# Patient Record
Sex: FEMALE | Race: WHITE | NOT HISPANIC OR LATINO | Employment: UNEMPLOYED | URBAN - METROPOLITAN AREA
[De-identification: names, ages, dates, MRNs, and addresses within clinical notes are randomized per-mention and may not be internally consistent; named-entity substitution may affect disease eponyms.]

---

## 2017-01-06 ENCOUNTER — LAB CONVERSION - ENCOUNTER (OUTPATIENT)
Dept: OTHER | Facility: OTHER | Age: 37
End: 2017-01-06

## 2017-01-06 LAB
BACTERIA UR QL AUTO: ABNORMAL /HPF
BILIRUB UR QL STRIP: NEGATIVE
COLOR UR: YELLOW
COMMENT (HISTORICAL): CLEAR
CULTURE RESULT (HISTORICAL): NORMAL
CYTOMEGALOVIRUS IGG,AB (HISTORICAL): 1.95
CYTOMEGALOVIRUS IGM,AB (HISTORICAL): <0.2
FECAL OCCULT BLOOD DIAGNOSTIC (HISTORICAL): NEGATIVE
GLUCOSE (HISTORICAL): NEGATIVE
HEPATITIS C ANTIBODY (HISTORICAL): NORMAL
HYALINE CASTS #/AREA URNS LPF: ABNORMAL /LPF
KETONES UR STRIP-MCNC: NEGATIVE MG/DL
LEUKOCYTE ESTERASE UR QL STRIP: ABNORMAL
NITRITE UR QL STRIP: NEGATIVE
PH UR STRIP.AUTO: 5.5 [PH] (ref 5–8)
PROT UR STRIP-MCNC: NEGATIVE MG/DL
RBC (HISTORICAL): ABNORMAL /HPF
RPR SCREEN (HISTORICAL): NORMAL
RUBELLA, IGG (HISTORICAL): 3.4
SIGNAL TO CUT-OFF (HISTORICAL): 0.01
SP GR UR STRIP.AUTO: 1.03 (ref 1–1.03)
SQUAMOUS EPITHELIAL CELLS (HISTORICAL): ABNORMAL /HPF
WBC # BLD AUTO: ABNORMAL /HPF

## 2017-01-09 ENCOUNTER — ALLSCRIPTS OFFICE VISIT (OUTPATIENT)
Dept: OTHER | Facility: OTHER | Age: 37
End: 2017-01-09

## 2017-01-11 LAB — CULTURE RESULT (HISTORICAL): NO GROWTH

## 2017-01-13 ENCOUNTER — LAB CONVERSION - ENCOUNTER (OUTPATIENT)
Dept: OTHER | Facility: OTHER | Age: 37
End: 2017-01-13

## 2017-01-13 ENCOUNTER — GENERIC CONVERSION - ENCOUNTER (OUTPATIENT)
Dept: OTHER | Facility: OTHER | Age: 37
End: 2017-01-13

## 2017-01-13 ENCOUNTER — ALLSCRIPTS OFFICE VISIT (OUTPATIENT)
Dept: PERINATAL CARE | Facility: CLINIC | Age: 37
End: 2017-01-13
Payer: COMMERCIAL

## 2017-01-13 LAB
BACTERIA UR QL AUTO: ABNORMAL /HPF
BILIRUB UR QL STRIP: NEGATIVE
CF COMMENT (HISTORICAL): NORMAL
COLOR UR: YELLOW
COMMENT (HISTORICAL): CLEAR
CULTURE RESULT (HISTORICAL): NORMAL
CYTOMEGALOVIRUS IGG,AB (HISTORICAL): 1.95
CYTOMEGALOVIRUS IGM,AB (HISTORICAL): <0.2
FECAL OCCULT BLOOD DIAGNOSTIC (HISTORICAL): NEGATIVE
GLUCOSE (HISTORICAL): NEGATIVE
HEPATITIS C ANTIBODY (HISTORICAL): NORMAL
HYALINE CASTS #/AREA URNS LPF: ABNORMAL /LPF
INTERPRETATION (HISTORICAL): NORMAL
KETONES UR STRIP-MCNC: NEGATIVE MG/DL
LEUKOCYTE ESTERASE UR QL STRIP: ABNORMAL
METHOD (HISTORICAL): NORMAL
MUTATIONS/POLYMORPHISMS (HISTORICAL): NORMAL
NITRITE UR QL STRIP: NEGATIVE
PH UR STRIP.AUTO: 5.5 [PH] (ref 5–8)
PROT UR STRIP-MCNC: NEGATIVE MG/DL
RACE/ETHNIC ORIGIN (HISTORICAL): NORMAL
RBC (HISTORICAL): ABNORMAL /HPF
REVIEWED BY (HISTORICAL): NORMAL
RPR SCREEN (HISTORICAL): NORMAL
RUBELLA, IGG (HISTORICAL): 3.4
SIGNAL TO CUT-OFF (HISTORICAL): 0.01
SP GR UR STRIP.AUTO: 1.03 (ref 1–1.03)
SQUAMOUS EPITHELIAL CELLS (HISTORICAL): ABNORMAL /HPF
WBC # BLD AUTO: ABNORMAL /HPF

## 2017-01-13 PROCEDURE — 76802 OB US < 14 WKS ADDL FETUS: CPT | Performed by: OBSTETRICS & GYNECOLOGY

## 2017-01-13 PROCEDURE — 76814 OB US NUCHAL MEAS ADD-ON: CPT | Performed by: OBSTETRICS & GYNECOLOGY

## 2017-01-13 PROCEDURE — 76813 OB US NUCHAL MEAS 1 GEST: CPT | Performed by: OBSTETRICS & GYNECOLOGY

## 2017-01-13 PROCEDURE — 76801 OB US < 14 WKS SINGLE FETUS: CPT | Performed by: OBSTETRICS & GYNECOLOGY

## 2017-01-16 ENCOUNTER — LAB CONVERSION - ENCOUNTER (OUTPATIENT)
Dept: OTHER | Facility: OTHER | Age: 37
End: 2017-01-16

## 2017-01-16 LAB
BACTERIA UR QL AUTO: ABNORMAL
BILIRUB UR QL STRIP: NEGATIVE
CHARACTER, URINE (HISTORICAL): ABNORMAL
COLOR UR: YELLOW
CRYSTAL TYPE (HISTORICAL): ABNORMAL PERHPF
CRYSTALS URNS QL MICRO: ABNORMAL
FINE GRAN CASTS URNS QL MICRO: ABNORMAL PERLPF (ref 0–1)
GLUCOSE UR STRIP-MCNC: NEGATIVE MG/DL
HGB UR QL STRIP.AUTO: NEGATIVE
HYALINE CASTS #/AREA URNS LPF: ABNORMAL PERLPF (ref 0–4)
KETONES UR STRIP-MCNC: NEGATIVE MG/DL
LEUKOCYTE ESTERASE UR QL STRIP: ABNORMAL
NITRITE UR QL STRIP: NEGATIVE
NON-SQ EPI CELLS URNS QL MICRO: ABNORMAL
PH UR STRIP.AUTO: 6.5 [PH] (ref 5–8)
PROT UR-MCNC: NEGATIVE G/DL
RBC CASTS URNS QL MICRO: ABNORMAL PERLPF (ref 0–1)
SP GR UR STRIP.AUTO: 1.02 (ref 1–1.03)
URINE RBC (HISTORICAL): ABNORMAL PERHPF
UROBILINOGEN UR QL STRIP.AUTO: 0.2 MG/DL (ref 0.2–1)
WBC #/AREA URNS AUTO: ABNORMAL PERHPF (ref 0–4)

## 2017-02-07 ENCOUNTER — GENERIC CONVERSION - ENCOUNTER (OUTPATIENT)
Dept: OTHER | Facility: OTHER | Age: 37
End: 2017-02-07

## 2017-02-13 ENCOUNTER — TRANSCRIBE ORDERS (OUTPATIENT)
Dept: ADMINISTRATIVE | Age: 37
End: 2017-02-13

## 2017-02-13 ENCOUNTER — APPOINTMENT (OUTPATIENT)
Dept: LAB | Age: 37
End: 2017-02-13
Payer: COMMERCIAL

## 2017-02-13 DIAGNOSIS — Z3A.17 17 WEEKS GESTATION OF PREGNANCY: ICD-10-CM

## 2017-02-13 DIAGNOSIS — Z3A.17 17 WEEKS GESTATION OF PREGNANCY: Primary | ICD-10-CM

## 2017-02-13 PROCEDURE — 36415 COLL VENOUS BLD VENIPUNCTURE: CPT

## 2017-02-14 LAB — SCAN RESULT: NORMAL

## 2017-02-15 ENCOUNTER — GENERIC CONVERSION - ENCOUNTER (OUTPATIENT)
Dept: OTHER | Facility: OTHER | Age: 37
End: 2017-02-15

## 2017-03-06 ENCOUNTER — GENERIC CONVERSION - ENCOUNTER (OUTPATIENT)
Dept: OTHER | Facility: OTHER | Age: 37
End: 2017-03-06

## 2017-03-10 ENCOUNTER — ALLSCRIPTS OFFICE VISIT (OUTPATIENT)
Dept: PERINATAL CARE | Facility: CLINIC | Age: 37
End: 2017-03-10
Payer: COMMERCIAL

## 2017-03-10 ENCOUNTER — GENERIC CONVERSION - ENCOUNTER (OUTPATIENT)
Dept: OTHER | Facility: OTHER | Age: 37
End: 2017-03-10

## 2017-03-10 PROCEDURE — 76812 OB US DETAILED ADDL FETUS: CPT | Performed by: OBSTETRICS & GYNECOLOGY

## 2017-03-10 PROCEDURE — 76817 TRANSVAGINAL US OBSTETRIC: CPT | Performed by: OBSTETRICS & GYNECOLOGY

## 2017-03-10 PROCEDURE — 76811 OB US DETAILED SNGL FETUS: CPT | Performed by: OBSTETRICS & GYNECOLOGY

## 2017-04-05 ENCOUNTER — ALLSCRIPTS OFFICE VISIT (OUTPATIENT)
Dept: OTHER | Facility: OTHER | Age: 37
End: 2017-04-05

## 2017-04-05 ENCOUNTER — GENERIC CONVERSION - ENCOUNTER (OUTPATIENT)
Dept: OTHER | Facility: OTHER | Age: 37
End: 2017-04-05

## 2017-04-05 DIAGNOSIS — O30.042 DICHORIONIC DIAMNIOTIC TWIN PREGNANCY IN SECOND TRIMESTER: ICD-10-CM

## 2017-04-07 ENCOUNTER — ALLSCRIPTS OFFICE VISIT (OUTPATIENT)
Dept: PERINATAL CARE | Facility: CLINIC | Age: 37
End: 2017-04-07
Payer: COMMERCIAL

## 2017-04-07 ENCOUNTER — GENERIC CONVERSION - ENCOUNTER (OUTPATIENT)
Dept: OTHER | Facility: OTHER | Age: 37
End: 2017-04-07

## 2017-04-07 PROCEDURE — 76816 OB US FOLLOW-UP PER FETUS: CPT | Performed by: OBSTETRICS & GYNECOLOGY

## 2017-04-22 ENCOUNTER — LAB CONVERSION - ENCOUNTER (OUTPATIENT)
Dept: OTHER | Facility: OTHER | Age: 37
End: 2017-04-22

## 2017-04-22 LAB
BASOPHILS # BLD AUTO: 0.3 %
BASOPHILS # BLD AUTO: 27 CELLS/UL (ref 0–200)
DEPRECATED RDW RBC AUTO: 13.4 % (ref 11–15)
EOSINOPHIL # BLD AUTO: 0.4 %
EOSINOPHIL # BLD AUTO: 36 CELLS/UL (ref 15–500)
GLUCOSE 1 HR 50 GM GLUC CHALLENGE-PREG PTS (HISTORICAL): 125 MG/DL
HCT VFR BLD AUTO: 32.3 % (ref 35–45)
HGB BLD-MCNC: 10.8 G/DL (ref 11.7–15.5)
LYMPHOCYTES # BLD AUTO: 1629 CELLS/UL (ref 850–3900)
LYMPHOCYTES # BLD AUTO: 18.3 %
MCH RBC QN AUTO: 32.5 PG (ref 27–33)
MCHC RBC AUTO-ENTMCNC: 33.4 G/DL (ref 32–36)
MCV RBC AUTO: 97.4 FL (ref 80–100)
MONOCYTES # BLD AUTO: 427 CELLS/UL (ref 200–950)
MONOCYTES (HISTORICAL): 4.8 %
NEUTROPHILS # BLD AUTO: 6782 CELLS/UL (ref 1500–7800)
NEUTROPHILS # BLD AUTO: 76.2 %
PLATELET # BLD AUTO: 185 THOUSAND/UL (ref 140–400)
PMV BLD AUTO: 8.1 FL (ref 7.5–12.5)
RBC # BLD AUTO: 3.32 MILLION/UL (ref 3.8–5.1)
WBC # BLD AUTO: 8.9 THOUSAND/UL (ref 3.8–10.8)

## 2017-05-05 ENCOUNTER — GENERIC CONVERSION - ENCOUNTER (OUTPATIENT)
Dept: OTHER | Facility: OTHER | Age: 37
End: 2017-05-05

## 2017-05-05 ENCOUNTER — ALLSCRIPTS OFFICE VISIT (OUTPATIENT)
Dept: PERINATAL CARE | Facility: CLINIC | Age: 37
End: 2017-05-05
Payer: COMMERCIAL

## 2017-05-05 PROCEDURE — 76816 OB US FOLLOW-UP PER FETUS: CPT | Performed by: OBSTETRICS & GYNECOLOGY

## 2017-05-09 ENCOUNTER — ALLSCRIPTS OFFICE VISIT (OUTPATIENT)
Dept: OTHER | Facility: OTHER | Age: 37
End: 2017-05-09

## 2017-05-09 DIAGNOSIS — R10.2 PELVIC AND PERINEAL PAIN: ICD-10-CM

## 2017-05-18 ENCOUNTER — APPOINTMENT (OUTPATIENT)
Dept: PHYSICAL THERAPY | Facility: REHABILITATION | Age: 37
End: 2017-05-18
Payer: COMMERCIAL

## 2017-05-18 DIAGNOSIS — R10.2 PELVIC AND PERINEAL PAIN: ICD-10-CM

## 2017-05-18 PROCEDURE — 97110 THERAPEUTIC EXERCISES: CPT

## 2017-05-18 PROCEDURE — 97162 PT EVAL MOD COMPLEX 30 MIN: CPT

## 2017-05-24 ENCOUNTER — GENERIC CONVERSION - ENCOUNTER (OUTPATIENT)
Dept: OTHER | Facility: OTHER | Age: 37
End: 2017-05-24

## 2017-05-26 ENCOUNTER — GENERIC CONVERSION - ENCOUNTER (OUTPATIENT)
Dept: OTHER | Facility: OTHER | Age: 37
End: 2017-05-26

## 2017-05-31 ENCOUNTER — APPOINTMENT (OUTPATIENT)
Dept: PHYSICAL THERAPY | Facility: REHABILITATION | Age: 37
End: 2017-05-31
Payer: COMMERCIAL

## 2017-05-31 ENCOUNTER — ALLSCRIPTS OFFICE VISIT (OUTPATIENT)
Dept: PERINATAL CARE | Facility: CLINIC | Age: 37
End: 2017-05-31
Payer: COMMERCIAL

## 2017-05-31 ENCOUNTER — GENERIC CONVERSION - ENCOUNTER (OUTPATIENT)
Dept: OTHER | Facility: OTHER | Age: 37
End: 2017-05-31

## 2017-05-31 PROCEDURE — 59897 UNLISTED FETAL INVAS PX W/US: CPT | Performed by: OBSTETRICS & GYNECOLOGY

## 2017-05-31 PROCEDURE — 76816 OB US FOLLOW-UP PER FETUS: CPT | Performed by: OBSTETRICS & GYNECOLOGY

## 2017-06-01 ENCOUNTER — GENERIC CONVERSION - ENCOUNTER (OUTPATIENT)
Dept: OTHER | Facility: OTHER | Age: 37
End: 2017-06-01

## 2017-06-06 ENCOUNTER — APPOINTMENT (OUTPATIENT)
Dept: PHYSICAL THERAPY | Facility: REHABILITATION | Age: 37
End: 2017-06-06
Payer: COMMERCIAL

## 2017-06-06 PROCEDURE — 97140 MANUAL THERAPY 1/> REGIONS: CPT

## 2017-06-06 PROCEDURE — 97110 THERAPEUTIC EXERCISES: CPT

## 2017-06-07 ENCOUNTER — GENERIC CONVERSION - ENCOUNTER (OUTPATIENT)
Dept: OBGYN CLINIC | Facility: CLINIC | Age: 37
End: 2017-06-07

## 2017-06-08 ENCOUNTER — ALLSCRIPTS OFFICE VISIT (OUTPATIENT)
Dept: PERINATAL CARE | Facility: CLINIC | Age: 37
End: 2017-06-08

## 2017-06-08 ENCOUNTER — GENERIC CONVERSION - ENCOUNTER (OUTPATIENT)
Dept: OTHER | Facility: OTHER | Age: 37
End: 2017-06-08

## 2017-06-08 ENCOUNTER — ALLSCRIPTS OFFICE VISIT (OUTPATIENT)
Dept: PERINATAL CARE | Facility: CLINIC | Age: 37
End: 2017-06-08
Payer: COMMERCIAL

## 2017-06-08 PROCEDURE — 59025 FETAL NON-STRESS TEST: CPT | Performed by: OBSTETRICS & GYNECOLOGY

## 2017-06-08 PROCEDURE — 76815 OB US LIMITED FETUS(S): CPT | Performed by: OBSTETRICS & GYNECOLOGY

## 2017-06-10 ENCOUNTER — LAB CONVERSION - ENCOUNTER (OUTPATIENT)
Dept: OTHER | Facility: OTHER | Age: 37
End: 2017-06-10

## 2017-06-10 LAB — CULT RSLT GENITAL (HISTORICAL): NORMAL

## 2017-06-13 ENCOUNTER — APPOINTMENT (OUTPATIENT)
Dept: PHYSICAL THERAPY | Facility: REHABILITATION | Age: 37
End: 2017-06-13
Payer: COMMERCIAL

## 2017-06-13 PROCEDURE — 97140 MANUAL THERAPY 1/> REGIONS: CPT

## 2017-06-13 PROCEDURE — 97110 THERAPEUTIC EXERCISES: CPT

## 2017-06-15 ENCOUNTER — ALLSCRIPTS OFFICE VISIT (OUTPATIENT)
Dept: PERINATAL CARE | Facility: CLINIC | Age: 37
End: 2017-06-15
Payer: COMMERCIAL

## 2017-06-15 ENCOUNTER — GENERIC CONVERSION - ENCOUNTER (OUTPATIENT)
Dept: OTHER | Facility: OTHER | Age: 37
End: 2017-06-15

## 2017-06-15 PROCEDURE — 76818 FETAL BIOPHYS PROFILE W/NST: CPT | Performed by: OBSTETRICS & GYNECOLOGY

## 2017-06-16 ENCOUNTER — ALLSCRIPTS OFFICE VISIT (OUTPATIENT)
Dept: OTHER | Facility: OTHER | Age: 37
End: 2017-06-16

## 2017-06-16 LAB — EXTERNAL GROUP B STREP ANTIGEN: NEGATIVE

## 2017-06-18 LAB — STREP GRP B, MOLECULAR (HISTORICAL): NEGATIVE

## 2017-06-19 ENCOUNTER — APPOINTMENT (OUTPATIENT)
Dept: PHYSICAL THERAPY | Facility: REHABILITATION | Age: 37
End: 2017-06-19
Payer: COMMERCIAL

## 2017-06-22 ENCOUNTER — GENERIC CONVERSION - ENCOUNTER (OUTPATIENT)
Dept: OTHER | Facility: OTHER | Age: 37
End: 2017-06-22

## 2017-06-22 ENCOUNTER — ALLSCRIPTS OFFICE VISIT (OUTPATIENT)
Dept: PERINATAL CARE | Facility: CLINIC | Age: 37
End: 2017-06-22
Payer: COMMERCIAL

## 2017-06-22 PROCEDURE — 76815 OB US LIMITED FETUS(S): CPT | Performed by: OBSTETRICS & GYNECOLOGY

## 2017-06-22 PROCEDURE — 59025 FETAL NON-STRESS TEST: CPT | Performed by: OBSTETRICS & GYNECOLOGY

## 2017-06-23 ENCOUNTER — GENERIC CONVERSION - ENCOUNTER (OUTPATIENT)
Dept: OBGYN CLINIC | Facility: CLINIC | Age: 37
End: 2017-06-23

## 2017-06-26 ENCOUNTER — HOSPITAL ENCOUNTER (INPATIENT)
Facility: HOSPITAL | Age: 37
LOS: 2 days | Discharge: HOME | End: 2017-06-28
Attending: OBSTETRICS & GYNECOLOGY | Admitting: OBSTETRICS & GYNECOLOGY
Payer: COMMERCIAL

## 2017-06-26 ENCOUNTER — ANESTHESIA (INPATIENT)
Dept: LABOR AND DELIVERY | Facility: HOSPITAL | Age: 37
End: 2017-06-26
Payer: COMMERCIAL

## 2017-06-26 ENCOUNTER — ANESTHESIA EVENT (INPATIENT)
Dept: LABOR AND DELIVERY | Facility: HOSPITAL | Age: 37
End: 2017-06-26
Payer: COMMERCIAL

## 2017-06-26 ENCOUNTER — HOSPITAL ENCOUNTER (OUTPATIENT)
Dept: LABOR AND DELIVERY | Facility: HOSPITAL | Age: 37
Discharge: HOME | End: 2017-06-26
Payer: COMMERCIAL

## 2017-06-26 PROBLEM — Z3A.36 36 WEEKS GESTATION OF PREGNANCY: Status: ACTIVE | Noted: 2017-06-26

## 2017-06-26 LAB
ABO GROUP BLD: NORMAL
BASE EXCESS BLDCOA CALC-SCNC: -3.2 MMOL/L (ref 3–11)
BASE EXCESS BLDCOA CALC-SCNC: -4.2 MMOL/L (ref 3–11)
BASE EXCESS BLDCOV CALC-SCNC: -0.9 MMOL/L (ref 1–9)
BASE EXCESS BLDCOV CALC-SCNC: -4.4 MMOL/L (ref 1–9)
BASOPHILS # BLD AUTO: 0.01 THOUSANDS/ΜL (ref 0–0.1)
BASOPHILS NFR BLD AUTO: 0 % (ref 0–1)
BLD GP AB SCN SERPL QL: NEGATIVE
EOSINOPHIL # BLD AUTO: 0.08 THOUSAND/ΜL (ref 0–0.61)
EOSINOPHIL NFR BLD AUTO: 1 % (ref 0–6)
ERYTHROCYTE [DISTWIDTH] IN BLOOD BY AUTOMATED COUNT: 13.2 % (ref 11.6–15.1)
HCO3 BLDCOA-SCNC: 23.8 MMOL/L (ref 17.3–27.3)
HCO3 BLDCOA-SCNC: 24.9 MMOL/L (ref 17.3–27.3)
HCO3 BLDCOV-SCNC: 21.8 MMOL/L (ref 12.2–28.6)
HCO3 BLDCOV-SCNC: 24.6 MMOL/L (ref 12.2–28.6)
HCT VFR BLD AUTO: 31.8 % (ref 34.8–46.1)
HGB BLD-MCNC: 11 G/DL (ref 11.5–15.4)
LYMPHOCYTES # BLD AUTO: 1.61 THOUSANDS/ΜL (ref 0.6–4.47)
LYMPHOCYTES NFR BLD AUTO: 18 % (ref 14–44)
MCH RBC QN AUTO: 32.1 PG (ref 26.8–34.3)
MCHC RBC AUTO-ENTMCNC: 34.6 G/DL (ref 31.4–37.4)
MCV RBC AUTO: 93 FL (ref 82–98)
MONOCYTES # BLD AUTO: 0.61 THOUSAND/ΜL (ref 0.17–1.22)
MONOCYTES NFR BLD AUTO: 7 % (ref 4–12)
NEUTROPHILS # BLD AUTO: 6.46 THOUSANDS/ΜL (ref 1.85–7.62)
NEUTS SEG NFR BLD AUTO: 74 % (ref 43–75)
NRBC BLD AUTO-RTO: 0 /100 WBCS
O2 CT VFR BLDCOA CALC: 3.9 ML/DL
O2 CT VFR BLDCOA CALC: 4.2 ML/DL
OXYHGB MFR BLDCOA: 18.3 %
OXYHGB MFR BLDCOA: 20.5 %
OXYHGB MFR BLDCOV: 51.8 %
OXYHGB MFR BLDCOV: 54.4 %
PCO2 BLDCOA: 55.1 MM[HG] (ref 30–60)
PCO2 BLDCOA: 57.1 MM[HG] (ref 30–60)
PCO2 BLDCOV: 43.8 MM HG (ref 27–43)
PCO2 BLDCOV: 44.2 MM HG (ref 27–43)
PH BLDCOA: 7.25 [PH] (ref 7.23–7.43)
PH BLDCOA: 7.26 [PH] (ref 7.23–7.43)
PH BLDCOV: 7.31 [PH] (ref 7.19–7.49)
PH BLDCOV: 7.37 [PH] (ref 7.19–7.49)
PLATELET # BLD AUTO: 174 THOUSANDS/UL (ref 149–390)
PMV BLD AUTO: 10.5 FL (ref 8.9–12.7)
PO2 BLDCOA: 12.3 MM HG (ref 5–25)
PO2 BLDCOA: 12.8 MM HG (ref 5–25)
PO2 BLDCOV: 22.8 MM HG (ref 15–45)
PO2 BLDCOV: 23.6 MM HG (ref 15–45)
RBC # BLD AUTO: 3.43 MILLION/UL (ref 3.81–5.12)
RH BLD: POSITIVE
SAO2 % BLDCOV: 11.1 ML/DL
SAO2 % BLDCOV: 11.5 ML/DL
SPECIMEN EXPIRATION DATE: NORMAL
WBC # BLD AUTO: 8.8 THOUSAND/UL (ref 4.31–10.16)

## 2017-06-26 PROCEDURE — 86900 BLOOD TYPING SEROLOGIC ABO: CPT | Performed by: OBSTETRICS & GYNECOLOGY

## 2017-06-26 PROCEDURE — 85025 COMPLETE CBC W/AUTO DIFF WBC: CPT | Performed by: OBSTETRICS & GYNECOLOGY

## 2017-06-26 PROCEDURE — 82805 BLOOD GASES W/O2 SATURATION: CPT | Performed by: OBSTETRICS & GYNECOLOGY

## 2017-06-26 PROCEDURE — 86901 BLOOD TYPING SEROLOGIC RH(D): CPT | Performed by: OBSTETRICS & GYNECOLOGY

## 2017-06-26 PROCEDURE — 86850 RBC ANTIBODY SCREEN: CPT | Performed by: OBSTETRICS & GYNECOLOGY

## 2017-06-26 PROCEDURE — 86592 SYPHILIS TEST NON-TREP QUAL: CPT | Performed by: OBSTETRICS & GYNECOLOGY

## 2017-06-26 RX ORDER — ACETAMINOPHEN 325 MG/1
650 TABLET ORAL EVERY 6 HOURS PRN
Status: DISCONTINUED | OUTPATIENT
Start: 2017-06-26 | End: 2017-06-28 | Stop reason: HOSPADM

## 2017-06-26 RX ORDER — ONDANSETRON 2 MG/ML
INJECTION INTRAMUSCULAR; INTRAVENOUS AS NEEDED
Status: DISCONTINUED | OUTPATIENT
Start: 2017-06-26 | End: 2017-06-26 | Stop reason: SURG

## 2017-06-26 RX ORDER — OXYCODONE HYDROCHLORIDE AND ACETAMINOPHEN 5; 325 MG/1; MG/1
2 TABLET ORAL EVERY 4 HOURS PRN
Status: DISCONTINUED | OUTPATIENT
Start: 2017-06-26 | End: 2017-06-28 | Stop reason: HOSPADM

## 2017-06-26 RX ORDER — METHYLERGONOVINE MALEATE 0.2 MG/ML
INJECTION INTRAVENOUS
Status: DISPENSED
Start: 2017-06-26 | End: 2017-06-27

## 2017-06-26 RX ORDER — SENNOSIDES 8.6 MG
1 TABLET ORAL
Status: DISCONTINUED | OUTPATIENT
Start: 2017-06-26 | End: 2017-06-28 | Stop reason: HOSPADM

## 2017-06-26 RX ORDER — FENTANYL CITRATE 50 UG/ML
INJECTION, SOLUTION INTRAMUSCULAR; INTRAVENOUS AS NEEDED
Status: DISCONTINUED | OUTPATIENT
Start: 2017-06-26 | End: 2017-06-26 | Stop reason: SURG

## 2017-06-26 RX ORDER — CALCIUM CARBONATE 200(500)MG
1000 TABLET,CHEWABLE ORAL DAILY PRN
Status: DISCONTINUED | OUTPATIENT
Start: 2017-06-26 | End: 2017-06-28 | Stop reason: HOSPADM

## 2017-06-26 RX ORDER — MAGNESIUM HYDROXIDE/ALUMINUM HYDROXICE/SIMETHICONE 120; 1200; 1200 MG/30ML; MG/30ML; MG/30ML
15 SUSPENSION ORAL EVERY 6 HOURS PRN
Status: DISCONTINUED | OUTPATIENT
Start: 2017-06-26 | End: 2017-06-28 | Stop reason: HOSPADM

## 2017-06-26 RX ORDER — LANOLIN ALCOHOL/MO/W.PET/CERES
800 CREAM (GRAM) TOPICAL DAILY
Status: DISCONTINUED | OUTPATIENT
Start: 2017-06-27 | End: 2017-06-28 | Stop reason: HOSPADM

## 2017-06-26 RX ORDER — DOCUSATE SODIUM 100 MG/1
100 CAPSULE, LIQUID FILLED ORAL 2 TIMES DAILY PRN
Status: DISCONTINUED | OUTPATIENT
Start: 2017-06-26 | End: 2017-06-28 | Stop reason: HOSPADM

## 2017-06-26 RX ORDER — OXYTOCIN/RINGER'S LACTATE 30/500 ML
1-30 PLASTIC BAG, INJECTION (ML) INTRAVENOUS
Status: DISCONTINUED | OUTPATIENT
Start: 2017-06-26 | End: 2017-06-26

## 2017-06-26 RX ORDER — ONDANSETRON 2 MG/ML
4 INJECTION INTRAMUSCULAR; INTRAVENOUS EVERY 8 HOURS PRN
Status: DISCONTINUED | OUTPATIENT
Start: 2017-06-26 | End: 2017-06-28 | Stop reason: HOSPADM

## 2017-06-26 RX ORDER — DIAPER,BRIEF,INFANT-TODD,DISP
1 EACH MISCELLANEOUS AS NEEDED
Status: DISCONTINUED | OUTPATIENT
Start: 2017-06-26 | End: 2017-06-28 | Stop reason: HOSPADM

## 2017-06-26 RX ORDER — ROPIVACAINE HYDROCHLORIDE 2 MG/ML
INJECTION, SOLUTION EPIDURAL; INFILTRATION; PERINEURAL AS NEEDED
Status: DISCONTINUED | OUTPATIENT
Start: 2017-06-26 | End: 2017-06-26 | Stop reason: SURG

## 2017-06-26 RX ORDER — SIMETHICONE 80 MG
80 TABLET,CHEWABLE ORAL 4 TIMES DAILY PRN
Status: DISCONTINUED | OUTPATIENT
Start: 2017-06-26 | End: 2017-06-28 | Stop reason: HOSPADM

## 2017-06-26 RX ORDER — MISOPROSTOL 100 UG/1
800 TABLET ORAL ONCE
Status: COMPLETED | OUTPATIENT
Start: 2017-06-27 | End: 2017-06-26

## 2017-06-26 RX ORDER — LIDOCAINE HYDROCHLORIDE AND EPINEPHRINE 15; 5 MG/ML; UG/ML
INJECTION, SOLUTION EPIDURAL AS NEEDED
Status: DISCONTINUED | OUTPATIENT
Start: 2017-06-26 | End: 2017-06-26 | Stop reason: SURG

## 2017-06-26 RX ORDER — MELATONIN
1000 DAILY
Status: DISCONTINUED | OUTPATIENT
Start: 2017-06-27 | End: 2017-06-28 | Stop reason: HOSPADM

## 2017-06-26 RX ORDER — ONDANSETRON 2 MG/ML
4 INJECTION INTRAMUSCULAR; INTRAVENOUS EVERY 6 HOURS PRN
Status: DISCONTINUED | OUTPATIENT
Start: 2017-06-26 | End: 2017-06-26

## 2017-06-26 RX ORDER — DIPHENHYDRAMINE HCL 25 MG
25 TABLET ORAL EVERY 6 HOURS PRN
Status: DISCONTINUED | OUTPATIENT
Start: 2017-06-26 | End: 2017-06-28 | Stop reason: HOSPADM

## 2017-06-26 RX ORDER — IBUPROFEN 600 MG/1
600 TABLET ORAL EVERY 6 HOURS PRN
Status: DISCONTINUED | OUTPATIENT
Start: 2017-06-26 | End: 2017-06-28 | Stop reason: HOSPADM

## 2017-06-26 RX ORDER — FAMOTIDINE 20 MG/1
20 TABLET, FILM COATED ORAL DAILY
Status: DISCONTINUED | OUTPATIENT
Start: 2017-06-27 | End: 2017-06-28 | Stop reason: HOSPADM

## 2017-06-26 RX ORDER — OXYCODONE HYDROCHLORIDE AND ACETAMINOPHEN 5; 325 MG/1; MG/1
1 TABLET ORAL EVERY 4 HOURS PRN
Status: DISCONTINUED | OUTPATIENT
Start: 2017-06-26 | End: 2017-06-28 | Stop reason: HOSPADM

## 2017-06-26 RX ORDER — SODIUM CHLORIDE, SODIUM LACTATE, POTASSIUM CHLORIDE, CALCIUM CHLORIDE 600; 310; 30; 20 MG/100ML; MG/100ML; MG/100ML; MG/100ML
125 INJECTION, SOLUTION INTRAVENOUS CONTINUOUS
Status: DISCONTINUED | OUTPATIENT
Start: 2017-06-26 | End: 2017-06-28 | Stop reason: HOSPADM

## 2017-06-26 RX ORDER — OXYTOCIN/RINGER'S LACTATE 30/500 ML
250 PLASTIC BAG, INJECTION (ML) INTRAVENOUS CONTINUOUS
Status: DISCONTINUED | OUTPATIENT
Start: 2017-06-27 | End: 2017-06-28 | Stop reason: HOSPADM

## 2017-06-26 RX ORDER — CARBOPROST TROMETHAMINE 250 UG/ML
INJECTION, SOLUTION INTRAMUSCULAR
Status: DISPENSED
Start: 2017-06-26 | End: 2017-06-27

## 2017-06-26 RX ORDER — BISACODYL 10 MG
10 SUPPOSITORY, RECTAL RECTAL AS NEEDED
Status: DISCONTINUED | OUTPATIENT
Start: 2017-06-26 | End: 2017-06-28 | Stop reason: HOSPADM

## 2017-06-26 RX ADMIN — ROPIVACAINE HYDROCHLORIDE: 2 INJECTION, SOLUTION EPIDURAL; INFILTRATION at 20:45

## 2017-06-26 RX ADMIN — SODIUM CHLORIDE, SODIUM LACTATE, POTASSIUM CHLORIDE, AND CALCIUM CHLORIDE 125 ML/HR: .6; .31; .03; .02 INJECTION, SOLUTION INTRAVENOUS at 21:03

## 2017-06-26 RX ADMIN — SODIUM CHLORIDE, SODIUM LACTATE, POTASSIUM CHLORIDE, AND CALCIUM CHLORIDE 125 ML/HR: .6; .31; .03; .02 INJECTION, SOLUTION INTRAVENOUS at 09:00

## 2017-06-26 RX ADMIN — OXYTOCIN 19 MILLI-UNITS: 10 INJECTION, SOLUTION INTRAMUSCULAR; INTRAVENOUS at 21:52

## 2017-06-26 RX ADMIN — SODIUM CHLORIDE, SODIUM LACTATE, POTASSIUM CHLORIDE, AND CALCIUM CHLORIDE: .6; .31; .03; .02 INJECTION, SOLUTION INTRAVENOUS at 21:52

## 2017-06-26 RX ADMIN — ONDANSETRON 4 MG: 2 INJECTION INTRAMUSCULAR; INTRAVENOUS at 22:36

## 2017-06-26 RX ADMIN — FENTANYL CITRATE 25 MCG: 50 INJECTION, SOLUTION INTRAMUSCULAR; INTRAVENOUS at 22:17

## 2017-06-26 RX ADMIN — SODIUM CHLORIDE, SODIUM LACTATE, POTASSIUM CHLORIDE, AND CALCIUM CHLORIDE: .6; .31; .03; .02 INJECTION, SOLUTION INTRAVENOUS at 22:35

## 2017-06-26 RX ADMIN — SODIUM CHLORIDE, SODIUM LACTATE, POTASSIUM CHLORIDE, AND CALCIUM CHLORIDE 125 ML/HR: .6; .31; .03; .02 INJECTION, SOLUTION INTRAVENOUS at 13:40

## 2017-06-26 RX ADMIN — OXYTOCIN 250 MILLI-UNITS: 10 INJECTION, SOLUTION INTRAMUSCULAR; INTRAVENOUS at 22:22

## 2017-06-26 RX ADMIN — LIDOCAINE HYDROCHLORIDE 5 ML: 20 INJECTION, SOLUTION INTRAVENOUS at 21:57

## 2017-06-26 RX ADMIN — FENTANYL CITRATE 75 MCG: 50 INJECTION, SOLUTION INTRAMUSCULAR; INTRAVENOUS at 22:36

## 2017-06-26 RX ADMIN — LIDOCAINE HYDROCHLORIDE AND EPINEPHRINE 3 ML: 15; 5 INJECTION, SOLUTION EPIDURAL at 13:09

## 2017-06-26 RX ADMIN — SODIUM CHLORIDE, SODIUM LACTATE, POTASSIUM CHLORIDE, AND CALCIUM CHLORIDE 125 ML/HR: .6; .31; .03; .02 INJECTION, SOLUTION INTRAVENOUS at 12:30

## 2017-06-26 RX ADMIN — MISOPROSTOL 800 MCG: 200 TABLET ORAL at 23:47

## 2017-06-26 RX ADMIN — ROPIVACAINE HYDROCHLORIDE: 2 INJECTION, SOLUTION EPIDURAL; INFILTRATION at 13:15

## 2017-06-26 RX ADMIN — OXYTOCIN: 10 INJECTION, SOLUTION INTRAMUSCULAR; INTRAVENOUS at 21:52

## 2017-06-26 RX ADMIN — LIDOCAINE HYDROCHLORIDE 5 ML: 20 INJECTION, SOLUTION INTRAVENOUS at 21:52

## 2017-06-26 RX ADMIN — Medication 1 MILLI-UNITS/MIN: at 09:20

## 2017-06-26 RX ADMIN — Medication 250 MILLI-UNITS/MIN: at 23:35

## 2017-06-26 RX ADMIN — ROPIVACAINE HYDROCHLORIDE 100 ML: 2 INJECTION, SOLUTION EPIDURAL; INFILTRATION at 13:19

## 2017-06-26 RX ADMIN — ROPIVACAINE HYDROCHLORIDE 10 ML: 2 INJECTION, SOLUTION EPIDURAL; INFILTRATION at 13:15

## 2017-06-27 LAB — RPR SER QL: NORMAL

## 2017-06-27 PROCEDURE — 88307 TISSUE EXAM BY PATHOLOGIST: CPT | Performed by: OBSTETRICS & GYNECOLOGY

## 2017-06-27 PROCEDURE — 10907ZC DRAINAGE OF AMNIOTIC FLUID, THERAPEUTIC FROM PRODUCTS OF CONCEPTION, VIA NATURAL OR ARTIFICIAL OPENING: ICD-10-PCS | Performed by: OBSTETRICS & GYNECOLOGY

## 2017-06-27 RX ADMIN — Medication 800 MCG: at 08:27

## 2017-06-27 RX ADMIN — OXYCODONE HYDROCHLORIDE AND ACETAMINOPHEN 1 TABLET: 5; 325 TABLET ORAL at 08:28

## 2017-06-27 RX ADMIN — FAMOTIDINE 20 MG: 20 TABLET ORAL at 08:27

## 2017-06-27 RX ADMIN — VITAMIN D, TAB 1000IU (100/BT) 1000 UNITS: 25 TAB at 08:27

## 2017-06-27 RX ADMIN — HYDROCORTISONE 1 APPLICATION: 1 CREAM TOPICAL at 08:29

## 2017-06-27 RX ADMIN — WITCH HAZEL 1 PAD: 500 SOLUTION RECTAL; TOPICAL at 00:23

## 2017-06-27 RX ADMIN — OXYCODONE HYDROCHLORIDE AND ACETAMINOPHEN 1 TABLET: 5; 325 TABLET ORAL at 14:56

## 2017-06-27 RX ADMIN — OXYCODONE HYDROCHLORIDE AND ACETAMINOPHEN 1 TABLET: 5; 325 TABLET ORAL at 19:45

## 2017-06-27 RX ADMIN — DOCUSATE SODIUM 100 MG: 100 CAPSULE, LIQUID FILLED ORAL at 21:35

## 2017-06-27 RX ADMIN — IBUPROFEN 600 MG: 600 TABLET, FILM COATED ORAL at 01:56

## 2017-06-27 RX ADMIN — BENZOCAINE AND MENTHOL: 20; .5 SPRAY TOPICAL at 00:23

## 2017-06-27 RX ADMIN — Medication 1 TABLET: at 08:30

## 2017-06-27 RX ADMIN — OXYCODONE HYDROCHLORIDE AND ACETAMINOPHEN 1 TABLET: 5; 325 TABLET ORAL at 00:22

## 2017-06-27 RX ADMIN — DOCUSATE SODIUM 100 MG: 100 CAPSULE, LIQUID FILLED ORAL at 08:28

## 2017-06-28 VITALS
BODY MASS INDEX: 33.65 KG/M2 | RESPIRATION RATE: 20 BRPM | SYSTOLIC BLOOD PRESSURE: 110 MMHG | OXYGEN SATURATION: 99 % | DIASTOLIC BLOOD PRESSURE: 54 MMHG | TEMPERATURE: 97.7 F | WEIGHT: 222 LBS | HEIGHT: 68 IN | HEART RATE: 76 BPM

## 2017-06-28 PROCEDURE — 3E033VJ INTRODUCTION OF OTHER HORMONE INTO PERIPHERAL VEIN, PERCUTANEOUS APPROACH: ICD-10-PCS | Performed by: OBSTETRICS & GYNECOLOGY

## 2017-06-28 RX ORDER — IBUPROFEN 600 MG/1
600 TABLET ORAL EVERY 6 HOURS PRN
Qty: 30 TABLET | Refills: 0
Start: 2017-06-28 | End: 2018-06-26 | Stop reason: ALTCHOICE

## 2017-06-28 RX ORDER — DIAPER,BRIEF,INFANT-TODD,DISP
1 EACH MISCELLANEOUS AS NEEDED
Qty: 30 G | Refills: 0
Start: 2017-06-28 | End: 2018-06-26 | Stop reason: ALTCHOICE

## 2017-06-28 RX ORDER — DOCUSATE SODIUM 100 MG/1
100 CAPSULE, LIQUID FILLED ORAL 2 TIMES DAILY PRN
Qty: 10 CAPSULE | Refills: 0
Start: 2017-06-28 | End: 2018-06-26 | Stop reason: ALTCHOICE

## 2017-06-28 RX ADMIN — OXYCODONE HYDROCHLORIDE AND ACETAMINOPHEN 1 TABLET: 5; 325 TABLET ORAL at 09:48

## 2017-06-28 RX ADMIN — OXYCODONE HYDROCHLORIDE AND ACETAMINOPHEN 1 TABLET: 5; 325 TABLET ORAL at 00:08

## 2017-06-28 RX ADMIN — VITAMIN D, TAB 1000IU (100/BT) 1000 UNITS: 25 TAB at 09:42

## 2017-06-28 RX ADMIN — Medication 1 TABLET: at 09:42

## 2017-06-28 RX ADMIN — IBUPROFEN 600 MG: 600 TABLET, FILM COATED ORAL at 06:09

## 2017-06-28 RX ADMIN — FAMOTIDINE 20 MG: 20 TABLET ORAL at 09:42

## 2017-06-28 RX ADMIN — Medication 800 MCG: at 09:41

## 2017-07-11 ENCOUNTER — TELEPHONE (OUTPATIENT)
Dept: LABOR AND DELIVERY | Facility: HOSPITAL | Age: 37
End: 2017-07-11

## 2017-07-15 ENCOUNTER — TELEPHONE (OUTPATIENT)
Dept: LABOR AND DELIVERY | Facility: HOSPITAL | Age: 37
End: 2017-07-15

## 2017-07-28 ENCOUNTER — GENERIC CONVERSION - ENCOUNTER (OUTPATIENT)
Dept: OTHER | Facility: OTHER | Age: 37
End: 2017-07-28

## 2017-12-13 ENCOUNTER — ALLSCRIPTS OFFICE VISIT (OUTPATIENT)
Dept: OTHER | Facility: OTHER | Age: 37
End: 2017-12-13

## 2017-12-13 DIAGNOSIS — N63.20 MASS OF LEFT BREAST: ICD-10-CM

## 2017-12-15 NOTE — PROGRESS NOTES
Assessment  1  Encounter for gynecological examination with abnormal finding (V72 31) (Z01 411)   2  Left breast lump (611 72) (N63 20)    Plan   Encounter for gynecological examination with abnormal finding    · (B) PAP WITH HPV PLUS; Status: In Progress - Specimen/Data Collected;   Done:84Tdl9255   Perform:[facility]; Due:13Bwn3331; Ordered;For:Encounter for gynecological examination with abnormal finding; Ordered By:Georgette Hampton;  : 12/3/17   · Limit your use of alcohol to 2 drinks or cans of beer a day ; Status:Complete;   Done:44Vfr4458   Ordered;For:Encounter for gynecological examination with abnormal finding; Ordered By:Georgette Hampton;   · Vitamins can help you get daily requirements that your diet may not be giving you  ;Status:Complete;   Done: 72DBT0150   Ordered;For:Encounter for gynecological examination with abnormal finding; Ordered By:Georgette Hampton;   · We encourage all of our patients to exercise regularly  30 minutes of exercise or physicalactivity five or more days a week is recommended for children and adults  ;Status:Complete;   Done: 51LVW5366   Ordered;For:Encounter for gynecological examination with abnormal finding; Ordered By:Chavez Hampton;  Left breast lump    · *US BREAST LEFT LIMITED (DIAGNOSTIC); Status:Hold For - Scheduling; Requestedfor:31Gfy0355;    Perform:[facility] Radiology; Due:76Dkf1752; Ordered; For:Left breast lump; Ordered By:Georgette Hampton;   · MAMMO DIAGNOSTIC BILATERAL W 3D & CAD; Status:Active; Requestedfor:53Wyb6385;    Perform:[facility] Radiology; Due:54Pwl6817; Ordered; For:Left breast lump; Ordered By:Georgette Hampton;  Follow-up visit in 1 year Evaluation and Treatment  Follow-up  Status: Hold For - Scheduling  Requested for: 17QAS9497 Ordered;   For: Encounter for gynecological examination with abnormal finding;  Ordered By: Elodia Leung  Performed:   Due: 08GOX4710   Discussion/Summary  health maintenance visit healthy adult female HPV and Pap Co-testing Done Today Breast cancer screening: monthly self breast exam was advised, mammogram has been ordered and slip given for B/L dx mammo with left breast ultrasound - pt is almost 6 months since d/c breastfeeding - treat based on results  Colorectal cancer screening: colorectal cancer screening is not indicated  Advice and education were given regarding weight bearing exercise, calcium supplements and vitamin D supplements  Patient discussion: discussed with the patient  Encourage MVI q day and r/keyla importance of folic acid; Encourage calcium and vit D through diet; Encourage 30-40 min weight bearing exercise most days of weekRTO for APE or sooner if needed  The patient has the current Goals: Routine gyn APE  The patent has the current Barriers: None  Patient is able to Self-Care  PATIENT EDUCATION RECORD She has no barriers to learning  Education Plan/Path:  She does not need further education classes  The treatment plan was reviewed with the patient/guardian  The patient/guardian understands and agrees with the treatment plan     Self Referrals: No      Chief Complaint  30 yr old APE      History of Present Illness  HPI: No vulvo-vaginal itch/burn/abn discharge or odor; No urinary sx - burning/pain/frequency/hematuria(-) SBEs - no asymmetry, nipple discharge, or changes in skin of breast b/l; History of a breast lump in left breast that she had aspirated in the past - during last pregnancy felt lump again in the same area - now no longer feels; No abd/pelvic pain or HAsNo sexual complaints; mutually monog/; declines std/hiv/hep testingContraception -  vasectomy and a couple follow-up checks but unsure if finalized(+) PCP for routine BW/care;    GYN HM, Adult Female [place]: The patient is being seen for a health maintenance and gynecology evaluation  Social History: She is   Work status: occupation: [employer]  The patient has never smoked cigarettes  She reports occasional alcohol use and drinking 1 drinks per week   She has never used illicit drugs  General Health: The patient's health since the last visit is described as good  Lifestyle:  She does not use tobacco -- She consumes alcohol -- She denies drug use  Reproductive health: the patient is premenopausal--   she reports no menstrual problems  Menstrual history:  age at menarche was 8  LMP: the last menstrual period was 12/3/17-- and-- it was of a normal amount and duration  Recent menstrual periods: bleeding has been normal  The cycles have been regular  The duration of her recent periods has been regular-- and-- usually last 28 days  Menstrual Problems: No abdoul/metrohagia/dysmenorrhea  -- she uses contraception  For contraception, she has a partner with a vasectomy  -- she is sexually active  She is monogamous with a male partner  Screening: Cervical cancer screening includes a pap smear performed 6/16/16 - ASCUS (-) HRHPV type 16/18 neg  Review of Systems  no pelvic pain,-- no pelvic pressure,-- no vaginal pain,-- no vaginal discharge,-- no vaginal itching,-- no vaginal lump or mass,-- no vaginal odor,-- no nonmenstrual bleeding,-- no vulvar pain,-- no vulvar itching,-- no vulvar lump or mass,-- no labial swelling,-- no dysmenorrhea,-- denied amenorrhea,-- no menorrhagia,-- no hypomenorrhea,-- no oligomenorrhea,-- no decreased time between periods,-- periods are regular,-- regular length of periods,-- no dysuria,-- no bladder pain,-- no hematuria,-- no burning sensation during urination,-- no change in urinary frequency,-- no feelings of urinary urgency,-- no urinary hesitancy-- and-- no urinary loss of control  Constitutional: No fever, no chills, feels well, no tiredness, no recent weight gain or loss  Cardiovascular: no chest pain  Respiratory: no shortness of breath  Breasts: no complaints of breast pain, breast lump or nipple discharge    Gastrointestinal: no complaints of abdominal pain, no constipation, no nausea or diarrhea, no vomiting, no bloody stools  Genitourinary: no complaints of dysuria, no incontinence, no pelvic pain, no dysmenorrhea, no vaginal discharge or abnormal vaginal bleeding  Neurological: no headache  OB History  Pregnancy History (Brief):  Prior pregnancies: : 7  Para: 2 (full-term),-- 1 (premature),-- 4 (abortions)-- and-- 4 (living)  Delivery type: 4 vaginal  Additional pregnancy history details: 2 elective (s)-- and-- 2 miscarriage(s)   :  SAB - D&C;   F;   F;  SAB - cytotec;   Twin M/M; 2 VIP (2017 CF neg)      Active Problems  1  Oral contraceptive use (V25 41) (Z30 41)   2  Pelvic pain in female (625 9) (R10 2)   3  Postpartum exam (V24 2) (Z39 2)    Past Medical History   · History of Dichorionic diamniotic twin pregnancy in third trimester (651 03,V91 03)(O30 043)   · History of Fibroadenoma of breast, unspecified laterality (217) (D24 9)   · History of Hereditary disease in family possibly affecting fetus (655 20) (O35 2XX0)   · History of pregnancy (V13 29)   · History of spontaneous  (V13 29) (Z87 59)   · History of urinary tract infection (V13 02) (Z87 440)    The active problems and past medical history were reviewed and updated today  Surgical History   · History of Breast Surgery Puncture Aspiration Of Cyst   · History of Dental Surgery   · History of Dilation And Curettage   · History of Nasal Septal Deviation Repair   · History of Surgical Treatment For    · History of Surgical Treatment Of Spontaneous     The surgical history was reviewed and updated today  Family History  Mother    · No pertinent family history  Father    · No pertinent family history  Maternal Grandmother    · Family history of hypertension (V17 49) (Z82 49)  Paternal Grandmother    · Family history of diabetes mellitus (V18 0) (Z83 3)  Maternal Aunt    · Family history of breast cancer (V16 3) (Z80 3)    The family history was reviewed and updated today  Social History   · Denied: History of H/O domestic violence   · Never a smoker   · No drug use   · Occasional alcohol use   · Oral contraceptive use (V25 41) (Z30 41)  The social history was reviewed and updated today  The social history was reviewed and is unchanged  Current Meds   1  Folic Acid 448 MCG Oral Tablet; Therapy: (Recorded:10Mar2017) to Recorded   2  Prenatal TABS; TAKE TABLET  per patient once daily; Therapy: (Recorded:10Mar2017) to Recorded   3  Vitamin D 2000 UNIT Oral Capsule; TAKE CAPSULE; Therapy: (Recorded:10Mar2017) to Recorded    Allergies  1  No Known Drug Allergies  2  No Known Environmental Allergies   3  No Known Food Allergies    Vitals   Recorded: 92OUD4651 01:51WM   Systolic 393, LUE, Sitting   Diastolic 68, LUE, Sitting   Height 5 ft 8 in   Weight 173 lb    BMI Calculated 26 3   BSA Calculated 1 92     Physical Exam  vitals r/keyla  Constitutional  General appearance: No acute distress, well appearing and well nourished  Neck  Neck: Normal, supple, trachea midline, no masses  Thyroid: Normal, no thyromegaly  Pulmonary  Respiratory effort: No increased work of breathing or signs of respiratory distress  Auscultation of lungs: Clear to auscultation  Cardiovascular  Auscultation of heart: Normal rate and rhythm, normal S1 and S2, no murmurs  Genitourinary  External genitalia: Normal and no lesions appreciated  Vagina: Normal, no lesions or dryness appreciated  Urethra: Normal    Urethral meatus: Normal    Bladder: Normal, soft, non-tender and no prolapse or masses appreciated  Cervix: Normal, no palpable masses  Uterus: Normal, non-tender, not enlarged, and no palpable masses  Adnexa/parametria: Normal, non-tender and no fullness or masses appreciated  Chest  Breasts: Abnormal   normal appearance  Examination of the nipples revealed normal appearance-- and-- no discharge  Right Breast:   No masses palpated -- (+) fibrocystic breast changes most prominent in upper inner and outer quadrants of breast   Left Breast: a single nodules was palpated--   (+) fibrocystic breast tissue upper inner and outer quadrants, however, soft movable lump appreciated in upper outer quadrant of left breast about 1-2 cm in size  Abdomen  Abdomen: Normal, non-tender, and no organomegaly noted  Liver and spleen: No hepatomegaly or splenomegaly  Lymphatic  Palpation of lymph nodes in neck, axillae, groin and/or other locations: No lymphadenopathy or masses noted  Skin  Skin and subcutaneous tissue: Normal skin turgor and no rashes  Palpation of skin and subcutaneous tissue: Normal    Psychiatric  Mood and affect: Normal        Attending Note  Collaborating Physician Note: Collaborating Note: I agree with the Advanced Practitioner note  I discussed the case with the Advanced Practitioner and reviewed the AP note      Signatures   Electronically signed by :  Lemont Meigs, [facility]; Dec 13 2017  2:31PM EST                       (Author)    Electronically signed by : Jl Dennis MD; Dec 14 2017 11:17AM EST                       (Author)

## 2017-12-18 LAB
HPV 18 (HISTORICAL): NOT DETECTED
HPV HIGH RISK 16/18 (HISTORICAL): NOT DETECTED
HPV16 (HISTORICAL): NOT DETECTED
PAP (HISTORICAL): NORMAL

## 2017-12-22 ENCOUNTER — HOSPITAL ENCOUNTER (OUTPATIENT)
Dept: ULTRASOUND IMAGING | Facility: CLINIC | Age: 37
Discharge: HOME | End: 2017-12-22
Payer: COMMERCIAL

## 2017-12-22 ENCOUNTER — HOSPITAL ENCOUNTER (OUTPATIENT)
Dept: MAMMOGRAPHY | Facility: CLINIC | Age: 37
Discharge: HOME | End: 2017-12-22
Payer: COMMERCIAL

## 2017-12-22 DIAGNOSIS — N63.20 MASS OF LEFT BREAST: ICD-10-CM

## 2017-12-22 PROCEDURE — G0204 DX MAMMO INCL CAD BI: HCPCS

## 2017-12-22 PROCEDURE — G0279 TOMOSYNTHESIS, MAMMO: HCPCS

## 2017-12-22 PROCEDURE — 76642 ULTRASOUND BREAST LIMITED: CPT

## 2018-01-10 NOTE — RESULT NOTES
Verified Results  (1) SEQUENTIAL SCREEN 2 [id number] 11:59AM Lupe Fruits     Test Name Result Flag Reference   SCAN RESULT      Results available in the [facility], [facility]

## 2018-01-11 NOTE — PROGRESS NOTES
2017         RE: Nikia Bustillo                                  To: [facility]   MR#: [id number]                                    [address]   : [facility], [address] [place] [address]   ENC: [id number]                             Fax: [phone]   (Exam #: [id number])      The LMP of this 31year old,  [address], P2-0-4-2 patient was OCT 17 2016, giving   her an NITIN of 2017 and a current gestational age of 23 weeks 4 days   by dates  A sonographic examination was performed on 2017 using real   time equipment  The ultrasound examination was performed using abdominal   technique  The patient has a BMI of 29 5  Her blood pressure today was   95/50  Earliest ultrasound found in her record: 16  8w6d  NITIN 17      Problem list:   1  Spontaneous dichorionic diamniotic twin pregnancy with 2 boys, separate   placentas and central placental cord insertions  2  Family history of post axial polydactyly in the father of the baby and   multiple other family members  3  History of a prior 9 pound baby      With the exception of back discomfort, Zuleika has no complaints  She   reports regular fetal movement and denies problems related to vaginal   bleeding,  labor, hypertension, or gestational diabetes  She   remains treated with low-dose aspirin daily to reduce her risk for   preeclampsia        Fetus A   Cardiac motion was observed at 145 bpm       Fetus B   Cardiac motion was observed at 157 bpm       INDICATIONS      multiple gestation  diamniotic dichorionic twins   Evaluate missed anatomy      Exam Types      Level I      RESULTS      Fetus # 1 of 2   Breech presentation   Fetal growth appeared normal   Placenta Location = Anterior   No placenta previa   Placenta Grade = I   Chorionicity = Dichorionic, Diamniotic      Fetus # 2 of 2   Vertex presentation   Fetal growth appeared normal   Placenta Location = Posterior   No placenta previa   Placenta Grade = I   Chorionicity = Dichorionic, Diamniotic      MEASUREMENTS (* Included In Average GA)      FETUS A   AC              20 3 cm        24 weeks 5 days* (49%)   BPD              6 0 cm        24 weeks 4 days* (45%)   HC              22 7 cm        24 weeks 4 days* (42%)   Femur            4 7 cm        25 weeks 6 days* (64%)      Cerebellum       2 9 cm        27 weeks 0 days      HC/AC           1 12   FL/AC           0 23   FL/BPD          0 78   EFW (Ac/Fl/Hc)   776 grams - 1 lbs 11 oz                 (56%)      Fetus A AVERAGE G  A  is 24 weeks 6 days +/- 14 days  FETUS B   AC              19 3 cm        23 weeks 6 days* (32%)   BPD              6 1 cm        24 weeks 5 days* (50%)   HC              23 3 cm        25 weeks 1 day * (55%)   Femur            4 7 cm        25 weeks 5 days* (61%)      Cerebellum       2 8 cm        25 weeks 6 days      HC/AC           1 21   FL/AC           0 24   FL/BPD          0 77   EFW (Ac/Fl/Hc)   730 grams - 1 lbs 10 oz                 (49%)      Fetus B AVERAGE G  A  is 24 weeks 6 days +/- 14 days  AMNIOTIC FLUID      Fetus A      Largest Vertical Pocket = 6 5 cm   Amniotic Fluid: Normal         Fetus B      Largest Vertical Pocket = 4 5 cm   Amniotic Fluid: Normal      ANATOMY DETAILS      Fetus A   Visualized Appearing Sonographically Normal:   HEAD: (Calvarium, BPD Level, Cavum, Lateral Ventricles, Cerebellum);      FACE/NECK: (Profile);    TH  CAV : (Diaphragm); HEART: (Four Chamber   View, Proximal Left Outflow, Proximal Right Outflow, Short Axis of Greater   Vessels, Cardiac Axis, Cardiac Position);    STOMACH, RIGHT KIDNEY, LEFT   KIDNEY, BLADDER, PLACENTA      Fetus B   Visualized Appearing Sonographically Normal:   HEAD: (Calvarium, BPD Level, Cavum, Lateral Ventricles, Cerebellum);      TH  CAV : (Diaphragm);     HEART: (Four Chamber View, Proximal Left   Outflow, Proximal Right Outflow, Short Axis of Greater Vessels, Interventricular Septum, Interatrial Septum, IVC, SVC, Cardiac Axis,   Cardiac Position);    STOMACH, RIGHT KIDNEY, LEFT KIDNEY, BLADDER, PLACENTA      ANATOMY COMMENTS      FETUS B   The prior fetal anatomic survey of fetus "B" was limited with respect to   evaluation of the cardiac four-chamber, septal and caval views, along with   the short axis view of the great vessels  These anatomic views were imaged   today and appear normal       IMPRESSION      Twin IUP (Fetus A)   24 weeks and 6 days by this ultrasound  (NITIN=JUL 22 2017)   Breech presentation   Fetal growth appeared normal   Regular fetal heart rate of 145 bpm   Anterior placenta   No placenta previa   Dichorionic, diamniotic      Twin IUP (Fetus B)   24 weeks and 6 days by this ultrasound  (NITIN=JUL 22 2017)   Vertex presentation   Fetal growth appeared normal   Regular fetal heart rate of 157 bpm   Posterior placenta   No placenta previa   Dichorionic, diamniotic      GENERAL COMMENT      No fetal structural abnormalities are identified on the Level I surveys   today  Fetal interval growth and amniotic fluid volumes are normal  The   placentas are normal in appearance  Today's ultrasound findings and suggested follow-up were discussed in   detail with Zuleika and her   She will return to the [address] [place] [address] in 4 weeks to assess fetal interval growth  Serial growth scans are   then recommended during the third trimester  Weekly non stress testing is   recommended for additional pregnancy surveillance beginning at 32 weeks   gestation, sooner if otherwise clinically indicated  Continuation of   daily low dose aspirin therapy is recommended  The face to face time, in addition to time spent discussing ultrasound   results, was approximately 10 minutes, greater than 50% of which was spent   during counseling and coordination of care  HILARIO Santana M D     Maternal-Fetal Medicine   Electronically signed 04/07/17 14:28

## 2018-01-13 VITALS
HEIGHT: 68 IN | BODY MASS INDEX: 27.89 KG/M2 | SYSTOLIC BLOOD PRESSURE: 100 MMHG | DIASTOLIC BLOOD PRESSURE: 55 MMHG | WEIGHT: 184.04 LBS

## 2018-01-13 VITALS — BODY MASS INDEX: 24.63 KG/M2 | WEIGHT: 162 LBS | DIASTOLIC BLOOD PRESSURE: 74 MMHG | SYSTOLIC BLOOD PRESSURE: 116 MMHG

## 2018-01-13 VITALS
SYSTOLIC BLOOD PRESSURE: 99 MMHG | DIASTOLIC BLOOD PRESSURE: 58 MMHG | WEIGHT: 205.03 LBS | HEIGHT: 68 IN | BODY MASS INDEX: 31.07 KG/M2

## 2018-01-14 VITALS
HEIGHT: 68 IN | SYSTOLIC BLOOD PRESSURE: 92 MMHG | WEIGHT: 163.8 LBS | BODY MASS INDEX: 24.83 KG/M2 | DIASTOLIC BLOOD PRESSURE: 54 MMHG

## 2018-01-14 VITALS
WEIGHT: 194.4 LBS | HEIGHT: 68 IN | DIASTOLIC BLOOD PRESSURE: 50 MMHG | BODY MASS INDEX: 29.46 KG/M2 | SYSTOLIC BLOOD PRESSURE: 95 MMHG

## 2018-01-14 VITALS — WEIGHT: 218 LBS | DIASTOLIC BLOOD PRESSURE: 76 MMHG | BODY MASS INDEX: 33.15 KG/M2 | SYSTOLIC BLOOD PRESSURE: 110 MMHG

## 2018-01-14 VITALS
SYSTOLIC BLOOD PRESSURE: 102 MMHG | WEIGHT: 212 LBS | BODY MASS INDEX: 32.13 KG/M2 | HEIGHT: 68 IN | DIASTOLIC BLOOD PRESSURE: 65 MMHG

## 2018-01-14 VITALS
DIASTOLIC BLOOD PRESSURE: 65 MMHG | WEIGHT: 218.6 LBS | HEIGHT: 68 IN | BODY MASS INDEX: 33.13 KG/M2 | SYSTOLIC BLOOD PRESSURE: 133 MMHG

## 2018-01-14 VITALS
BODY MASS INDEX: 32.89 KG/M2 | HEIGHT: 68 IN | WEIGHT: 217.05 LBS | SYSTOLIC BLOOD PRESSURE: 111 MMHG | DIASTOLIC BLOOD PRESSURE: 69 MMHG

## 2018-01-14 NOTE — PROGRESS NOTES
MAR 10 2017         RE: Bita Tinajero                                  To: [facility]   MR#: [id number]                                    [address]   : [address]   ENC: [id number]                             Fax: [phone]   (Exam #: [id number])      The LMP of this 31year old,  [address] [facility], P2-0-4-2 patient was OCT 17 2016, giving   her an NITIN of 2017 and a current gestational age of 25 weeks 4 days   by dates  A sonographic examination was performed on MAR 10 2017 using   real time equipment  The ultrasound examination was performed using   abdominal & vaginal techniques  The patient has a BMI of 28 1  Her blood   pressure today was 100/55  Earliest ultrasound found in her record: 16  8w6d  NITIN 17      Problem list:   1  Spontaneous dichorionic diamniotic twin pregnancy with 2 boys, separate   placentas and central placental cord insertions  2  Family history of post axial polydactyly in the father of the baby and   multiple other family members     3  History of a prior 9 pound baby      Fetus A   Cardiac motion was observed at 148 bpm       Fetus B   Cardiac motion was observed at 148 bpm       INDICATIONS      multiple gestation  diamniotic dichorionic twins   fetal anatomical survey      Exam Types      Transvaginal   LEVEL II      RESULTS      Fetus # 1 of 2   Breech presentation   Fetal growth appeared normal   Fetal position = Inferior, Maternal Right   Placenta Location = Anterior   No placenta previa   Placenta Grade = I   Chorionicity = Dichorionic, Diamniotic      Fetus # 2 of 2   Breech presentation   Fetal growth appeared normal   Fetal position = Superior, Maternal Left   Placenta Location = Posterior   No placenta previa   Placenta Grade = I   Chorionicity = Dichorionic, Diamniotic      MEASUREMENTS (* Included In Average GA)      FETUS A   AC              15 1 cm        20 weeks 0 days* (39%)   BPD              5 0 cm        21 weeks 0 days* (58%)   HC              18 5 cm        20 weeks 5 days* (51%)   Femur            3 5 cm        21 weeks 2 days* (58%)      Nuchal Fold      3 1 mm   NBL              5 1 mm      Humerus          3 4 cm        21 weeks 5 days  (72%)      Cerebellum       2 2 cm        21 weeks 5 days   Biorbit          3 3 cm        21 weeks 1 day   CisternaMagna    4 0 mm      HC/AC           1 23   FL/AC           0 24   FL/BPD          0 71   EFW (Ac/Fl/Hc)   369 grams - 0 lbs 13 oz      Fetus A AVERAGE G  A  is 20 weeks 6 days +/- 10 days  FETUS B   AC              15 4 cm        20 weeks 2 days* (45%)   BPD              4 8 cm        20 weeks 4 days* (47%)   HC              18 5 cm        20 weeks 6 days* (52%)   Femur            3 5 cm        21 weeks 1 day * (53%)      Nuchal Fold      3 7 mm      Humerus          3 2 cm        20 weeks 6 days  (53%)      Cerebellum       2 2 cm        21 weeks 4 days   Biorbit          3 4 cm        21 weeks 4 days   CisternaMagna    4 1 mm      HC/AC           1 20   FL/AC           0 22   FL/BPD          0 72   EFW (Ac/Fl/Hc)   374 grams - 0 lbs 13 oz      Fetus B AVERAGE G  A  is 20 weeks 5 days +/- 10 days  AMNIOTIC FLUID      Fetus A      Largest Vertical Pocket = 4 9 cm   Amniotic Fluid: Normal         Fetus B      Largest Vertical Pocket = 3 9 cm   Amniotic Fluid: Normal      CERVICAL EVALUATION      The cervix appeared normal (Ultrasound Examination)  SUPINE      Cervical Length: 3 30 cm      OTHER TEST RESULTS           Funneling?: No             Dynamic Changes?: No        Resp  To TFP?: No      ANATOMY      Fetus A   Head                                    Normal   Face/Neck                               Normal   Th  Cav  Normal   Heart                                   Normal   Abd  Cav                                 Normal   Stomach                                 Normal   Right Kidney                            Normal   Left Kidney                             Normal   Bladder                                 Normal   Abd  Wall                               Normal   Spine                                   Normal   Extrems                                 Normal   Genitalia                               Normal   Placenta                                Normal   Umbl  Cord                              Normal   Uterus                                  Normal   PCI                                     Normal      Fetus B   Head                                    Normal   Face/Neck                               Normal   Th  Cav  Normal   Heart                                   See Details   Abd  Cav  Normal   Stomach                                 Normal   Right Kidney                            Normal   Left Kidney                             Normal   Bladder                                 Normal   Abd  Wall                               Normal   Spine                                   Normal   Extrems                                 Normal   Genitalia                               Normal   Placenta                                Normal   Umbl  Cord                              Normal   Uterus                                  Normal   PCI                                     Normal      ANATOMY DETAILS      Fetus A   Visualized Appearing Sonographically Normal:   HEAD: (Calvarium, BPD Level, Cavum, Lateral Ventricles, Choroid Plexus,   Cerebellum, Cisterna Magna);    FACE/NECK: (Neck, Nuchal Fold, Profile,   Orbits, Nose/Lips, Palate, Face);    TH  CAV  : (Lungs, Diaphragm);       HEART: (Four Chamber View, Proximal Left Outflow, Proximal Right Outflow,   3VV, 3 Vessel Trachea, Short Axis of Greater Vessels, Ductal Arch, Aortic   Arch, Interventricular Septum, Interatrial Septum, IVC, SVC, Cardiac Axis,   Cardiac Position);    ABD  CAV : (Liver);    STOMACH, RIGHT KIDNEY, LEFT   KIDNEY, BLADDER, ABD  WALL, SPINE: (Cervical Spine, Thoracic Spine, Lumbar   Spine, Sacrum);    EXTREMS: (Lt Humerus, Rt Humerus, Lt Forearm, Rt   Forearm, Lt Hand, Rt Hand, Lt Femur, Rt Femur, Lt Low Leg, Rt Low Leg, Lt   Foot, Rt Foot);    GENITALIA (Male), PLACENTA, UMBL  CORD, UTERUS, PCI      Fetus B   Visualized Appearing Sonographically Normal:   HEAD: (Calvarium, BPD Level, Cavum, Lateral Ventricles, Choroid Plexus,   Cerebellum, Cisterna Magna);    FACE/NECK: (Neck, Nuchal Fold, Profile,   Orbits, Nose/Lips, Palate, Face);    TH  CAV  : (Lungs, Diaphragm); HEART: (Proximal Left Outflow, Proximal Right Outflow, 3VV, 3 Vessel   Trachea, Ductal Arch, Aortic Arch, Cardiac Axis, Cardiac Position);      ABD  CAV : (Liver);    STOMACH, RIGHT KIDNEY, LEFT KIDNEY, BLADDER, ABD  WALL, SPINE: (Cervical Spine, Thoracic Spine, Lumbar Spine, Sacrum);      EXTREMS: (Lt Humerus, Rt Humerus, Lt Forearm, Rt Forearm, Lt Hand, Rt   Hand, Lt Femur, Rt Femur, Lt Low Leg, Rt Low Leg, Lt Foot, Rt Foot);      GENITALIA (Male), PLACENTA, UMBL  CORD, UTERUS, PCI      Not Visualized:   HEART: ([facility] [employer] game platform [facility], [place] of Greater Vessels, Interventricular   Septum, Interatrial Septum, IVC, SVC)      ANATOMY COMMENTS      FETUS A    Her survey of the fetal anatomy is complete  No fetal structural abnormality or ultrasound marker for aneuploidy is   identified on the Level II ultrasound study today  Fetal growth and   amniotic fluid volume appear normal   Active movement of the fetal body &   extremities was seen  There is no suspicion of a subchorionic bleed  The   placental cord insertion was normal             FETUS B   Her survey of the fetal anatomy is not complete  No fetal structural abnormality or ultrasound marker for aneuploidy is   identified on the Level II ultrasound study today    The missed or limited   views above are secondary to her skin thickness, fetal position, late   gestational age  Fetal growth and amniotic fluid volume appear normal     Active movement of the fetal body & extremities was seen  There is no   suspicion of a subchorionic bleed  The placental cord insertion was   normal       ADNEXA      The left ovary appeared normal and measured 2 5 x 1 8 x 1 8 cm with a   volume of 4 2 cc  The right ovary appeared normal and measured 2 1 x 1 6 x   1 3 cm with a volume of 2 3 cc  IMPRESSION      Twin IUP (Fetus A)   20 weeks and 6 days by this ultrasound  (NITIN=JUL 22 2017)   Breech presentation   Fetal growth appeared normal   Normal anatomy survey   Regular fetal heart rate of 148 bpm   Anterior placenta   No placenta previa   Fetal position = Inferior, Right   Dichorionic, diamniotic      Twin IUP (Fetus B)   20 weeks and 5 days by this ultrasound  (NITIN=JUL 23 2017)   Breech presentation   Fetal growth appeared normal   Regular fetal heart rate of 148 bpm   Posterior placenta   No placenta previa   Fetal position = Superior, Left   Dichorionic, diamniotic      GENERAL COMMENT      The patient was informed of the findings and counseled about the   limitations of the exam in detecting all forms of fetal congenital   abnormalities  She denies any vaginal bleeding or uterine cramping/contractions  She does   feel fetal movement  Exam shows she is comfortable and her abdomen is non tender  Follow up recommended:   1  Recommend a follow-up ultrasound in 4 weeks for growth and missed   anatomy  2  Recommend an early diabetes screen secondary to her twin pregnancy, age   of 31 and prior baby that weighed 9 pounds  3  Her sequential screen was normal with a one and 8400 risk for [facility] and   one in 420 risk for neural tube defect  Trisomy 18 risk cannot be   accurately estimated in twin pregnancies with a sequential screen           The face to face time, in addition to time spent discussing ultrasound   results, was approximately 15 minutes, greater than 50% of which was spent   during counseling and coordination of care  HILARIO Coulter M D     Maternal-Fetal Medicine   Electronically signed 03/11/17 10:09

## 2018-01-15 VITALS
HEIGHT: 68 IN | DIASTOLIC BLOOD PRESSURE: 53 MMHG | BODY MASS INDEX: 33.19 KG/M2 | SYSTOLIC BLOOD PRESSURE: 106 MMHG | WEIGHT: 219 LBS

## 2018-01-15 NOTE — PROGRESS NOTES
VICTOR M 15 2017         RE: Arlauren Hari                                  To: [facility]   MR#: [id number]                                    [address]   : [address]   ENC: [id number]                             Fax: [phone]   (Exam #: [id number])      The LMP of this 31year old,  G7, P2-0-4-2 patient was OCT 17 2016, giving   her an NITIN of 2017 and a current gestational age of 29 weeks 3 days   by dates  A sonographic examination was performed on VICTOR M 15 2017 using   real time equipment  The ultrasound examination was performed using   abdominal technique  The patient has a BMI of 33 0  Her blood pressure   today was 111/69  Earliest ultrasound found in her record: 16  8w6d  NITIN 17      Fetus A   Cardiac motion was observed at 135 bpm       Fetus B   Cardiac motion was observed at 148 bpm       INDICATIONS      multiple gestation  diamniotic dichorionic twins      Exam Types      Amniotic Fluid Index      RESULTS      Fetus # 1 of 2   Vertex presentation   Fetal position = Inferior, Maternal Right   Placenta Location = Anterior   No placenta previa   Placenta Grade = II      Fetus # 2 of 2   Vertex presentation   Fetal position = Superior, Maternal Left   Placenta Location = Posterior   No placenta previa   Placenta Grade = II      AMNIOTIC FLUID      Fetus A      Largest Vertical Pocket = 4 0 cm   Amniotic Fluid: Normal         Fetus B      Largest Vertical Pocket = 2 7 cm   Amniotic Fluid: Normal      BIOPHYSICAL PROFILE      Fetus A   The Biophysical Profile score was 10/10  Breathin  Movement: 2  Tone: 2  AFV: 2  NST: 2      Fetus B   The Biophysical Profile score was 10/10     Breathin  Movement: 2  Tone: 2  AFV: 2  NST: 2      IMPRESSION      Twin IUP (Fetus A)   Vertex presentation   Regular fetal heart rate of 135 bpm   Anterior placenta   No placenta previa   Fetal position = Inferior, Right      Twin IUP (Fetus B)   Vertex presentation   Regular fetal heart rate of 148 bpm   Posterior placenta   No placenta previa   Fetal position = Superior, Left      GENERAL COMMENT      The patient presented today for antepartum fetal surveillance secondary to   twin pregnancy  She had a reassuring biophysical profile on each twin  The NST was reactive and reassuring on each twin  Recommend continued weekly fetal testing secondary to twin pregnancy  Thank very much for allowing us to participate in the care of your   patient  Should you have any questions, please do not hesitate to contact   our office  HILARIO Mayo M D     Electronically signed 06/19/17 13:18

## 2018-01-16 NOTE — PROGRESS NOTES
2017         RE: Marisabel Lezama                                  To: [facility]   MR#: [id number]                                    [address] [facility]   : [facility] [address] [place] [address]   ENC: [id number]                             Fax: [phone]   (Exam #: [id number])      The LMP of this 31year old,  G7, P2-0-4-2 patient was OCT 17 2016, giving   her an NITIN of 2017 and a current gestational age of 29 weeks 3 days   by dates  A sonographic examination was performed on 2017 using real   time equipment  The ultrasound examination was performed using abdominal   technique  The patient has a BMI of 33 3  Her blood pressure today was   106/53  Earliest ultrasound found in her record: 16  8w6d  NITIN 17      Fetus A   Cardiac motion was observed at 144 bpm       Fetus B   Cardiac motion was observed at 149 bpm       INDICATIONS      multiple gestation  diamniotic dichorionic twins      Exam Types      Amniotic Fluid Index      RESULTS      Fetus # 1 of 2   Vertex presentation   Fetal position = Inferior, Maternal Right   Placenta Location = Anterior   No placenta previa   Placenta Grade = II      Fetus # 2 of 2   Vertex presentation   Fetal position = Superior, Maternal Left   Placenta Location = Posterior   No placenta previa   Placenta Grade = II      Fetus A   The NST was reactive with no decelerations  Fetus B   The NST was reactive with no decelerations        AMNIOTIC FLUID      Fetus A      Largest Vertical Pocket = 6 0 cm   Amniotic Fluid: Normal         Fetus B      Largest Vertical Pocket = 6 0 cm   Amniotic Fluid: Normal      IMPRESSION      Twin IUP (Fetus A)   Vertex presentation   Regular fetal heart rate of 144 bpm   Anterior placenta   No placenta previa   Fetal position = Inferior, Right      Twin IUP (Fetus B)   Vertex presentation   Regular fetal heart rate of 149 bpm   Posterior placenta   No placenta previa   Fetal position = Superior, Left      GENERAL COMMENT         Follow-up fetal surveillance includes once weekly NST's and MARCI's, along   with daily kick counts  HILARIO Ryna M D     Maternal-Fetal Medicine   Electronically signed 06/08/17 22:11

## 2018-01-16 NOTE — PROGRESS NOTES
MAY 5 2017         RE: Marty Ledezma                                  To: [facility]   MR#: [id number]                                    [address]   : [facility], [address] [place] [address]   ENC: [id number]                             Fax: [phone]   (Exam #: [id number])      The LMP of this 31year old,  [address] [facility], P2-0-4-2 patient was OCT 17 2016, giving   her an NITIN of 2017 and a current gestational age of 35 weeks 4 days   by dates  A sonographic examination was performed on MAY 5 2017 using real   time equipment  The ultrasound examination was performed using abdominal   technique  The patient has a BMI of 31 2  Her blood pressure today was   99/58  Earliest ultrasound found in her record: 16  8w6d  NITIN 17      Problem list:   1  Spontaneous dichorionic diamniotic twin pregnancy with 2 boys, separate   placentas and central placental cord insertions  2  Family history of post axial polydactyly in the father of the baby and   multiple other family members     3  History of a prior 9 pound baby      Fetus A   Cardiac motion was observed at 141 bpm       Fetus B   Cardiac motion was observed at 159 bpm       INDICATIONS      multiple gestation  diamniotic dichorionic twins   Evaluate missed anatomy   Interval growth assesment      Exam Types      Level I      RESULTS      Fetus # 1 of 2   Vertex presentation   Fetal growth appeared normal   Fetal position = Inferior, Maternal Right   Placenta Location = Anterior   No placenta previa   Placenta Grade = I   Chorionicity = Dichorionic, Diamniotic      Fetus # 2 of 2   Vertex presentation   Fetal growth appeared normal   Fetal position = Superior, Maternal Left   Placenta Location = Posterior   No placenta previa   Placenta Grade = I   Chorionicity = Dichorionic, Diamniotic      MEASUREMENTS (* Included In Average GA)      FETUS A   AC              24 1 cm        28 weeks 2 days* (41%) BPD              7 5 cm        30 weeks 1 day * (73%)   HC              27 7 cm        30 weeks 0 days* (62%)   Femur            5 7 cm        29 weeks 6 days* (64%)      Cerebellum       3 7 cm        32 weeks 0 days      HC/AC           1 15   FL/AC           0 24   FL/BPD          0 76   EFW (Ac/Fl/Hc)  1356 grams - 2 lbs 15 oz                 (52%)      Fetus A AVERAGE G  A  is 29 weeks 4 days +/- 18 days  FETUS B   AC              25 0 cm        29 weeks 1 day * (55%)   BPD              7 3 cm        29 weeks 1 day * (53%)   HC              27 4 cm        29 weeks 4 days* (55%)   Femur            5 6 cm        29 weeks 2 days* (52%)      Cerebellum       3 5 cm        30 weeks 5 days      HC/AC           1 10   FL/AC           0 22   FL/BPD          0 76   EFW (Ac/Fl/Hc)  1371 grams - 3 lbs 0 oz                 (53%)      Fetus B AVERAGE G  A  is 29 weeks 2 days +/- 18 days  AMNIOTIC FLUID      Fetus A      Largest Vertical Pocket = 6 7 cm   Amniotic Fluid: Normal         Fetus B      Largest Vertical Pocket = 4 8 cm   Amniotic Fluid: Normal      ANATOMY DETAILS      Fetus A   Visualized Appearing Sonographically Normal:   HEAD: (Calvarium, BPD Level, Cavum, Lateral Ventricles, Cerebellum); HEART: (Four Chamber View, Proximal Left Outflow, Proximal Right Outflow,   3 Vessel Trachea, Cardiac Axis, Cardiac Position);    STOMACH, RIGHT   KIDNEY, LEFT KIDNEY, BLADDER, PLACENTA      Fetus B   Visualized Appearing Sonographically Normal:   HEAD: (Calvarium, BPD Level, Cavum, Lateral Ventricles, Cerebellum); HEART: (Four Chamber View, Proximal Left Outflow, Proximal Right Outflow,   3 Vessel Trachea, IVC, SVC, Cardiac Axis, Cardiac Position);    STOMACH,   RIGHT KIDNEY, LEFT KIDNEY, BLADDER, PLACENTA      ANATOMY COMMENTS      FETUS B   The prior fetal anatomic survey was limited the area of the SVC/IVC      These anatomic views were seen today as sonographically normal within the   inherent limitations of fetal ultrasound  IMPRESSION      Twin IUP (Fetus A)   29 weeks and 4 days by this ultrasound  (NITIN=2017)   Vertex presentation   Fetal growth appeared normal   Regular fetal heart rate of 141 bpm   Anterior placenta   No placenta previa   Fetal position = Inferior, Right   Dichorionic, diamniotic      Twin IUP (Fetus B)   29 weeks and 2 days by this ultrasound  (NITIN=2017)   Vertex presentation   Fetal growth appeared normal   Regular fetal heart rate of 159 bpm   Posterior placenta   No placenta previa   Fetal position = Superior, Left   Dichorionic, diamniotic      GENERAL COMMENT      On exam today the patient appears well, in no acute distress, and denies   any complaints  Her abdomen is non-tender  There has been appropriate and concordant twin interval fetal growth  Good fetal movement and tone are seen  The amniotic fluid volume appears   normal   The patient was informed of today's findings and all of her   questions were answered  The limitations of ultrasound were reviewed with   the patient   labor precautions and fetal kick counts were   reviewed  We discussed appropriate follow-up in detail and I recommend the patient   return in 4 weeks for a fetal growth evaluation and to initiate weekly    surveillance for the indication of dichorionic twins  Thank you very much for allowing us to participate in the care of this   very nice patient  Should you have any questions, please do not hesitate   to contact our office  Please note, in addition to the time spent discussing the results of the   ultrasound, I spent approximately 10 minutes of face-to-face time with the   patient, greater than 50% of which was spent in counseling and the   coordination of care for this patient  HILARIO Roman M D     Electronically signed 17 14:43

## 2018-01-16 NOTE — PROGRESS NOTES
2017         RE: Xenia Rodriguez                                  To: [facility]   MR#: [id number]                                    [address] [facility]   : [facility], [address] [place] [address]   ENC: [id number]                             Fax: [phone]   (Exam #: [id number])      The LMP of this 31year old,  [address] [facility], P2-0-4-2 patient was OCT 17 2016, giving   her an NITIN of 2017 and a current gestational age of 16 weeks 4 days   by dates  A sonographic examination was performed on 2017 using   real time equipment  The ultrasound examination was performed using   abdominal technique  The patient has a BMI of 24 9  Her blood pressure   today was 92/54  Earliest ultrasound found in her record: 16  8w6d  NITIN 17   Multiple longitudinal and transverse sections revealed a twin intrauterine   pregnancy  Fetus A is in variable presentation and fetus B is in variable   presentation  The placenta for Fetus A is anterior in implantation  The   placenta for Fetus B is posterior in implantation, grade 0 in appearance  Fetus A   Cardiac motion was observed at 169 bpm       Fetus B   Cardiac motion was observed at 166 bpm       INDICATIONS      first trimester genetic screening   multiple gestation  diamniotic dichorionic twins      Exam Types      Level I   sequential screen      RESULTS      Fetus # 1 of 2   Variable presentation   Fetal growth appeared normal   Fetal position = Maternal Left      Fetus # 2 of 2   Variable presentation   Fetal growth appeared normal   Fetal position = Maternal Right      MEASUREMENTS (* Included In Average GA)      FETUS A   CRL              7 5 cm        13 weeks 2 days*   Nuchal Trans    1 70 mm      Fetus A AVERAGE G  A  is 13 weeks 2 days +/- 7 days  FETUS B   CRL              7 2 cm        13 weeks 1 day *   Nuchal Trans    1 60 mm      Fetus B AVERAGE G  A  is 13 weeks 1 day +/- 7 days  ANATOMY COMMENTS      FETUS A   Anatomic detail of fetus "A" is limited at this gestational age  The yolk   sac was not noted  The fetal cranium appeared normal in shape and the   nuchal translucency was normal in size at 1 7 mm  The nasal bone could   not be evaluated  The intracranial anatomy was unremarkable  Evaluation   of the spine revealed no obvious evidence for a neural tube defect  Anatomy of the fetal thorax appeared within normal limits  The cardiac   rhythm was regular  Within the abdomen, stomach & bladder were visualized   and the abdominal wall appeared intact  A three vessel cord appears to be   present  Active movement of the fetal body & extremities was seen  There   is no suspicion of a subchorionic bleed  The placental cord insertion was   normal    There is no suspicion of a uterine myoma  Free fluid is not seen   in the posterior cul-de-sac  FETUS B   Anatomic detail of fetus "B" is limited at this gestational age  The yolk   sac was not noted  The fetal cranium appeared normal in shape and the   nuchal translucency was normal in size at 1 6 mm  The nasal bone appears   to be present  The intracranial anatomy was unremarkable  Evaluation of   the spine revealed no obvious evidence for a neural tube defect  Anatomy   of the fetal thorax appeared within normal limits  The cardiac rhythm was   regular  Within the abdomen, stomach & bladder were visualized and the   abdominal wall appeared intact  A three vessel cord appears to be present  Active movement of the fetal body & extremities was seen  There is no   suspicion of a subchorionic bleed  The placental cord insertion was   normal       ADNEXA      The left ovary appeared normal and measured 2 7 x 2 1 x 1 4 cm with a   volume of 4 2 cc  The right ovary was not visualized        AMNIOTIC FLUID      Fetus A      Largest Vertical Pocket = 2 8 cm   Amniotic Fluid: Normal         Fetus B      Largest Vertical Pocket = 2 7 cm   Amniotic Fluid: Normal      IMPRESSION      Twin IUP (Fetus A)   13 weeks and 2 days by this ultrasound  (NITIN=2017)   Variable presentation   Fetal growth appeared normal   Regular fetal heart rate of 169 bpm   Anterior placenta   Fetal position = Maternal , Left   Dichorionic, diamniotic      Twin IUP (Fetus B)   13 weeks and 1 day by this ultrasound  (NITIN=2017)   Variable presentation   Fetal growth appeared normal   Regular fetal heart rate of 166 bpm   Posterior placenta   Fetal position = Maternal , Right   Dichorionic, diamniotic      GENERAL COMMENT      Separate placentas are present  A thick, multilayered dividing membrane is   present  This appears to be a dichorionic, diamniotic twin pregnancy  CONSULT COMMENT      Thank you very much for your kind referral of Tramaine Major to the   [facility], [facility]  in [place] on 2017 for first trimester   ultrasound evaluation, genetic screening, and  consult  Rebecca Garcia   is a 62-year-old white female  7 para  who is currently at   12-4/7 weeks gestation by an estimated due date of 2017  She is   referred for the indication of a spontaneously conceived twin pregnancy  Crystal has no complaints today  She denies vaginal bleeding  Her early   prenatal course has been unremarkable so far  Rebecca Garcia has a history of 2 prior vaginal deliveries at term in  and    following uncomplicated prenatal courses  She delivered babies with   birth weights of 8 pounds and 9 pounds, each currently healthy  She also   has a history of 2 first trimester spontaneous pregnancy losses and 2   first trimester elective abortions  Her past medical and surgical   histories are otherwise unremarkable  She currently takes no medication   with the exception of a prenatal vitamin, folic acid, and vitamin D   supplementation on a daily basis and has no known drug allergy   She denies   tobacco, alcohol, or illicit drug use during the pregnancy  The family   medical history is negative with respect to first degree relatives with   diabetes, hypertension, venous thromboembolism, or thyroid disease  Her   sister has a diagnosis of [place] syndrome  The family genetic   history is otherwise negative with respect to genetic abnormalities, birth   defects, or mental retardation  Today's ultrasound findings and suggested follow-up were discussed in   detail with Zuleika  At age 31, with a dichorionic twin pregnancy, her   risk for live-born baby with Down syndrome is one in 1  The Sequential   Screen was discussed in detail, including the sensitivities for detection   of Down syndrome and open neural tube defects with a twin pregnancy  Definitive prenatal diagnosis is possible only through genetic   amniocentesis or CVS   Zuleika underwent fingerstick blood collection for   hCG and EAMON-A to complete the initial component of the Sequential Screen  Results should be available within one week  Follow-up multiple marker   serum screening at 16-18 weeks gestation is recommended to complete the   Sequential Screen  Fetal Level II ultrasound imaging is scheduled at   about 20 weeks gestation  Zuleika and I discussed an average gestational age at delivery of twins of   about 35-1/2 weeks  We discussed an increased risk for the indications of   a dichorionic twin pregnancy, including spontaneous  birth,   preeclampsia, and fetal growth restriction  I recommended initiation of   treatment with 81 mg of aspirin a day which will significantly reduce her   risk for the development of preeclampsia  Aspirin therapy is recommended   through about 35 weeks gestation  Serial fetal growth scans are   recommended during the second half of the pregnancy  Weekly nonstress   testing is recommended for additional pregnancy surveillance beginning at   32 weeks gestation, earlier if otherwise clinically indicated   Delivery of   this dichorionic twin pregnancy is recommended at 38 weeks gestation,   earlier if otherwise clinically indicated  [place] syndrome is a mitochondrial myopathy  Clinical   characteristics include chronic progressive external ophthalmoplegia with   pigmentary retinopathy and onset before age 21  Other abnormalities which   have been described include short stature, cerebellar ataxia, increased   CSF protein, cardiac conduction defects, and anemia, diabetes, deafness,   cognitive deficits or mental retardation  Death often occurs by the fourth   decade  Inheritance of [place]ady-Amber syndrome can be sporadic, maternal,   autosomal dominant, or autosomal recessive, reflecting that both defects   of mitochondrial and nuclear DNA can cause identical phenotypes  The face to face time, in addition to time spent discussing ultrasound   results, was approximately 15 minutes, greater than 50% of which was spent   during counseling and coordination of care  HILARIO Chou M D     Maternal-Fetal Medicine   Electronically signed 01/13/17 14:57

## 2018-01-17 NOTE — PROGRESS NOTES
2017         RE: Meagan oCronado                                  To: [facility]   MR#: [id number]                                    [address] [facility]   : [facility], [address] [place] [address]   ENC: [id number]                             Fax: [phone]   (Exam #: [id number])      The LMP of this 31year old,  G7, P2-0-4-2 patient was OCT 17 2016, giving   her an NITIN of 2017 and a current gestational age of 27 weeks 3 days   by dates  A sonographic examination was performed on 2017 using   real time equipment  The ultrasound examination was performed using   abdominal technique  The patient has a BMI of 33 1  Her blood pressure   today was 133/65  Earliest ultrasound found in her record: 16  8w6d  NITIN 17      Fetus A   Cardiac motion was observed at 138 bpm       Fetus B   Cardiac motion was observed at 155 bpm       INDICATIONS      multiple gestation  diamniotic dichorionic twins      Exam Types      amniotic fluid evaluation   NST      RESULTS      Fetus # 1 of 2   Vertex presentation   Fetal position = Inferior, Maternal Right   Placenta Location = Anterior   Placenta Grade = II   Chorionicity = Dichorionic, Diamniotic      Fetus # 2 of 2   Vertex presentation   Fetal position = Superior, Maternal Left   Placenta Location = Posterior   Placenta Grade = II   Chorionicity = Dichorionic, Diamniotic      Fetus A   The NST was reactive with no decelerations  Fetus B   The NST was reactive with no decelerations  AMNIOTIC FLUID      Fetus A      Largest Vertical Pocket = 4 5 cm   Amniotic Fluid: Normal         Fetus B      Largest Vertical Pocket = 3 9 cm   Amniotic Fluid: Normal      BIOPHYSICAL PROFILE      Fetus A   The Biophysical Profile score was 10/10  Breathin  Movement: 2  Tone: 2  AFV: 2  NST: 2   The NST was reactive with no decelerations  Fetus B   The Biophysical Profile score was 10/10  Breathin  Movement: 2  Tone: 2  AFV: 2  NST: 2   The NST was reactive with no decelerations  IMPRESSION      Twin IUP (Fetus A)   Vertex presentation   Regular fetal heart rate of 138 bpm   Anterior placenta   Fetal position = Inferior, Right   Dichorionic, diamniotic      Twin IUP (Fetus B)   Vertex presentation   Regular fetal heart rate of 155 bpm   Posterior placenta   Fetal position = Superior, Left   Dichorionic, diamniotic      RECOMMENDATION      MARCI: 1 Week   NST: 1 Week      HILARIO Felix M D     Maternal-Fetal Medicine   Electronically signed 17 13:11

## 2018-01-22 VITALS — DIASTOLIC BLOOD PRESSURE: 70 MMHG | SYSTOLIC BLOOD PRESSURE: 102 MMHG | BODY MASS INDEX: 32.39 KG/M2 | WEIGHT: 213 LBS

## 2018-01-22 VITALS — SYSTOLIC BLOOD PRESSURE: 112 MMHG | DIASTOLIC BLOOD PRESSURE: 72 MMHG | WEIGHT: 195 LBS | BODY MASS INDEX: 29.65 KG/M2

## 2018-01-22 VITALS — BODY MASS INDEX: 26 KG/M2 | WEIGHT: 171 LBS | DIASTOLIC BLOOD PRESSURE: 70 MMHG | SYSTOLIC BLOOD PRESSURE: 110 MMHG

## 2018-01-22 VITALS — WEIGHT: 213 LBS | DIASTOLIC BLOOD PRESSURE: 70 MMHG | SYSTOLIC BLOOD PRESSURE: 112 MMHG | BODY MASS INDEX: 32.39 KG/M2

## 2018-01-22 VITALS — WEIGHT: 216 LBS | BODY MASS INDEX: 32.84 KG/M2 | SYSTOLIC BLOOD PRESSURE: 104 MMHG | DIASTOLIC BLOOD PRESSURE: 62 MMHG

## 2018-01-22 VITALS — DIASTOLIC BLOOD PRESSURE: 78 MMHG | SYSTOLIC BLOOD PRESSURE: 102 MMHG | BODY MASS INDEX: 33.75 KG/M2 | WEIGHT: 222 LBS

## 2018-01-22 VITALS — DIASTOLIC BLOOD PRESSURE: 72 MMHG | SYSTOLIC BLOOD PRESSURE: 108 MMHG | WEIGHT: 184 LBS | BODY MASS INDEX: 27.98 KG/M2

## 2018-01-23 VITALS
WEIGHT: 173 LBS | DIASTOLIC BLOOD PRESSURE: 68 MMHG | BODY MASS INDEX: 26.22 KG/M2 | SYSTOLIC BLOOD PRESSURE: 104 MMHG | HEIGHT: 68 IN

## 2018-03-07 NOTE — PROGRESS NOTES
Education  [employer] Education 1st Trimester:   First Trimester Education provided: benefits of breastfeeding, importance of exclusive breastfeeding, early initiation of breastfeeding and exclusive breastfeeding for the first 6 months   The patient is planning on breastfeeding  Individualized education given: R/keyla pumping  Prenatal education provided by: Elmer Whitfield PA-C Date: 12/13/16  Signatures   Electronically signed by :  Elmer Whitfield, [facility]; Dec 13 2016  2:32PM EST                       (Author)

## 2018-03-07 NOTE — PROGRESS NOTES
Education  [employer] Education 3rd Trimester: Third Trimester Education provided:   benefits of breastfeeding, importance of exclusive breastfeeding, early initiation of breastfeeding, exclusive breastfeeding for the first 6 months, frequent feedings for optimal milk production, feeding on demand/baby-led feedings, effective positioning and attachment, importance of breastfeeding after 6 months following introduction of other foods, non-pharmacologic pain relief methods for labor, importance of early skin-to-skin contact and 28 week packet given  rooming-in on 24 hour basis Pregnancy Essentials Reference Guide given   The patient is planning on breastfeeding  The patient is planning on exclusively breastfeeding until the baby is 10months of age  Mother has not registered for prenatal class  Thought has been given to selecting a pediatrician  The mother has discussed personal preferences with her provider     Prenatal education provided by: Rohini Aldana [facility]      Signatures   Electronically signed by : Ana Maria Macedo [facility]; May  9 2017  7:16PM EST                       (Author)

## 2018-06-22 ENCOUNTER — TELEPHONE (OUTPATIENT)
Dept: OBGYN CLINIC | Facility: CLINIC | Age: 38
End: 2018-06-22

## 2018-06-26 ENCOUNTER — OFFICE VISIT (OUTPATIENT)
Dept: OBGYN CLINIC | Facility: CLINIC | Age: 38
End: 2018-06-26
Payer: COMMERCIAL

## 2018-06-26 VITALS
SYSTOLIC BLOOD PRESSURE: 102 MMHG | BODY MASS INDEX: 23.34 KG/M2 | WEIGHT: 154 LBS | DIASTOLIC BLOOD PRESSURE: 68 MMHG | HEIGHT: 68 IN

## 2018-06-26 DIAGNOSIS — N89.8 VAGINAL DISCHARGE: ICD-10-CM

## 2018-06-26 DIAGNOSIS — R10.2 PELVIC PAIN: Primary | ICD-10-CM

## 2018-06-26 PROBLEM — N63.20 LEFT BREAST LUMP: Status: ACTIVE | Noted: 2017-12-13

## 2018-06-26 PROCEDURE — 99213 OFFICE O/P EST LOW 20 MIN: CPT | Performed by: NURSE PRACTITIONER

## 2018-06-26 NOTE — PROGRESS NOTES
Assessment/Plan:    Vaginal discharge  Genital culture done today to assess for pelvic pain and discharge  Will treat based on results  Acidophilous daily  Pelvic pain  Normal exam today  Rx for pelvic ultrasound to assess for pelvic pain  RTO for annual exam         Diagnoses and all orders for this visit:    Pelvic pain  -     US pelvis complete w transvaginal; Future  -     GP Culture, Genital    Vaginal discharge  -     GP Culture, Genital          Subjective:      Patient ID: Brett Powers is a 30 y o  female  Pt complaining of intermittent pelvic pain x 2 - 3 months  Describes as sharp/ dull/ crampy, sometimes wraps around her back  Has not been able to pinpoint any time of the month or anything that makes pain occur  Is not related to menses or ovulation  Occurs every 2-3 days and pain will last a day  Pain located in mid pelvic area  Denies anything that makes pain better, has not taken anything for the pain  Does not notice anything that makes it worse  Denies any abnormal vaginal discharge, itching, or odor  Denies any dysuria, urgency, frequency, or hesitency  Denies any bowel problems  Denies the need for STD testing  Denies any pain with intercourse  Menstrual History:   LMP: 6/5/17  Period Cycle (Days): 28  Period Duration (Days): 6  Period Pattern: Regular  Menstrual Flow: Moderate  Menstrual Control: Tampon, Maxi pad  Dysmenorrhea: None        The following portions of the patient's history were reviewed and updated as appropriate: allergies, current medications, past family history, past medical history, past social history, past surgical history and problem list     Review of Systems   Genitourinary: Positive for pelvic pain  All other systems reviewed and are negative          Objective:    /68 (BP Location: Left arm, Patient Position: Sitting, Cuff Size: Standard)   Ht 5' 8" (1 727 m)   Wt 69 9 kg (154 lb)   LMP 06/05/2018 (Approximate)   BMI 23 42 kg/m² Physical Exam   Constitutional: She is oriented to person, place, and time  She appears well-developed and well-nourished  HENT:   Head: Normocephalic and atraumatic  Neck: Normal range of motion  Neck supple  No thyromegaly present  Cardiovascular: Normal rate, regular rhythm and normal heart sounds  Pulmonary/Chest: Effort normal and breath sounds normal    Abdominal: Soft  Bowel sounds are normal  She exhibits no distension and no mass  There is no tenderness  There is no rebound and no guarding  Genitourinary: Vagina normal and uterus normal  No labial fusion  There is no rash, tenderness, lesion or injury on the right labia  There is no rash, tenderness, lesion or injury on the left labia  Cervix exhibits no motion tenderness, no discharge and no friability  Right adnexum displays no mass, no tenderness and no fullness  Left adnexum displays no mass, no tenderness and no fullness  Musculoskeletal: Normal range of motion  Neurological: She is alert and oriented to person, place, and time  Skin: Skin is warm and dry  Psychiatric: She has a normal mood and affect   Her behavior is normal  Judgment and thought content normal

## 2018-06-26 NOTE — ASSESSMENT & PLAN NOTE
Genital culture done today to assess for pelvic pain and discharge  Will treat based on results  Acidophilous daily

## 2018-06-29 LAB — SL AMB GENITAL CULTURE: NORMAL

## 2018-07-12 ENCOUNTER — HOSPITAL ENCOUNTER (OUTPATIENT)
Dept: ULTRASOUND IMAGING | Facility: HOSPITAL | Age: 38
Discharge: HOME | End: 2018-07-12
Payer: COMMERCIAL

## 2018-07-12 DIAGNOSIS — R10.2 PELVIC PAIN: ICD-10-CM

## 2018-07-12 PROCEDURE — 76856 US EXAM PELVIC COMPLETE: CPT

## 2018-07-12 PROCEDURE — 76830 TRANSVAGINAL US NON-OB: CPT

## 2018-07-23 NOTE — PROGRESS NOTES
MAY 31 2017         RE: Sanford Duran                                  To: [facility]   MR#: [id number]                                    [address]   : [facility], [facility] Parent: [id number]                             Fax: [phone]   (Exam #: [id number])      The LMP of this 31year old,  G7, P2-0-4-2 patient was OCT 17 2016, giving   her an NITIN of 2017 and a current gestational age of 26 weeks 2 days   by dates  A sonographic examination was performed on MAY 31 2017 using   real time equipment  The ultrasound examination was performed using   abdominal technique  The patient has a BMI of 32 2  Her blood pressure   today was 102/65  Earliest ultrasound found in her record: 16  8w6d  NITIN 17         Crystal has no complaints today  She reports regular fetal movement and   denies problems related to vaginal bleeding,  labor, or   hypertension  Screening for gestational diabetes on  revealed a   normal one-hour post Glucola value of 125 mg/dL  Fetus A   Cardiac motion was observed at 144 bpm       Fetus B   Cardiac motion was observed at 153 bpm       INDICATIONS      multiple gestation  diamniotic dichorionic twins      Exam Types      Level I   NST      RESULTS      Fetus # 1 of 2   Vertex presentation   Fetal growth appeared normal   Fetal position = Maternal Right   Placenta Location = Anterior   No placenta previa   Placenta Grade = II   Chorionicity = Dichorionic, Diamniotic      Fetus # 2 of 2   Vertex presentation   Fetal growth appeared normal   Fetal position = Superior, Maternal Left   Placenta Location = Posterior   No placenta previa   Placenta Grade = II   Chorionicity = Dichorionic, Diamniotic      Fetus A   The NST was reactive with no decelerations  Fetus B   The NST was reactive with no decelerations        MEASUREMENTS (* Included In Average GA)      FETUS A   AC 26 5 cm        30 weeks 4 days* (18%)   BPD              8 6 cm        34 weeks 6 days* (85%)   HC              30 5 cm        33 weeks 4 days* (54%)   Femur            6 5 cm        33 weeks 1 day * (64%)      HC/AC           1 15   FL/AC           0 24   FL/BPD          0 75   EFW (Ac/Fl/Hc)  1882 grams - 4 lbs 2 oz                 (49%)      Fetus A AVERAGE G  A  is 33 weeks 0 days +/- 18 days  FETUS B   AC              27 5 cm        31 weeks 4 days* (34%)   BPD              8 0 cm        32 weeks 1 day * (40%)   HC              30 4 cm        33 weeks 3 days* (52%)   Femur            6 1 cm        31 weeks 5 days* (41%)      Cerebellum       4 4 cm        35 weeks 1 day      HC/AC           1 11   FL/AC           0 22   FL/BPD          0 77   EFW (Ac/Fl/Hc)  1880 grams - 4 lbs 2 oz                 (48%)      Fetus B AVERAGE G  A  is 32 weeks 1 day +/- 18 days  AMNIOTIC FLUID      Fetus A      Largest Vertical Pocket = 7 0 cm   Amniotic Fluid: Normal         Fetus B      Largest Vertical Pocket = 6 1 cm   Amniotic Fluid: Normal      ANATOMY DETAILS      Fetus A   Visualized Appearing Sonographically Normal:   HEAD: (Calvarium, BPD Level, Lateral Ventricles);    TH  CAV :   (Diaphragm);     HEART: (Four Chamber View, Proximal Left Outflow, Proximal   Right Outflow, 3 Vessel Trachea, IVC, SVC, Cardiac Axis, Cardiac   Position);    STOMACH, RIGHT KIDNEY, LEFT KIDNEY, BLADDER, PLACENTA      Not Visualized:   HEAD: (Cavum, Cerebellum)      Fetus B   Visualized Appearing Sonographically Normal:   HEAD: (Calvarium, BPD Level, Cavum, Lateral Ventricles, Choroid Plexus,   Cerebellum, Cisterna Magna);    TH  CAV : (Diaphragm);    STOMACH, RIGHT   KIDNEY, LEFT KIDNEY, BLADDER, PLACENTA      Not Visualized:   HEART: (Four Chamber View, Proximal Left Outflow, Proximal Right Outflow)      IMPRESSION      Twin IUP (Fetus A)   33 weeks and 0 days by this ultrasound  (NITIN=JUL 19 2017)   Vertex presentation   Fetal growth appeared normal   Regular fetal heart rate of 144 bpm   Anterior placenta   No placenta previa   Fetal position = Maternal , Right   Dichorionic, diamniotic      Twin IUP (Fetus B)   32 weeks and 1 day by this ultrasound  (NITIN=JUL 25 2017)   Vertex presentation   Fetal growth appeared normal   Regular fetal heart rate of 153 bpm   Posterior placenta   No placenta previa   Fetal position = Superior, Left   Dichorionic, diamniotic      GENERAL COMMENT      No fetal structural abnormalities are identified on the Level I surveys   today, limited in detail secondary to the constraints related to the late   gestational age  Fetal interval growth and amniotic fluid volumes are   normal       Today's ultrasound findings and suggested follow-up were discussed in   detail with Crystal  Her gestational diabetes screening results were   discussed  Continuation of weekly non stress testing and amniotic fluid   volume assessments is recommended  Repeat fetal growth assessments will be   performed in 4 weeks  Delivery is recommended at 38 weeks gestation,   sooner if otherwise clinically indicated  The face to face time, in addition to time spent discussing ultrasound   results, was approximately 10 minutes, greater than 50% of which was spent   during counseling and coordination of care  HILARIO Landa M D     Maternal-Fetal Medicine   Electronically signed 05/31/17 09:01 No

## 2019-03-30 ENCOUNTER — HOSPITAL ENCOUNTER (EMERGENCY)
Facility: HOSPITAL | Age: 39
Discharge: HOME | End: 2019-03-31
Attending: EMERGENCY MEDICINE | Admitting: EMERGENCY MEDICINE
Payer: COMMERCIAL

## 2019-03-30 DIAGNOSIS — J18.9 PNEUMONIA: Primary | ICD-10-CM

## 2019-03-30 PROCEDURE — 99284 EMERGENCY DEPT VISIT MOD MDM: CPT

## 2019-03-30 RX ORDER — MULTIVIT-MIN/IRON/FOLIC ACID/K 18-600-40
2000 CAPSULE ORAL DAILY
COMMUNITY
End: 2019-09-03 | Stop reason: SDUPTHER

## 2019-03-30 RX ORDER — KETOROLAC TROMETHAMINE 30 MG/ML
15 INJECTION, SOLUTION INTRAMUSCULAR; INTRAVENOUS ONCE
Status: COMPLETED | OUTPATIENT
Start: 2019-03-31 | End: 2019-03-31

## 2019-03-31 ENCOUNTER — APPOINTMENT (EMERGENCY)
Dept: RADIOLOGY | Facility: HOSPITAL | Age: 39
End: 2019-03-31
Payer: COMMERCIAL

## 2019-03-31 VITALS
OXYGEN SATURATION: 98 % | TEMPERATURE: 100.8 F | RESPIRATION RATE: 16 BRPM | WEIGHT: 167.77 LBS | SYSTOLIC BLOOD PRESSURE: 105 MMHG | HEART RATE: 64 BPM | DIASTOLIC BLOOD PRESSURE: 58 MMHG | BODY MASS INDEX: 25.51 KG/M2

## 2019-03-31 LAB
ANION GAP SERPL CALCULATED.3IONS-SCNC: 10 MMOL/L (ref 4–13)
BASOPHILS # BLD AUTO: 0.02 THOUSANDS/ΜL (ref 0–0.1)
BASOPHILS NFR BLD AUTO: 0 % (ref 0–1)
BUN SERPL-MCNC: 21 MG/DL (ref 5–25)
CALCIUM SERPL-MCNC: 8.7 MG/DL (ref 8.3–10.1)
CHLORIDE SERPL-SCNC: 101 MMOL/L (ref 100–108)
CO2 SERPL-SCNC: 24 MMOL/L (ref 21–32)
CREAT SERPL-MCNC: 0.97 MG/DL (ref 0.6–1.3)
EOSINOPHIL # BLD AUTO: 0.03 THOUSAND/ΜL (ref 0–0.61)
EOSINOPHIL NFR BLD AUTO: 0 % (ref 0–6)
ERYTHROCYTE [DISTWIDTH] IN BLOOD BY AUTOMATED COUNT: 11.7 % (ref 11.6–15.1)
GFR SERPL CREATININE-BSD FRML MDRD: 75 ML/MIN/1.73SQ M
GLUCOSE SERPL-MCNC: 124 MG/DL (ref 65–140)
HCT VFR BLD AUTO: 35.9 % (ref 34.8–46.1)
HGB BLD-MCNC: 12.5 G/DL (ref 11.5–15.4)
IMM GRANULOCYTES # BLD AUTO: 0.07 THOUSAND/UL (ref 0–0.2)
IMM GRANULOCYTES NFR BLD AUTO: 1 % (ref 0–2)
LYMPHOCYTES # BLD AUTO: 1.01 THOUSANDS/ΜL (ref 0.6–4.47)
LYMPHOCYTES NFR BLD AUTO: 8 % (ref 14–44)
MCH RBC QN AUTO: 32.2 PG (ref 26.8–34.3)
MCHC RBC AUTO-ENTMCNC: 34.8 G/DL (ref 31.4–37.4)
MCV RBC AUTO: 93 FL (ref 82–98)
MONOCYTES # BLD AUTO: 0.75 THOUSAND/ΜL (ref 0.17–1.22)
MONOCYTES NFR BLD AUTO: 6 % (ref 4–12)
NEUTROPHILS # BLD AUTO: 10.53 THOUSANDS/ΜL (ref 1.85–7.62)
NEUTS SEG NFR BLD AUTO: 85 % (ref 43–75)
NRBC BLD AUTO-RTO: 0 /100 WBCS
PLATELET # BLD AUTO: 179 THOUSANDS/UL (ref 149–390)
PMV BLD AUTO: 10.1 FL (ref 8.9–12.7)
POTASSIUM SERPL-SCNC: 3.6 MMOL/L (ref 3.5–5.3)
RBC # BLD AUTO: 3.88 MILLION/UL (ref 3.81–5.12)
SODIUM SERPL-SCNC: 135 MMOL/L (ref 136–145)
WBC # BLD AUTO: 12.41 THOUSAND/UL (ref 4.31–10.16)

## 2019-03-31 PROCEDURE — 96361 HYDRATE IV INFUSION ADD-ON: CPT

## 2019-03-31 PROCEDURE — 36415 COLL VENOUS BLD VENIPUNCTURE: CPT | Performed by: PHYSICIAN ASSISTANT

## 2019-03-31 PROCEDURE — 80048 BASIC METABOLIC PNL TOTAL CA: CPT | Performed by: PHYSICIAN ASSISTANT

## 2019-03-31 PROCEDURE — 71046 X-RAY EXAM CHEST 2 VIEWS: CPT

## 2019-03-31 PROCEDURE — 96374 THER/PROPH/DIAG INJ IV PUSH: CPT

## 2019-03-31 PROCEDURE — 85025 COMPLETE CBC W/AUTO DIFF WBC: CPT | Performed by: PHYSICIAN ASSISTANT

## 2019-03-31 RX ORDER — LEVOFLOXACIN 500 MG/1
500 TABLET, FILM COATED ORAL ONCE
Status: COMPLETED | OUTPATIENT
Start: 2019-03-31 | End: 2019-03-31

## 2019-03-31 RX ORDER — LEVOFLOXACIN 500 MG/1
500 TABLET, FILM COATED ORAL EVERY 24 HOURS
Qty: 4 TABLET | Refills: 0 | Status: SHIPPED | OUTPATIENT
Start: 2019-03-31 | End: 2019-04-05

## 2019-03-31 RX ADMIN — SODIUM CHLORIDE 1000 ML: 0.9 INJECTION, SOLUTION INTRAVENOUS at 00:44

## 2019-03-31 RX ADMIN — LEVOFLOXACIN 500 MG: 500 TABLET, FILM COATED ORAL at 01:40

## 2019-03-31 RX ADMIN — KETOROLAC TROMETHAMINE 15 MG: 30 INJECTION, SOLUTION INTRAMUSCULAR; INTRAVENOUS at 00:58

## 2019-04-05 ENCOUNTER — ANNUAL EXAM (OUTPATIENT)
Dept: OBGYN CLINIC | Facility: CLINIC | Age: 39
End: 2019-04-05
Payer: COMMERCIAL

## 2019-04-05 VITALS
WEIGHT: 160 LBS | BODY MASS INDEX: 24.25 KG/M2 | HEIGHT: 68 IN | DIASTOLIC BLOOD PRESSURE: 70 MMHG | SYSTOLIC BLOOD PRESSURE: 118 MMHG

## 2019-04-05 DIAGNOSIS — N92.0 MENORRHAGIA WITH REGULAR CYCLE: ICD-10-CM

## 2019-04-05 DIAGNOSIS — Z01.419 ENCOUNTER FOR GYNECOLOGICAL EXAMINATION WITHOUT ABNORMAL FINDING: Primary | ICD-10-CM

## 2019-04-05 PROCEDURE — S0612 ANNUAL GYNECOLOGICAL EXAMINA: HCPCS | Performed by: PHYSICIAN ASSISTANT

## 2019-04-13 LAB — THIN PREP CVX: NORMAL

## 2019-09-03 PROBLEM — J34.89 NASAL OBSTRUCTION: Status: ACTIVE | Noted: 2019-09-03

## 2019-09-03 PROBLEM — J34.3 NASAL TURBINATE HYPERTROPHY: Status: ACTIVE | Noted: 2019-09-03

## 2019-09-03 PROBLEM — M26.609 TMJ (TEMPOROMANDIBULAR JOINT SYNDROME): Status: ACTIVE | Noted: 2019-09-03

## 2019-09-03 PROBLEM — J34.2 NASAL SEPTAL DEVIATION: Status: ACTIVE | Noted: 2019-09-03

## 2020-05-05 ENCOUNTER — TELEPHONE (OUTPATIENT)
Dept: OBGYN CLINIC | Facility: CLINIC | Age: 40
End: 2020-05-05

## 2020-06-15 ENCOUNTER — ANNUAL EXAM (OUTPATIENT)
Dept: OBGYN CLINIC | Facility: CLINIC | Age: 40
End: 2020-06-15
Payer: COMMERCIAL

## 2020-06-15 VITALS
SYSTOLIC BLOOD PRESSURE: 110 MMHG | WEIGHT: 162 LBS | DIASTOLIC BLOOD PRESSURE: 70 MMHG | TEMPERATURE: 98 F | BODY MASS INDEX: 24.55 KG/M2 | HEIGHT: 68 IN

## 2020-06-15 DIAGNOSIS — Z01.419 WELL WOMAN EXAM: Primary | ICD-10-CM

## 2020-06-15 DIAGNOSIS — N64.4 BREAST TENDERNESS: ICD-10-CM

## 2020-06-15 PROBLEM — M26.609 TMJ (TEMPOROMANDIBULAR JOINT SYNDROME): Status: RESOLVED | Noted: 2019-09-03 | Resolved: 2020-06-15

## 2020-06-15 PROBLEM — Z3A.36 36 WEEKS GESTATION OF PREGNANCY: Status: RESOLVED | Noted: 2017-06-26 | Resolved: 2020-06-15

## 2020-06-15 PROBLEM — N89.8 VAGINAL DISCHARGE: Status: RESOLVED | Noted: 2018-06-26 | Resolved: 2020-06-15

## 2020-06-15 PROBLEM — R10.2 PELVIC PAIN IN FEMALE: Status: RESOLVED | Noted: 2017-05-09 | Resolved: 2020-06-15

## 2020-06-15 PROBLEM — R10.2 PELVIC PAIN: Status: RESOLVED | Noted: 2018-06-26 | Resolved: 2020-06-15

## 2020-06-15 PROCEDURE — S0612 ANNUAL GYNECOLOGICAL EXAMINA: HCPCS | Performed by: PHYSICIAN ASSISTANT

## 2020-08-12 ENCOUNTER — TRANSCRIBE ORDERS (OUTPATIENT)
Dept: ADMINISTRATIVE | Facility: HOSPITAL | Age: 40
End: 2020-08-12

## 2020-08-12 DIAGNOSIS — K21.9 GASTROESOPHAGEAL REFLUX DISEASE, ESOPHAGITIS PRESENCE NOT SPECIFIED: Primary | ICD-10-CM

## 2020-08-14 ENCOUNTER — HOSPITAL ENCOUNTER (OUTPATIENT)
Dept: RADIOLOGY | Facility: HOSPITAL | Age: 40
Discharge: HOME | End: 2020-08-14
Payer: COMMERCIAL

## 2020-08-14 DIAGNOSIS — K21.9 GASTROESOPHAGEAL REFLUX DISEASE, ESOPHAGITIS PRESENCE NOT SPECIFIED: ICD-10-CM

## 2020-08-14 PROCEDURE — 74240 X-RAY XM UPR GI TRC 1CNTRST: CPT

## 2020-08-31 ENCOUNTER — TELEPHONE (OUTPATIENT)
Dept: OBGYN CLINIC | Facility: CLINIC | Age: 40
End: 2020-08-31

## 2020-09-11 ENCOUNTER — INITIAL PRENATAL (OUTPATIENT)
Dept: OBGYN CLINIC | Facility: CLINIC | Age: 40
End: 2020-09-11

## 2020-09-11 VITALS
SYSTOLIC BLOOD PRESSURE: 112 MMHG | TEMPERATURE: 97.8 F | DIASTOLIC BLOOD PRESSURE: 74 MMHG | BODY MASS INDEX: 25.16 KG/M2 | WEIGHT: 166 LBS | HEIGHT: 68 IN

## 2020-09-11 DIAGNOSIS — Z11.3 SCREENING EXAMINATION FOR VENEREAL DISEASE: ICD-10-CM

## 2020-09-11 DIAGNOSIS — Z34.81 PRENATAL CARE, SUBSEQUENT PREGNANCY, FIRST TRIMESTER: Primary | ICD-10-CM

## 2020-09-11 PROCEDURE — PNV: Performed by: PHYSICIAN ASSISTANT

## 2020-09-11 NOTE — PROGRESS NOTES
NOB: Patient reports had some brown spotting this am, cramping in lower back  Reports nausea, no vomiting  Reviewed small frequent meals, vitamin B6, anton  Occ headaches, reviewed tylenol, rest, hydration  No FM, LOF, edema, domestic violence, or smoking  Tolerating PNV  Vaginal exam: scant amount of brown discharge at cervical os  TVUS: [place] IUP at 5w6d based on CRL of 0 28cm  unable to see FHT  (+)yolk sac  Gestational sac measuring 6w2d  Reviewed with patient S<D by almost 2 weeks  Patient unsure of exact LMP  Took positive pregnancy test 2 weeks ago   had vasectomy 3 years ago, never had follow up check  Reviewed with patient possible missed  vs early IUP  Will plan to get progesterone, baseline quant, type and screen  Per chart patient is AB positive blood type  Offered support, reviewed if bleeding does start what to expect and when to call  Patient to call office if bleeding picks up  Otherwise follow up in 1-2 weeks for repeat ultrasound to evaluate viability

## 2020-09-14 LAB
ABO GROUP BLD: NORMAL
B-HCG SERPL-ACNC: ABNORMAL MIU/ML
BLD GP AB SCN SERPL QL: NORMAL
PROGEST SERPL-MCNC: 8.3 NG/ML
RH BLD: NORMAL

## 2020-09-16 ENCOUNTER — TELEPHONE (OUTPATIENT)
Dept: OBGYN CLINIC | Facility: CLINIC | Age: 40
End: 2020-09-16

## 2020-09-16 NOTE — TELEPHONE ENCOUNTER
----- Message from Wendy Rooney PA-C sent at 9/16/2020  5:12 PM EDT -----  Please call patient tomorrow to see if her bleeding picked up and if she is still coming for her viability scan at 2pm on Thursday

## 2020-09-17 ENCOUNTER — ROUTINE PRENATAL (OUTPATIENT)
Dept: OBGYN CLINIC | Facility: CLINIC | Age: 40
End: 2020-09-17

## 2020-09-17 VITALS
TEMPERATURE: 97.7 F | WEIGHT: 171 LBS | BODY MASS INDEX: 25.91 KG/M2 | DIASTOLIC BLOOD PRESSURE: 70 MMHG | SYSTOLIC BLOOD PRESSURE: 120 MMHG | HEIGHT: 68 IN

## 2020-09-17 DIAGNOSIS — O02.1 MISSED ABORTION: Primary | ICD-10-CM

## 2020-09-17 DIAGNOSIS — Z34.81 PRENATAL CARE, SUBSEQUENT PREGNANCY, FIRST TRIMESTER: ICD-10-CM

## 2020-09-17 PROCEDURE — PNV: Performed by: PHYSICIAN ASSISTANT

## 2020-09-17 RX ORDER — MISOPROSTOL 200 UG/1
800 TABLET ORAL ONCE
Qty: 4 TABLET | Refills: 0 | Status: SHIPPED | OUTPATIENT
Start: 2020-09-17 | End: 2021-07-27

## 2020-09-17 NOTE — PROGRESS NOTES
Viability scan:   Patient was seen on 9/11/20 for new OB appointment  At that time was found to be measuring 2 weeks behind based on LMP  Patient had also had a small amount of brown spotting that day  Over the course at this past week patient has had more bleeding reports started picking up even more today and having more cramping  Also reports some headaches  Has been taking Tylenol as needed  Patient is AB positive blood type  Blood work done on 9/11/2020 showed quant of 34,176 and progesterone of 8 3  No FM, N/V, LOF, edema, domestic violence, or smoking  Tolerating PNV  TVUS: [place] IUP at THE [facility] based on CRL of 0 51cm  No fetal heart tones  Some hemorrhaging seen around the sac  S<D by 3 weeks  Reviewed options with patient including repeating ultrasound in another week which would be 2 weeks after her first ultrasound to diagnostically confirm loss  Patient was considering cytotec  Called patient after appointment and reviewed diagnostic criteria, patient admitted to not knowing exact LMP, may have been the last week of July which would put her around 7 weeks now  At this time has decided not to take cytotec and will plan to wait and see if her bleeding picks up  If bleeding does not  in the next week will plan for another ultrasound 10-14 days from last ultrasound to officially confirm loss prior to taking Cytotec  Would also like to make an appointment to discuss tubal ligation after this pregnancy resolves

## 2020-09-18 LAB — B-HCG SERPL-ACNC: NORMAL MIU/ML

## 2020-09-19 ENCOUNTER — NURSE TRIAGE (OUTPATIENT)
Dept: OTHER | Facility: OTHER | Age: 40
End: 2020-09-19

## 2020-09-19 ENCOUNTER — HOSPITAL ENCOUNTER (EMERGENCY)
Facility: HOSPITAL | Age: 40
Discharge: HOME | End: 2020-09-19
Attending: EMERGENCY MEDICINE | Admitting: EMERGENCY MEDICINE
Payer: COMMERCIAL

## 2020-09-19 VITALS
HEART RATE: 60 BPM | TEMPERATURE: 98.5 F | RESPIRATION RATE: 18 BRPM | WEIGHT: 173.72 LBS | BODY MASS INDEX: 26.41 KG/M2 | DIASTOLIC BLOOD PRESSURE: 55 MMHG | SYSTOLIC BLOOD PRESSURE: 99 MMHG | OXYGEN SATURATION: 99 %

## 2020-09-19 DIAGNOSIS — O03.9 SPONTANEOUS MISCARRIAGE: Primary | ICD-10-CM

## 2020-09-19 LAB
ANION GAP SERPL CALCULATED.3IONS-SCNC: 9 MMOL/L (ref 4–13)
B-HCG SERPL-ACNC: ABNORMAL MIU/ML
BASOPHILS # BLD AUTO: 0.02 THOUSANDS/ΜL (ref 0–0.1)
BASOPHILS NFR BLD AUTO: 0 % (ref 0–1)
BUN SERPL-MCNC: 17 MG/DL (ref 5–25)
CALCIUM SERPL-MCNC: 8.9 MG/DL (ref 8.3–10.1)
CHLORIDE SERPL-SCNC: 105 MMOL/L (ref 100–108)
CO2 SERPL-SCNC: 28 MMOL/L (ref 21–32)
CREAT SERPL-MCNC: 0.94 MG/DL (ref 0.6–1.3)
EOSINOPHIL # BLD AUTO: 0.05 THOUSAND/ΜL (ref 0–0.61)
EOSINOPHIL NFR BLD AUTO: 1 % (ref 0–6)
ERYTHROCYTE [DISTWIDTH] IN BLOOD BY AUTOMATED COUNT: 11.4 % (ref 11.6–15.1)
GFR SERPL CREATININE-BSD FRML MDRD: 78 ML/MIN/1.73SQ M
GLUCOSE SERPL-MCNC: 133 MG/DL (ref 65–140)
HCT VFR BLD AUTO: 36.5 % (ref 34.8–46.1)
HGB BLD-MCNC: 12.1 G/DL (ref 11.5–15.4)
IMM GRANULOCYTES # BLD AUTO: 0.02 THOUSAND/UL (ref 0–0.2)
IMM GRANULOCYTES NFR BLD AUTO: 0 % (ref 0–2)
LYMPHOCYTES # BLD AUTO: 2.15 THOUSANDS/ΜL (ref 0.6–4.47)
LYMPHOCYTES NFR BLD AUTO: 29 % (ref 14–44)
MCH RBC QN AUTO: 31.5 PG (ref 26.8–34.3)
MCHC RBC AUTO-ENTMCNC: 33.2 G/DL (ref 31.4–37.4)
MCV RBC AUTO: 95 FL (ref 82–98)
MONOCYTES # BLD AUTO: 0.46 THOUSAND/ΜL (ref 0.17–1.22)
MONOCYTES NFR BLD AUTO: 6 % (ref 4–12)
NEUTROPHILS # BLD AUTO: 4.85 THOUSANDS/ΜL (ref 1.85–7.62)
NEUTS SEG NFR BLD AUTO: 64 % (ref 43–75)
NRBC BLD AUTO-RTO: 0 /100 WBCS
PLATELET # BLD AUTO: 235 THOUSANDS/UL (ref 149–390)
PMV BLD AUTO: 9.6 FL (ref 8.9–12.7)
POTASSIUM SERPL-SCNC: 3.8 MMOL/L (ref 3.5–5.3)
RBC # BLD AUTO: 3.84 MILLION/UL (ref 3.81–5.12)
SODIUM SERPL-SCNC: 142 MMOL/L (ref 136–145)
WBC # BLD AUTO: 7.55 THOUSAND/UL (ref 4.31–10.16)

## 2020-09-19 PROCEDURE — 99284 EMERGENCY DEPT VISIT MOD MDM: CPT

## 2020-09-19 PROCEDURE — NC001 PR NO CHARGE: Performed by: OBSTETRICS & GYNECOLOGY

## 2020-09-19 PROCEDURE — 36415 COLL VENOUS BLD VENIPUNCTURE: CPT | Performed by: EMERGENCY MEDICINE

## 2020-09-19 PROCEDURE — 80048 BASIC METABOLIC PNL TOTAL CA: CPT | Performed by: EMERGENCY MEDICINE

## 2020-09-19 PROCEDURE — 85025 COMPLETE CBC W/AUTO DIFF WBC: CPT | Performed by: EMERGENCY MEDICINE

## 2020-09-19 PROCEDURE — 96374 THER/PROPH/DIAG INJ IV PUSH: CPT

## 2020-09-19 PROCEDURE — 84702 CHORIONIC GONADOTROPIN TEST: CPT | Performed by: EMERGENCY MEDICINE

## 2020-09-19 PROCEDURE — 99285 EMERGENCY DEPT VISIT HI MDM: CPT | Performed by: EMERGENCY MEDICINE

## 2020-09-19 RX ORDER — MISOPROSTOL 200 UG/1
800 TABLET ORAL ONCE
Status: COMPLETED | OUTPATIENT
Start: 2020-09-19 | End: 2020-09-19

## 2020-09-19 RX ORDER — HYDROMORPHONE HCL/PF 1 MG/ML
0.5 SYRINGE (ML) INJECTION ONCE
Status: COMPLETED | OUTPATIENT
Start: 2020-09-19 | End: 2020-09-19

## 2020-09-19 RX ADMIN — MISOPROSTOL 800 MCG: 200 TABLET ORAL at 20:10

## 2020-09-19 RX ADMIN — HYDROMORPHONE HYDROCHLORIDE 0.5 MG: 1 INJECTION, SOLUTION INTRAMUSCULAR; INTRAVENOUS; SUBCUTANEOUS at 17:11

## 2020-09-19 NOTE — TELEPHONE ENCOUNTER
Per Dr Annita Story on call, patient has to go to [facility] ER  Patient agreeable, stated that she needs to coordinate  with her , will be in ER in abou 2 hours  Patient was advised to go to ER as soon as possible  Patient was reassured that she need someone to drive her to ER or to call 911

## 2020-09-19 NOTE — TELEPHONE ENCOUNTER
Reason for Disposition   [1] Constant abdominal pain AND [2] present > 2 hours    Answer Assessment - Initial Assessment Questions  1  DIAGNOSIS CONFIRMATION: "When was the threatened miscarriage (threatened ) diagnosed?" "By whom?"       Miscarriage confirmed by ultrasound on 20   2  ULTRASOUND: "Was an ultrasound performed at that time?"  If so, "Do you know what it showed?"      Yes   3  PREGNANCY: "Do you know how many weeks or months pregnant you are?" "When was the first day of your last normal menstrual period?"      6 weeks   4  MAIN CONCERN: "What is your main concern right now?" "What questions do you have?"      Increased vaginal bleeding with blood clots, vaginal and abdominal pain, back pain  5  VAGINAL BLEEDING: "Describe the bleeding that you are having " "How much bleeding is there?"     - SPOTTING: spotting or pinkish / brownish mucous discharge; 1-2 pads a day    - MILD:  less than 1 pad / hour; similar to mild menstrual bleeding    - MODERATE: 1-2 pads / hour; small-medium blood clots (e g , pea, grape, small coin)     - SEVERE: soaking 2 or more pads/hour; bleeding not contained by pads or continuous red blood from vagina; large blood clots (e g , golf ball, large coin)       Moderate with large blood clots , started 2 hours ago  6  ABDOMINAL PAIN: "Do you have any abdominal pain?"  If present, ask: "How bad is it?"  (e g , Scale 1-10; mild, moderate, or severe)    - MILD (1-3): doesn't interfere with normal activities, abdomen soft and not tender to touch     - MODERATE (4-7): interferes with normal activities or awakens from sleep, tender to touch     - SEVERE (8-10): excruciating pain, doubled over, unable to do any normal activities       Moderate, 6 out of 10   7  HEMODYNAMIC STATUS: "Are you weak or feeling lightheaded?" If so, ask: "Can you stand and walk normally?"        No, but having headaches   8   OTHER SYMPTOMS: "What other symptoms are you having with the bleeding?" (e g , passed tissue, vaginal discharge, fever, menstrual-type cramps, nausea, vomiting)      Passed tissue, pain in vagina and lower back      Protocols used:  - THREATENED MISCARRIAGE FOLLOW-UP CALL-ADULT-

## 2020-09-19 NOTE — ED PROVIDER NOTES
History  Chief Complaint   Patient presents with    Threatened Miscarriage     Patient is 10 weeks pregnant; cramping present and heavy vaginal bleeding  Soaking through a pad every 20 min  Also c/o headache  History provided by:  Patient   used: No    80-year-old female referred by OBGYN for evaluation of heavy vaginal bleeding today in the setting of a missed   Patient is 10 weeks by dates but fetus was measuring 6 weeks  She reports having an ultrasound 2 days ago in the office confirming a missed   States that she has been having pelvic cramping and increasing intensity of bleeding today  Reports passing golf ball-sized clots and needing to change a pad about every 20 minutes  Her vitals are normal here  She denies any lightheadedness, dizziness, nausea or vomiting  She was able to walk to the bathroom across the ED without difficulty  Otherwise healthy  Rh positive  Will discuss with OBGYN team regarding possible need for D&C  For now check labs and monitor  Prior to Admission Medications   Prescriptions Last Dose Informant Patient Reported? Taking?    Cholecalciferol (VITAMIN D) 2000 units CAPS   Yes No   Sig: Take by mouth   Prenatal Vit-DSS-Fe Fum-FA (PRENATAL 19) tablet   Yes No   Sig: Take by mouth   folic acid (FOLVITE) 733 MCG tablet   Yes No   Sig: Take by mouth   misoprostol (CYTOTEC) 200 mcg tablet   No No   Sig: Take 4 tablets (800 mcg total) by mouth once for 1 dose      Facility-Administered Medications: None       Past Medical History:   Diagnosis Date    Abnormal Pap smear of cervix     Ear problems     Heartburn during pregnancy     Migraine     Palpitations     Varicella        Past Surgical History:   Procedure Laterality Date    DENTAL SURGERY      DILATION AND CURETTAGE OF UTERUS      NASAL SEPTUM SURGERY         Family History   Problem Relation Age of Onset    Miscarriages / Stillbirths Mother     Birth defects Sister  Drug abuse Paternal Uncle     Arthritis Maternal Grandmother     Cancer Maternal Grandmother     Hyperlipidemia Maternal Grandmother     Hypertension Maternal Grandmother     Breast cancer Maternal Grandmother     Cancer Maternal Grandfather     Hearing loss Maternal Grandfather     Vision loss Maternal Grandfather     Cancer Paternal Grandmother     Diabetes Paternal Grandmother     Hearing loss Paternal Grandmother     Hyperlipidemia Paternal Grandmother     Hypertension Paternal Grandmother     Alcohol abuse Paternal Grandfather     Cancer Paternal Grandfather     Mental illness Paternal Grandfather     Lung cancer Paternal Grandfather     Colon cancer Maternal Aunt      I have reviewed and agree with the history as documented  E-Cigarette/Vaping     E-Cigarette/Vaping Substances     Social History     Tobacco Use    Smoking status: Never Smoker    Smokeless tobacco: Never Used   Substance Use Topics    Alcohol use: No    Drug use: No       Review of Systems   Constitutional: Negative for activity change, appetite change, fatigue and fever  Respiratory: Negative for chest tightness and shortness of breath  Gastrointestinal: Negative for nausea and vomiting  Genitourinary: Positive for pelvic pain and vaginal bleeding  Negative for difficulty urinating  Neurological: Negative for weakness  All other systems reviewed and are negative  Physical Exam  Physical Exam  Vitals signs and nursing note reviewed  Constitutional:       Appearance: Normal appearance  HENT:      Head: Normocephalic and atraumatic  Cardiovascular:      Rate and Rhythm: Normal rate and regular rhythm  Pulmonary:      Effort: Pulmonary effort is normal  No respiratory distress  Abdominal:      General: There is no distension  Palpations: Abdomen is soft  Tenderness: There is no abdominal tenderness  Musculoskeletal: Normal range of motion  Right lower leg: No edema        Left lower leg: No edema  Neurological:      General: No focal deficit present  Mental Status: She is alert and oriented to person, place, and time     Psychiatric:         Mood and Affect: Mood normal          Behavior: Behavior normal          Vital Signs  ED Triage Vitals   Temperature Pulse Respirations Blood Pressure SpO2   09/19/20 1646 09/19/20 1646 09/19/20 1646 09/19/20 1646 09/19/20 1646   98 5 °F (36 9 °C) 78 18 119/60 98 %      Temp Source Heart Rate Source Patient Position - Orthostatic VS BP Location FiO2 (%)   09/19/20 1646 09/19/20 1646 09/19/20 1646 09/19/20 1646 --   Oral Monitor Lying Right arm       Pain Score       09/19/20 1711       7           Vitals:    09/19/20 2000 09/19/20 2015 09/19/20 2045 09/19/20 2215   BP:    99/55   Pulse: 58 60 60 60   Patient Position - Orthostatic VS:    Lying         Visual Acuity      ED Medications  Medications   HYDROmorphone (DILAUDID) injection 0 5 mg (0 5 mg Intravenous Given 9/19/20 1711)   misoprostol (CYTOTEC) tablet 800 mcg (800 mcg Oral Given 9/19/20 2010)       Diagnostic Studies  Results Reviewed     Procedure Component Value Units Date/Time    Quantitative hCG [[id number]]  (Abnormal) Collected:  09/19/20 1709    Lab Status:  Final result Specimen:  Blood from Arm, Right Updated:  09/19/20 1936     HCG, Quant 14,297 mIU/mL     Narrative:        Expected Ranges:     Approximate               Approximate HCG  Gestation age          Concentration ( mIU/mL)  _____________          ______________________   [place] Forget                      HCG values  0 2-1                       5-50  1-2                           2-3                         100-5000  3-4                         500-44113  4-5                         1000-83118  5-6                         [id number]  6-8                         [id number]  8-12                        [id number]      Basic metabolic panel [[id number]] Collected:  09/19/20 1709    Lab Status:  Final result Specimen: Blood from Arm, Right Updated:  09/19/20 1727     Sodium 142 mmol/L      Potassium 3 8 mmol/L      Chloride 105 mmol/L      CO2 28 mmol/L      ANION GAP 9 mmol/L      BUN 17 mg/dL      Creatinine 0 94 mg/dL      Glucose 133 mg/dL      Calcium 8 9 mg/dL      eGFR 78 ml/min/1 73sq m     Narrative:       [place] guidelines for Chronic Kidney Disease (CKD):     Stage 1 with normal or high GFR (GFR > 90 mL/min/1 73 square meters)    Stage 2 Mild CKD (GFR = 60-89 mL/min/1 73 square meters)    Stage 3A Moderate CKD (GFR = 45-59 mL/min/1 73 square meters)    Stage 3B Moderate CKD (GFR = 30-44 mL/min/1 73 square meters)    Stage 4 Severe CKD (GFR = 15-29 mL/min/1 73 square meters)    Stage 5 End Stage CKD (GFR <15 mL/min/1 73 square meters)  Note: GFR calculation is accurate only with a steady state creatinine    CBC and differential [[id number]]  (Abnormal) Collected:  09/19/20 1709    Lab Status:  Final result Specimen:  Blood from Arm, Right Updated:  09/19/20 1719     WBC 7 55 Thousand/uL      RBC 3 84 Million/uL      Hemoglobin 12 1 g/dL      Hematocrit 36 5 %      MCV 95 fL      MCH 31 5 pg      MCHC 33 2 g/dL      RDW 11 4 %      MPV 9 6 fL      Platelets 975 Thousands/uL      nRBC 0 /100 WBCs      Neutrophils Relative 64 %      Immat GRANS % 0 %      Lymphocytes Relative 29 %      Monocytes Relative 6 %      Eosinophils Relative 1 %      Basophils Relative 0 %      Neutrophils Absolute 4 85 Thousands/µL      Immature Grans Absolute 0 02 Thousand/uL      Lymphocytes Absolute 2 15 Thousands/µL      Monocytes Absolute 0 46 Thousand/µL      Eosinophils Absolute 0 05 Thousand/µL      Basophils Absolute 0 02 Thousands/µL                  No orders to display              Procedures  Procedures         ED Course  ED Course as of Sep 20 1048   Sat Sep 19, 2020   1757 Patient reports feeling okay  Bleeding has somewhat slowed down  Feels like she needs to change her pad    Last change was about an hour ago   Discussed case with OBGYN  Resident was able to remove large clots and fetal tissue from vaginal wall during speculum exam   Requested 800mcg cytotec and observe patient for 2 hours   Patient reports feeling well  Amount vaginal bleeding significantly decreased from oral to the ED  Stable discharge home  Vitals remained normal                                           MDM  Number of Diagnoses or Management Options  Spontaneous miscarriage: new and requires workup  Diagnosis management comments: 41 y/o female at 10 weeks by dates with missed  diagnosed 2 days ago on ultrasound presented with significant vaginal bleeding  Pain controlled  Vitals remained normal  No anemia  Examined by OB with removal of large clots and fetal tissue  Given cytotec per OB and monitored without significant recurrence of vaginal bleeding  Stable for d/c home and office f/u  Agrees to return with any worsening  Amount and/or Complexity of Data Reviewed  Clinical lab tests: ordered and reviewed  Discuss the patient with other providers: yes    Patient Progress  Patient progress: improved      Disposition  Final diagnoses:   Spontaneous miscarriage     Time reflects when diagnosis was documented in both MDM as applicable and the Disposition within this note     Time User Action Codes Description Comment    2020 10:18 PM Sydney Mojica Add [O03 9] Spontaneous miscarriage       ED Disposition     ED Disposition Condition Date/Time Comment    Discharge Stable Sat Sep 19, 2020 10:18 PM Cisco Coker discharge to home/self care              Follow-up Information     Follow up With Specialties Details Why Contact Info    [facility]   [address]  [phone]            Discharge Medication List as of 2020 10:19 PM      CONTINUE these medications which have NOT CHANGED    Details   Cholecalciferol (VITAMIN D)  units CAPS Take by mouth, Historical Med      folic acid (FOLVITE) 264 MCG tablet Take by mouth, Historical Med      misoprostol (CYTOTEC) 200 mcg tablet Take 4 tablets (800 mcg total) by mouth once for 1 dose, Starting Thu 9/17/2020, Normal      Prenatal Vit-DSS-Fe Fum-FA (PRENATAL 19) tablet Take by mouth, Historical Med           No discharge procedures on file      PDMP Review     None          ED Provider  Electronically Signed by           Duy Rich MD  09/20/20 6707

## 2020-09-19 NOTE — TELEPHONE ENCOUNTER
Regarding: Post op problem   ----- Message from Mauricio Shows sent at 2020  3:35 PM EDT -----  "I recently had an  and I was instructed by my OB Gyn to contact the if I started to feel excruciating pain to call the on-call"

## 2020-09-20 NOTE — CONSULTS
Consult - OB/GYN   Rafaela Ortiz 42 y o  female MRN: [id number]  Unit/Bed#: ED 25 Encounter: [id number]    HPI:  Sofia Mendieta is a 29-year-old [id number] who presents with heavy vaginal bleeding  Her LMP was uncertain but she measured 6w2d on her first ultrasound on  but FHT were unable to be confirmed  She was instructed to follow up 1 week later for repeat viability scan due to concern for missed  vs  IUP  She was seen on  and again was found to have hebert IUP at THE [facility] without [address]  She was having some mild spotting and cramping at that time  Patient chose expectant management for missed  but was given a prescription for cytotec if her bleeding did not increase over the next week  She states that this morning her bleeding increased and this afternoon, she was bleeding "like I was peeing"  She states that she also passed golf-ball sized clots  She states that her cramping was like contractions  She did not take any of the cytotec  Patient states that she was having the change her pads every 20 minutes due to the bleeding but is now changing them every 40 minutes  Patient states that her  had a vasectomy  She has a history of 4 previous vaginal deliveries (2 girls and then 2 twin boys)  She states that her 2nd girl and her twins, she had postpartum hemorrhages  She denies any known history of bleeding disorder  Patient has a history of 2 prior miscarriages (one required D&C, the other required medical management)   Review of Systems   Constitutional: Negative for chills and fever  Respiratory: Negative for shortness of breath  Cardiovascular: Negative for chest pain and palpitations  Gastrointestinal: Negative for abdominal pain (was given dilaudid prior to exam), constipation, diarrhea, nausea and vomiting  Genitourinary: Positive for vaginal bleeding  Negative for difficulty urinating  Neurological: Negative for dizziness, light-headedness and headaches  Active Problems:  Patient Active Problem List   Diagnosis    Anxiety state    Esophageal reflux    Left breast lump    Encounter for gynecological examination without abnormal finding    Nasal obstruction    Nasal septal deviation    Nasal turbinate hypertrophy    Well woman exam    Breast tenderness    Prenatal care, subsequent pregnancy, first trimester     PMH:  Past Medical History:   Diagnosis Date    Abnormal Pap smear of cervix     Ear problems     Heartburn during pregnancy     Migraine     Palpitations     Varicella        PSH:  Past Surgical History:   Procedure Laterality Date    DENTAL SURGERY      DILATION AND CURETTAGE OF UTERUS      NASAL SEPTUM SURGERY       Meds:  No current facility-administered medications on file prior to encounter  Current Outpatient Medications on File Prior to Encounter   Medication Sig Dispense Refill    Cholecalciferol (VITAMIN D) 2000 units CAPS Take by mouth      folic acid (FOLVITE) 322 MCG tablet Take by mouth      misoprostol (CYTOTEC) 200 mcg tablet Take 4 tablets (800 mcg total) by mouth once for 1 dose 4 tablet 0    Prenatal Vit-DSS-Fe Fum-FA (PRENATAL 19) tablet Take by mouth         Allergies: Allergies   Allergen Reactions    Escitalopram      Insomnia  twitching       Physical Exam:  BP 99/55 (BP Location: Right arm)   Pulse 60   Temp 98 5 °F (36 9 °C) (Oral)   Resp 18   Wt 78 8 kg (173 lb 11 6 oz)   LMP 07/12/2020   SpO2 99%   BMI 26 41 kg/m²     Physical Exam  Vitals signs and nursing note reviewed  Exam conducted with a chaperone present  Constitutional:       General: She is not in acute distress  Appearance: Normal appearance  She is normal weight  She is not ill-appearing, toxic-appearing or diaphoretic  Cardiovascular:      Rate and Rhythm: Normal rate  Pulmonary:      Effort: Pulmonary effort is normal  No respiratory distress  Abdominal:      General: Abdomen is flat  There is no distension  Palpations: Abdomen is soft  There is no mass  Tenderness: There is no abdominal tenderness  There is no guarding or rebound  Hernia: No hernia is present  Genitourinary:     Exam position: Lithotomy position  Pubic Area: No rash  Labia:         Right: No rash, tenderness, lesion or injury  Left: No rash, tenderness, lesion or injury  Vagina: No signs of injury and foreign body  Bleeding (Blood clots extracted from the vagina along with tissue; blood pooled in the posterior fornix due to patient positioning) present  No vaginal discharge, erythema, tenderness, lesions or prolapsed vaginal walls  Cervix: Dilated  Cervical bleeding (No active bleeding from the cervix on exam after tissue removal) present  No cervical motion tenderness, discharge, friability, lesion, erythema or eversion  Comments: Cervical os slightly dilated  Tissue extracted from the vaginal and cervical os   No active bleeding noted on exam from cervix  Skin:     General: Skin is warm and dry  Neurological:      Mental Status: She is alert and oriented to person, place, and time  Psychiatric:         Mood and Affect: Mood normal          Behavior: Behavior normal          Thought Content: Thought content normal          Judgment: Judgment normal        Assessment:   42 y o  [id number] with current vaginal bleeding secondary to 6 week MAB  Plan:   1  MAB  - VSS and Hgb stable  - Tissue extracted on exam  - Recommended 800mcg buccal cytotec; administered in the ED and monitoring for 2 hours  - Toradol if needed   - Reviewed return precautions    Discussed with Dr Constance Olson MD  OB/GYN  9/20/2020  1:21 AM

## 2020-09-21 NOTE — TELEPHONE ENCOUNTER
Patient was seen in ER for evaluation and management  Was given cytotec and had exam with removal of tissue

## 2020-09-22 DIAGNOSIS — O02.1 MISSED ABORTION: Primary | ICD-10-CM

## 2020-09-22 NOTE — PROGRESS NOTES
Patient was seen in ER on Saturday for heavy bleeding  Jolie Whitman had dropped down to 14,297 from 24, 050 on 9/17/2020  Patient was given cytotec in the ER as well, reports bleeding significantly worsened after she left the ER, considered going back but waited it out and bleeding slowed down on Sunday  Picked up a little on Monday and today but not nearly as bad as Saturday  Reports mild cramping currently  No dizziness or lightheadedness  Will plan to repeat quant today to assess for appropriate drop and then will follow down to zero  Patient aware of s/s to call with  Patient would like tubal ligation as this pregnancy occurred 3 years after her  had a vasectomy  Will schedule discussion appointment

## 2020-10-03 LAB — B-HCG SERPL-ACNC: 133 MIU/ML

## 2020-10-05 ENCOUNTER — OFFICE VISIT (OUTPATIENT)
Dept: OBGYN CLINIC | Facility: CLINIC | Age: 40
End: 2020-10-05
Payer: COMMERCIAL

## 2020-10-05 VITALS
SYSTOLIC BLOOD PRESSURE: 102 MMHG | TEMPERATURE: 97.6 F | WEIGHT: 166 LBS | DIASTOLIC BLOOD PRESSURE: 68 MMHG | BODY MASS INDEX: 25.16 KG/M2 | HEIGHT: 68 IN

## 2020-10-05 DIAGNOSIS — O03.9 SAB (SPONTANEOUS ABORTION): ICD-10-CM

## 2020-10-05 DIAGNOSIS — R10.2 PELVIC PAIN: Primary | ICD-10-CM

## 2020-10-05 PROBLEM — Z34.81 PRENATAL CARE, SUBSEQUENT PREGNANCY, FIRST TRIMESTER: Status: RESOLVED | Noted: 2020-09-11 | Resolved: 2020-10-05

## 2020-10-05 PROCEDURE — 99213 OFFICE O/P EST LOW 20 MIN: CPT | Performed by: OBSTETRICS & GYNECOLOGY

## 2020-10-05 RX ORDER — IBUPROFEN 600 MG/1
600 TABLET ORAL EVERY 6 HOURS PRN
Qty: 60 TABLET | Refills: 1 | Status: SHIPPED | OUTPATIENT
Start: 2020-10-05 | End: 2021-07-27

## 2020-10-09 ENCOUNTER — EVALUATION (OUTPATIENT)
Dept: PHYSICAL THERAPY | Facility: REHABILITATION | Age: 40
End: 2020-10-09
Payer: COMMERCIAL

## 2020-10-09 DIAGNOSIS — M62.89 PELVIC FLOOR DYSFUNCTION IN FEMALE: Primary | ICD-10-CM

## 2020-10-09 DIAGNOSIS — N39.3 SUI (STRESS URINARY INCONTINENCE), MALE: ICD-10-CM

## 2020-10-09 DIAGNOSIS — R10.2 PELVIC PAIN: ICD-10-CM

## 2020-10-09 PROCEDURE — 97162 PT EVAL MOD COMPLEX 30 MIN: CPT | Performed by: PHYSICAL THERAPIST

## 2020-10-09 PROCEDURE — 97530 THERAPEUTIC ACTIVITIES: CPT | Performed by: PHYSICAL THERAPIST

## 2020-10-12 ENCOUNTER — HOSPITAL ENCOUNTER (OUTPATIENT)
Dept: ULTRASOUND IMAGING | Facility: HOSPITAL | Age: 40
Discharge: HOME | End: 2020-10-12
Attending: OBSTETRICS & GYNECOLOGY
Payer: COMMERCIAL

## 2020-10-12 DIAGNOSIS — R10.2 PELVIC PAIN: ICD-10-CM

## 2020-10-12 PROCEDURE — 76856 US EXAM PELVIC COMPLETE: CPT

## 2020-10-13 DIAGNOSIS — Z32.00 POSSIBLE PREGNANCY: Primary | ICD-10-CM

## 2020-10-13 LAB
B-HCG SERPL-ACNC: 20 MIU/ML
BASOPHILS # BLD AUTO: 27 CELLS/UL (ref 0–200)
BASOPHILS NFR BLD AUTO: 0.4 %
EOSINOPHIL # BLD AUTO: 47 CELLS/UL (ref 15–500)
EOSINOPHIL NFR BLD AUTO: 0.7 %
ERYTHROCYTE [DISTWIDTH] IN BLOOD BY AUTOMATED COUNT: 12.5 % (ref 11–15)
HCT VFR BLD AUTO: 36.9 % (ref 35–45)
HGB BLD-MCNC: 12.3 G/DL (ref 11.7–15.5)
LYMPHOCYTES # BLD AUTO: 2258 CELLS/UL (ref 850–3900)
LYMPHOCYTES NFR BLD AUTO: 33.7 %
MCH RBC QN AUTO: 31.9 PG (ref 27–33)
MCHC RBC AUTO-ENTMCNC: 33.3 G/DL (ref 32–36)
MCV RBC AUTO: 95.6 FL (ref 80–100)
MONOCYTES # BLD AUTO: 389 CELLS/UL (ref 200–950)
MONOCYTES NFR BLD AUTO: 5.8 %
NEUTROPHILS # BLD AUTO: 3980 CELLS/UL (ref 1500–7800)
NEUTROPHILS NFR BLD AUTO: 59.4 %
PLATELET # BLD AUTO: 235 THOUSAND/UL (ref 140–400)
PMV BLD REES-ECKER: 10.6 FL (ref 7.5–12.5)
RBC # BLD AUTO: 3.86 MILLION/UL (ref 3.8–5.1)
TSH SERPL-ACNC: 0.84 MIU/L
WBC # BLD AUTO: 6.7 THOUSAND/UL (ref 3.8–10.8)

## 2020-10-15 ENCOUNTER — TELEPHONE (OUTPATIENT)
Dept: OBGYN CLINIC | Facility: CLINIC | Age: 40
End: 2020-10-15

## 2020-10-15 DIAGNOSIS — R93.89 ABNORMAL PELVIC ULTRASOUND: Primary | ICD-10-CM

## 2020-10-15 DIAGNOSIS — Z09 FOLLOW-UP EXAM, 3-6 MONTHS SINCE PREVIOUS EXAM: ICD-10-CM

## 2020-10-15 DIAGNOSIS — O03.9 MISCARRIAGE: ICD-10-CM

## 2020-10-15 LAB — B-HCG SERPL-ACNC: 17 MIU/ML

## 2020-10-22 ENCOUNTER — APPOINTMENT (OUTPATIENT)
Dept: PHYSICAL THERAPY | Facility: REHABILITATION | Age: 40
End: 2020-10-22
Payer: COMMERCIAL

## 2020-10-28 ENCOUNTER — APPOINTMENT (OUTPATIENT)
Dept: PHYSICAL THERAPY | Facility: REHABILITATION | Age: 40
End: 2020-10-28
Payer: COMMERCIAL

## 2020-11-06 LAB — B-HCG SERPL-ACNC: <3 MIU/ML

## 2021-03-29 ENCOUNTER — OFFICE VISIT (OUTPATIENT)
Dept: OBGYN CLINIC | Facility: CLINIC | Age: 41
End: 2021-03-29
Payer: COMMERCIAL

## 2021-03-29 VITALS
HEIGHT: 68 IN | WEIGHT: 179 LBS | SYSTOLIC BLOOD PRESSURE: 118 MMHG | BODY MASS INDEX: 27.13 KG/M2 | DIASTOLIC BLOOD PRESSURE: 76 MMHG

## 2021-03-29 DIAGNOSIS — Z30.09 CONSULTATION FOR FEMALE STERILIZATION: Primary | ICD-10-CM

## 2021-03-29 PROCEDURE — 3008F BODY MASS INDEX DOCD: CPT | Performed by: OBSTETRICS & GYNECOLOGY

## 2021-03-29 PROCEDURE — 99213 OFFICE O/P EST LOW 20 MIN: CPT | Performed by: OBSTETRICS & GYNECOLOGY

## 2021-03-29 PROCEDURE — 1036F TOBACCO NON-USER: CPT | Performed by: OBSTETRICS & GYNECOLOGY

## 2021-03-29 NOTE — PROGRESS NOTES
Miugel Vazquezugsmitha Colon is a 47 y o   female who presents for desire for permanent sterilization  Patient  had vasectomy but he did not get after check and they had pregnancy and miscarriage after  Patient is recovered from miscarriage last   Pain resolved, menses monthly q 26-28 days, one- two day heavy total of 4-5 day cycle  Reviewed options, patient desire permanent sterilization, counseled on laparoscopic bilateral salpingectomy procedure as well as risks and benefits expected recovery course and surgical instructions  Patient reports understanding consent and desires to proceed and has no further questions  Current contraception: condoms  (failed vasectomy)   last Pap 2019 normal    Menstrual History:  OB History        8    Para   3    Term   2       1    AB   4    Living   4       SAB   2    TAB        Ectopic        Multiple   1    Live Births   4                  Past Medical History:   Diagnosis Date    Abnormal Pap smear of cervix     Ear problems     Heartburn during pregnancy     Migraine     Palpitations     Varicella      Past Surgical History:   Procedure Laterality Date    DENTAL SURGERY      DILATION AND CURETTAGE OF UTERUS      NASAL SEPTUM SURGERY       OB History        8    Para   3    Term   2       1    AB   4    Living   4       SAB   2    TAB        Ectopic        Multiple   1    Live Births   4                 Review of Systems  Review of Systems   Constitutional: Negative for chills, fatigue, fever and unexpected weight change  HENT: Negative for dental problem, sinus pressure and sinus pain  Eyes: Negative for visual disturbance  Respiratory: Negative for cough, shortness of breath and wheezing  Cardiovascular: Negative for chest pain and leg swelling  Gastrointestinal: Negative for constipation, diarrhea, nausea and vomiting  Genitourinary: Negative for urgency     Musculoskeletal: Negative for back pain and joint swelling  Allergic/Immunologic: Negative for environmental allergies  Neurological: Negative for dizziness and headaches  Psychiatric/Behavioral: The patient is not nervous/anxious  Objective      LMP 2020   Vitals:    21 0830   BP: 118/76   BP Location: Left arm   Patient Position: Sitting   Cuff Size: Standard   Weight: 81 2 kg (179 lb)   Height: 5' 8" (1 727 m)         General:   alert and oriented, in no acute distress   Heart: regular rate and rhythm, S1, S2 normal, no murmur, click, rub or gallop   Lungs: clear to auscultation bilaterally   Abdomen: soft, non-tender, without masses or organomegaly,  Old supraumbilical piercing             Assessment    47year-old  desire permanent sterilization  Patient fully counseled to procedure risks and benefits and surgical instructions       Plan    laparoscopic bilateral salpingectomy

## 2021-03-31 ENCOUNTER — IMMUNIZATIONS (OUTPATIENT)
Dept: FAMILY MEDICINE CLINIC | Facility: HOSPITAL | Age: 41
End: 2021-03-31

## 2021-03-31 DIAGNOSIS — Z23 ENCOUNTER FOR IMMUNIZATION: Primary | ICD-10-CM

## 2021-03-31 PROCEDURE — 91300 SARS-COV-2 / COVID-19 MRNA VACCINE (PFIZER-BIONTECH) 30 MCG: CPT

## 2021-03-31 PROCEDURE — 0001A SARS-COV-2 / COVID-19 MRNA VACCINE (PFIZER-BIONTECH) 30 MCG: CPT

## 2021-04-21 ENCOUNTER — IMMUNIZATIONS (OUTPATIENT)
Dept: FAMILY MEDICINE CLINIC | Facility: HOSPITAL | Age: 41
End: 2021-04-21

## 2021-04-21 DIAGNOSIS — Z01.818 ENCOUNTER FOR PREADMISSION TESTING: Primary | ICD-10-CM

## 2021-04-21 DIAGNOSIS — Z23 ENCOUNTER FOR IMMUNIZATION: Primary | ICD-10-CM

## 2021-04-21 PROCEDURE — 91300 SARS-COV-2 / COVID-19 MRNA VACCINE (PFIZER-BIONTECH) 30 MCG: CPT

## 2021-04-21 PROCEDURE — 0002A SARS-COV-2 / COVID-19 MRNA VACCINE (PFIZER-BIONTECH) 30 MCG: CPT

## 2021-06-15 ENCOUNTER — TELEPHONE (OUTPATIENT)
Dept: OBGYN CLINIC | Facility: CLINIC | Age: 41
End: 2021-06-15

## 2021-06-15 NOTE — TELEPHONE ENCOUNTER
auth submitted via [employer]  Ref [id number]  Authorized      Search for provider states OON, however facility and office are in network

## 2021-06-28 ENCOUNTER — TELEPHONE (OUTPATIENT)
Dept: OBGYN CLINIC | Facility: CLINIC | Age: 41
End: 2021-06-28

## 2021-06-28 NOTE — TELEPHONE ENCOUNTER
Pt LM, received a positive UPT on Sunday  LMP not on VM  Pt scheduled for tubal 7/6/21 with Dr Guy Botello

## 2021-06-28 NOTE — TELEPHONE ENCOUNTER
LMP 5/28/21  Pt does not want to complete BW at this time  Pt would like to complete US prior to NOB  Pt states in past with her SAB she usually comes to NOB and does not have fetal heart tones or cardiac activity  Pt leaves for vacation 8/1/21 for [facility] orders placed for central scheduling for pt to complete week prior to NOB 7/19/21-7/25/21  NOB 7/27/21

## 2021-07-23 ENCOUNTER — HOSPITAL ENCOUNTER (OUTPATIENT)
Dept: ULTRASOUND IMAGING | Facility: HOSPITAL | Age: 41
Discharge: HOME | End: 2021-07-23
Payer: COMMERCIAL

## 2021-07-23 DIAGNOSIS — Z32.01 POSITIVE URINE PREGNANCY TEST: ICD-10-CM

## 2021-07-23 DIAGNOSIS — Z34.90 EARLY STAGE OF PREGNANCY: ICD-10-CM

## 2021-07-23 PROCEDURE — 76801 OB US < 14 WKS SINGLE FETUS: CPT

## 2021-07-27 ENCOUNTER — INITIAL PRENATAL (OUTPATIENT)
Dept: OBGYN CLINIC | Facility: CLINIC | Age: 41
End: 2021-07-27

## 2021-07-27 VITALS
HEIGHT: 68 IN | BODY MASS INDEX: 26.4 KG/M2 | WEIGHT: 174.2 LBS | SYSTOLIC BLOOD PRESSURE: 118 MMHG | DIASTOLIC BLOOD PRESSURE: 78 MMHG

## 2021-07-27 DIAGNOSIS — N63.0 BREAST LUMP: ICD-10-CM

## 2021-07-27 DIAGNOSIS — Z34.81 MULTIGRAVIDA IN FIRST TRIMESTER: Primary | ICD-10-CM

## 2021-07-27 PROCEDURE — PNV: Performed by: PHYSICIAN ASSISTANT

## 2021-07-27 PROCEDURE — 87591 N.GONORRHOEAE DNA AMP PROB: CPT | Performed by: PHYSICIAN ASSISTANT

## 2021-07-27 PROCEDURE — 87491 CHLMYD TRACH DNA AMP PROBE: CPT | Performed by: PHYSICIAN ASSISTANT

## 2021-07-27 PROCEDURE — 87661 TRICHOMONAS VAGINALIS AMPLIF: CPT | Performed by: PHYSICIAN ASSISTANT

## 2021-07-27 NOTE — PROGRESS NOTES
Pt presents for initial OB appointment  Pap deferred and cx's done today  PE done in office today  I did note right breast lump that pt will plan to have US done at breast care center  Breast feeding/bottle feeding plans: unsure  Was able to pump for first two children and may consider doing it again  MRSA:denies history   Varicella: had infection   Drug/alcohol use history: denies   Slip written for initial OB panel plus SMA and hgb A1C  Genetic testing: desires NIPT, will plan to establish at [facility] at 12-13weeks  Pt aware of AMA risks  We did review [facility] and bleedign risks  US done in office today: TVUS + FHR S+D by LMP  Small [facility] noted measuring approx 2x0 5 cm, which appears smaller than when measured last year  Consents signed: for appts   Pt plans trip to [facility] this weekend  We reviewed bleeding risks with [facility]  PMHX:GERD, anxiety    POBHX:AMA pregnancy [id number] SAB x 2, TAB x 2 1 twin pregnancy     PSURGHX:D&C, nasal septum deviation, dental surgery       All questions answered  Ultrasound Probe Disinfection    A transvaginal ultrasound was performed     Probe identification: probe serial number [employer]: [id number]  Disinfection process: Disinfection was performed with High Level Disinfection Process (Trophon)    Cornelius Day PA-C

## 2021-07-28 LAB
C TRACH DNA SPEC QL NAA+PROBE: NEGATIVE
N GONORRHOEA DNA SPEC QL NAA+PROBE: NEGATIVE

## 2021-07-29 ENCOUNTER — APPOINTMENT (OUTPATIENT)
Dept: LAB | Facility: HOSPITAL | Age: 41
End: 2021-07-29
Payer: COMMERCIAL

## 2021-07-29 ENCOUNTER — HOSPITAL ENCOUNTER (OUTPATIENT)
Dept: ULTRASOUND IMAGING | Facility: CLINIC | Age: 41
Discharge: HOME | End: 2021-07-29
Payer: COMMERCIAL

## 2021-07-29 DIAGNOSIS — N63.0 BREAST LUMP: ICD-10-CM

## 2021-07-29 DIAGNOSIS — Z34.81 MULTIGRAVIDA IN FIRST TRIMESTER: Primary | ICD-10-CM

## 2021-07-29 LAB
ABO GROUP BLD: NORMAL
BACTERIA UR QL AUTO: ABNORMAL /HPF
BASOPHILS # BLD AUTO: 0.02 THOUSANDS/ΜL (ref 0–0.1)
BASOPHILS NFR BLD AUTO: 0 % (ref 0–1)
BILIRUB UR QL STRIP: NEGATIVE
BLD GP AB SCN SERPL QL: NEGATIVE
CLARITY UR: CLEAR
COLOR UR: YELLOW
EOSINOPHIL # BLD AUTO: 0.08 THOUSAND/ΜL (ref 0–0.61)
EOSINOPHIL NFR BLD AUTO: 1 % (ref 0–6)
ERYTHROCYTE [DISTWIDTH] IN BLOOD BY AUTOMATED COUNT: 11.9 % (ref 11.6–15.1)
GLUCOSE UR STRIP-MCNC: NEGATIVE MG/DL
HCT VFR BLD AUTO: 36.9 % (ref 34.8–46.1)
HGB BLD-MCNC: 12.8 G/DL (ref 11.5–15.4)
HGB UR QL STRIP.AUTO: NEGATIVE
IMM GRANULOCYTES # BLD AUTO: 0.03 THOUSAND/UL (ref 0–0.2)
IMM GRANULOCYTES NFR BLD AUTO: 0 % (ref 0–2)
KETONES UR STRIP-MCNC: NEGATIVE MG/DL
LEUKOCYTE ESTERASE UR QL STRIP: NEGATIVE
LYMPHOCYTES # BLD AUTO: 1.72 THOUSANDS/ΜL (ref 0.6–4.47)
LYMPHOCYTES NFR BLD AUTO: 19 % (ref 14–44)
MCH RBC QN AUTO: 32.7 PG (ref 26.8–34.3)
MCHC RBC AUTO-ENTMCNC: 34.7 G/DL (ref 31.4–37.4)
MCV RBC AUTO: 94 FL (ref 82–98)
MONOCYTES # BLD AUTO: 0.5 THOUSAND/ΜL (ref 0.17–1.22)
MONOCYTES NFR BLD AUTO: 6 % (ref 4–12)
NEUTROPHILS # BLD AUTO: 6.67 THOUSANDS/ΜL (ref 1.85–7.62)
NEUTS SEG NFR BLD AUTO: 74 % (ref 43–75)
NITRITE UR QL STRIP: NEGATIVE
NON-SQ EPI CELLS URNS QL MICRO: ABNORMAL /HPF
NRBC BLD AUTO-RTO: 0 /100 WBCS
PH UR STRIP.AUTO: 5.5 [PH]
PLATELET # BLD AUTO: 254 THOUSANDS/UL (ref 149–390)
PMV BLD AUTO: 9.9 FL (ref 8.9–12.7)
PROT UR STRIP-MCNC: NEGATIVE MG/DL
RBC # BLD AUTO: 3.91 MILLION/UL (ref 3.81–5.12)
RBC #/AREA URNS AUTO: ABNORMAL /HPF
RH BLD: POSITIVE
SP GR UR STRIP.AUTO: >=1.03 (ref 1–1.03)
SPECIMEN EXPIRATION DATE: NORMAL
UROBILINOGEN UR QL STRIP.AUTO: 0.2 E.U./DL
WBC # BLD AUTO: 9.02 THOUSAND/UL (ref 4.31–10.16)
WBC #/AREA URNS AUTO: ABNORMAL /HPF

## 2021-07-29 PROCEDURE — 76642 ULTRASOUND BREAST LIMITED: CPT

## 2021-07-29 PROCEDURE — 80081 OBSTETRIC PANEL INC HIV TSTG: CPT

## 2021-07-29 PROCEDURE — 36415 COLL VENOUS BLD VENIPUNCTURE: CPT

## 2021-07-29 PROCEDURE — 81329 SMN1 GENE DOS/DELETION ALYS: CPT

## 2021-07-29 PROCEDURE — 86803 HEPATITIS C AB TEST: CPT

## 2021-07-29 PROCEDURE — 83020 HEMOGLOBIN ELECTROPHORESIS: CPT

## 2021-07-29 PROCEDURE — 87086 URINE CULTURE/COLONY COUNT: CPT

## 2021-07-29 PROCEDURE — 81001 URINALYSIS AUTO W/SCOPE: CPT | Performed by: PHYSICIAN ASSISTANT

## 2021-07-30 LAB
BACTERIA UR CULT: NORMAL
HBV SURFACE AG SER QL: NORMAL
HCV AB SER QL: NORMAL
HIV 1+2 AB+HIV1 P24 AG SERPL QL IA: NORMAL
RPR SER QL: NORMAL
RUBV IGG SERPL IA-ACNC: >175 IU/ML
T VAGINALIS RRNA SPEC QL NAA+PROBE: NEGATIVE

## 2021-08-03 LAB
HGB A MFR BLD: 2.6 % (ref 1.8–3.2)
HGB A MFR BLD: 97.4 % (ref 96.4–98.8)
HGB F MFR BLD: 0 % (ref 0–2)
HGB FRACT BLD-IMP: NORMAL
HGB S MFR BLD: 0 %

## 2021-08-05 LAB
CLINICAL INFO: NORMAL
ETHNIC BACKGROUND STATED: NORMAL
GENE MUT TESTED BLD/T: NORMAL
GENERAL COMMENTS:: NORMAL
LAB DIRECTOR NAME PROVIDER: NORMAL
REASON FOR REFERRAL (NARRATIVE): NORMAL
REF LAB TEST METHOD: NORMAL
SL AMB DISCLAIMER: NORMAL
SL AMB GENETIC COUNSELOR: NORMAL
SMN1 GENE MUT ANL BLD/T: NORMAL
SPECIMEN SOURCE: NORMAL

## 2021-08-09 ENCOUNTER — TELEPHONE (OUTPATIENT)
Dept: OBGYN CLINIC | Facility: CLINIC | Age: 41
End: 2021-08-09

## 2021-08-09 NOTE — TELEPHONE ENCOUNTER
Initiated on [employer] - sent to [id number] [facility] site for [id number]    Tracking # [id number]  Ref# [id number]

## 2021-08-29 PROBLEM — Z01.419 WELL WOMAN EXAM: Status: RESOLVED | Noted: 2020-06-15 | Resolved: 2021-08-29

## 2021-08-29 PROBLEM — Z01.419 ENCOUNTER FOR GYNECOLOGICAL EXAMINATION WITHOUT ABNORMAL FINDING: Status: RESOLVED | Noted: 2019-04-05 | Resolved: 2021-08-29

## 2021-08-29 NOTE — PATIENT INSTRUCTIONS
Pregnancy at 11 to 14 Weeks   AMBULATORY CARE:   Changes happening to your body: You are now at the end of your first trimester and entering your second trimester  Morning sickness usually goes away by this time  You may have other symptoms such as fatigue, frequent urination, and headaches  You may have gained 2 to 4 pounds by now  Seek care immediately if:   · You have pain or cramping in your abdomen or low back  · You have heavy vaginal bleeding or clotting  · You pass material that looks like tissue or large clots  Collect the material and bring it with you  Call your doctor or obstetrician if:   · You cannot keep food or drinks down, and you are losing weight  · You have light vaginal bleeding  · You have chills or a fever  · You have vaginal itching, burning, or pain  · You have yellow, green, white, or foul-smelling vaginal discharge  · You have pain or burning when you urinate, less urine than usual, or pink or bloody urine  · You have questions or concerns about your condition or care  How to care for yourself at this stage of your pregnancy:   · Get plenty of rest   You may feel more tired than normal  You may need to take naps or go to bed earlier  · Manage nausea and vomiting  Avoid fatty and spicy foods  Eat small meals throughout the day instead of large meals  Ana may help to decrease nausea  Ask your healthcare provider about other ways of decreasing nausea and vomiting  · Eat a variety of healthy foods  Healthy foods include fruits, vegetables, whole-grain breads, low-fat dairy foods, beans, lean meats, and fish  Drink liquids as directed  Ask how much liquid to drink each day and which liquids are best for you  Limit caffeine to less than 200 milligrams each day  Limit your intake of fish to 2 servings each week  Choose fish low in mercury such as canned light tuna, shrimp, salmon, cod, or tilapia   Do not  eat fish high in mercury such as swordfish, tilefish, bere mackerel, and shark  · Take prenatal vitamins as directed  Your need for certain vitamins and minerals, such as folic acid, increases during pregnancy  Prenatal vitamins provide some of the extra vitamins and minerals you need  Prenatal vitamins may also help to decrease the risk of certain birth defects  · Do not smoke  Smoking increases your risk of a miscarriage and other health problems during your pregnancy  Smoking can cause your baby to be born too early or weigh less at birth  Ask your healthcare provider for information if you need help quitting  · Do not drink alcohol  Alcohol passes from your body to your baby through the placenta  It can affect your baby's brain development and cause fetal alcohol syndrome (FAS)  FAS is a group of conditions that causes mental, behavior, and growth problems  · Talk to your healthcare provider before you take any medicines  Many medicines may harm your baby if you take them when you are pregnant  Do not take any medicines, vitamins, herbs, or supplements without first talking to your healthcare provider  Never use illegal or street drugs (such as marijuana or cocaine) while you are pregnant  Safety tips during pregnancy:   · Avoid hot tubs and saunas  Do not use a hot tub or sauna while you are pregnant, especially during your first trimester  Hot tubs and saunas may raise your baby's temperature and increase the risk of birth defects  · Avoid toxoplasmosis  This is an infection caused by eating raw meat or being around infected cat feces  It can cause birth defects, miscarriages, and other problems  Wash your hands after you touch raw meat  Make sure any meat is well-cooked before you eat it  Avoid raw eggs and unpasteurized milk  Use gloves or ask someone else to clean your cat's litter box while you are pregnant  Changes happening with your baby: Your baby has fully formed fingernails and toenails   Your baby's heartbeat can now be heard  Ask your healthcare provider if you can listen to your baby's heartbeat  By week 14, your baby is over 4 inches long from the top of the head to the rump (baby's bottom)  Your baby weighs over 3 ounces  Prenatal care:  Prenatal care is a series of visits with your healthcare provider throughout your pregnancy  During the first 28 weeks of your pregnancy, you will see your healthcare provider 1 time each month  Prenatal care can help prevent problems during pregnancy and childbirth  Your healthcare provider will check your blood pressure and weight  Your baby's heart rate will also be checked  You may also need the following at some visits:  · A pelvic exam  allows your healthcare provider to see your cervix (the bottom part of your uterus)  Your healthcare provider will use a speculum to open your vagina  He or she will check the size and shape of your uterus  · Blood tests  may be done to check for any of the following:     ? Gestational diabetes or anemia (low iron level)    ? Blood type or Rh factor, or certain birth defects    ? Immunity to certain diseases, such as chickenpox or rubella    ? An infection, such as a sexually transmitted infection, HIV, or hepatitis B    · Hepatitis B  may need to be prevented or treated  Hepatitis B is inflammation of the liver caused by the hepatitis B virus (HBV)  HBV can spread from a mother to her baby during delivery  You will be checked for HBV as early as possible in the first trimester of each pregnancy  You need the test even if you received the hepatitis B vaccine or were tested before  You may need to have an HBV infection treated before you give birth  · Urine tests  may also be done to check for sugar and protein  These can be signs of gestational diabetes or preeclampsia  Urine tests may also be done to check for signs of infection  · A fetal ultrasound  shows pictures of your baby inside your uterus   The pictures are used to check your baby's development, movement, and position  · Genetic disorder screening tests  may be offered to you  These tests check your baby's risk for genetic disorders such as Down syndrome  A screening test includes a blood test and ultrasound  Follow up with your doctor or obstetrician as directed:  Go to all prenatal visits  Write down your questions so you remember to ask them during your visits  © Copyright [employer] 2021 Information is for End User's use only and may not be sold, redistributed or otherwise used for commercial purposes  All illustrations and images included in CareNotes® are the copyrighted property of [employer]  or [facility] Isaac Akers   The above information is an  only  It is not intended as medical advice for individual conditions or treatments  Talk to your doctor, nurse or pharmacist before following any medical regimen to see if it is safe and effective for you

## 2021-08-30 ENCOUNTER — ROUTINE PRENATAL (OUTPATIENT)
Dept: OBGYN CLINIC | Facility: CLINIC | Age: 41
End: 2021-08-30

## 2021-08-30 VITALS
BODY MASS INDEX: 27.51 KG/M2 | HEIGHT: 68 IN | WEIGHT: 181.5 LBS | DIASTOLIC BLOOD PRESSURE: 70 MMHG | SYSTOLIC BLOOD PRESSURE: 118 MMHG

## 2021-08-30 DIAGNOSIS — Z34.91 PRENATAL CARE IN FIRST TRIMESTER: Primary | ICD-10-CM

## 2021-08-30 DIAGNOSIS — Z3A.13 13 WEEKS GESTATION OF PREGNANCY: ICD-10-CM

## 2021-08-30 PROCEDURE — PNV: Performed by: STUDENT IN AN ORGANIZED HEALTH CARE EDUCATION/TRAINING PROGRAM

## 2021-08-30 NOTE — PROGRESS NOTES
Cryprakash is a 48 yo  at 13 weeks and 3 presenting for routine PNC  Denies cramping, VB or LOF  Some HA and nausea that are treated with tylenol and nausea improving  Reviewed good PO hydration  Has not yet set up genetic screening- reviewed again today and desires NIPT and to follow up with MFM  Consult slip reprinted  Reviewed PNL with patient- within normal limits  Reports had breast lump follow up at breast center and was raised tissue with no concern  RTO in 4 weeks for routine PNC

## 2021-08-31 ENCOUNTER — TELEPHONE (OUTPATIENT)
Dept: PERINATAL CARE | Facility: CLINIC | Age: 41
End: 2021-08-31

## 2021-08-31 ENCOUNTER — ROUTINE PRENATAL (OUTPATIENT)
Dept: PERINATAL CARE | Facility: OTHER | Age: 41
End: 2021-08-31
Payer: COMMERCIAL

## 2021-08-31 VITALS
HEART RATE: 78 BPM | BODY MASS INDEX: 27.52 KG/M2 | SYSTOLIC BLOOD PRESSURE: 104 MMHG | DIASTOLIC BLOOD PRESSURE: 66 MMHG | HEIGHT: 68 IN | WEIGHT: 181.6 LBS

## 2021-08-31 DIAGNOSIS — Z36.82 ENCOUNTER FOR (NT) NUCHAL TRANSLUCENCY SCAN: Primary | ICD-10-CM

## 2021-08-31 DIAGNOSIS — Z3A.13 13 WEEKS GESTATION OF PREGNANCY: ICD-10-CM

## 2021-08-31 PROBLEM — Z84.89 FAMILY HISTORY OF GENETIC DISEASE: Status: ACTIVE | Noted: 2021-08-31

## 2021-08-31 PROBLEM — J34.89 NASAL OBSTRUCTION: Status: RESOLVED | Noted: 2019-09-03 | Resolved: 2021-08-31

## 2021-08-31 PROBLEM — N64.4 BREAST TENDERNESS: Status: RESOLVED | Noted: 2020-06-15 | Resolved: 2021-08-31

## 2021-08-31 PROCEDURE — 76813 OB US NUCHAL MEAS 1 GEST: CPT | Performed by: OBSTETRICS & GYNECOLOGY

## 2021-08-31 PROCEDURE — 3008F BODY MASS INDEX DOCD: CPT | Performed by: OBSTETRICS & GYNECOLOGY

## 2021-08-31 PROCEDURE — 99242 OFF/OP CONSLTJ NEW/EST SF 20: CPT | Performed by: OBSTETRICS & GYNECOLOGY

## 2021-08-31 PROCEDURE — 1036F TOBACCO NON-USER: CPT | Performed by: OBSTETRICS & GYNECOLOGY

## 2021-08-31 NOTE — PROGRESS NOTES
Patient aware insurance requires a prior authorization  Information sent to Flory Laboy to initiate a prior authorization  Patient chose to use [facility] lab and was given lab slip for Noninvasive Prenatal Screen [facility]  Blood sample is drawn at [facility] and online appointment scheduling and lab locations can be found @ www  [facility]     [facility]  card given, patient instructed how to check her out- of -pocket responsibility @ [employer]  Patient made aware if [facility]  unable to give an estimate she will need to contact [facility] office prior to blood draw  Insurance may require prior authorization, if test drawn without prior authorization pateint will be responsible for full cost of test   All [employer] products require prior authorization @ this time, [facility] office will initiate the authorization, this may take 7-14 days  Patient aware that  is provided by third party and is only an estimate of cost not a guarantee  For definitive cost, patient encouraged to call her insurance provider- insurance phone # located on the back of her ID card  Patient verbalized understanding of all instructions and no questions at this time

## 2021-08-31 NOTE — TELEPHONE ENCOUNTER
Called patient to confirm Maternal Fetal Medicine virtual appt scheduled for 9/1/21  1:00  Shakila Knowles  Confirmed with patient that she will receive a prompt, by her preference of text  Explained procedure for virtual visit and requested she be ready to log into appointment approximately 10 minutes prior to scheduled start time  Reminder the [facility] Provider will send her the link to join virtual appt near the scheduled appointment time  Also confirmed with pt current text info and current insurance information  Patient instructed to call [facility] office @ #[phone] for technical support issues or any questions regarding the procedure for virtual appt       PATIENT VERBALIZED UNDERSTANDING AND HAS NO QUESTIONS AT THIS TIME

## 2021-08-31 NOTE — PROGRESS NOTES
[address]: Ms Azar Longoria was seen today at 13w4d for nuchal translucency ultrasound  See ultrasound report under "OB Procedures" tab  My recommendations are as follows:  1  We reviewed the availability of genetic screening, as well as diagnostic testing, which are available to all pregnant women  We reviewed her age-related aneuploidy risk  We reviewed limitations, risks, and benefits of screening and testing  We reviewed the differences between Sequential Screen and NIPT (non-invasive prenatal screening) as well as that insurance coverage and therefore out-of-pocket costs may vary  She elected to proceed with NIPT, and was provided a lab requisition to have it drawn, as well as information on how to estimate her out of pocket cost and need for possible prior authorization  MSAFP screening should be ordered through your office at 15-20 weeks gestation, and completed prior to fetal anatomic survey  She does not wish to pursue diagnostic testing at this time  A detailed anatomic survey as well as transvaginal cervical length screening are recommended between 18-22 weeks gestation  2  We briefly discussed her sister's history of Fady[place] Syndrome  Brookeez Tavarez has genetic counseling scheduled tomorrow to explore this family history in further detail  We did discuss that NIPT screening is not diagnostic for this syndrome  3  Given history of early onset colon cancer in her maternal aunt, I recommend that she be referred to the Family Cancer Risk Evaluation Program for genetic counseling  She can schedule an appointment either at the [facility] ([phone]), or [facility] ([phone])      Please don't hesitate to contact our office with any concerns or questions     -Camille Combs MD

## 2021-08-31 NOTE — LETTER
August 31, 2021     Georgette Brunner MD  [facility] [address]    Patient: Archie Douglass   YOB: 1980   Date of Visit: 8/31/2021       Dear Dr Jamal Gomez:    Thank you for referring Alia Bach to me for evaluation  Below are my notes for this consultation  If you have questions, please do not hesitate to call me  I look forward to following your patient along with you  Sincerely,        Supa Landrum MD        CC: No Recipients  Supa Landrum MD  8/31/2021  1:10 PM  Sign when Signing Visit  [address]: Ms Chris Campuzano was seen today at 13w4d for nuchal translucency ultrasound  See ultrasound report under "OB Procedures" tab  My recommendations are as follows:  1  We reviewed the availability of genetic screening, as well as diagnostic testing, which are available to all pregnant women  We reviewed her age-related aneuploidy risk  We reviewed limitations, risks, and benefits of screening and testing  We reviewed the differences between Sequential Screen and NIPT (non-invasive prenatal screening) as well as that insurance coverage and therefore out-of-pocket costs may vary  She elected to proceed with NIPT, and was provided a lab requisition to have it drawn, as well as information on how to estimate her out of pocket cost and need for possible prior authorization  MSAFP screening should be ordered through your office at 15-20 weeks gestation, and completed prior to fetal anatomic survey  She does not wish to pursue diagnostic testing at this time  A detailed anatomic survey as well as transvaginal cervical length screening are recommended between 18-22 weeks gestation  2  We briefly discussed her sister's history of Fady-[place] Syndrome  Marylee Castor has genetic counseling scheduled tomorrow to explore this family history in further detail   We did discuss that NIPT screening is not diagnostic for this syndrome  3  Given history of early onset colon cancer in her maternal aunt, I recommend that she be referred to the Family Cancer Risk Evaluation Program for genetic counseling  She can schedule an appointment either at the [facility] ([phone]), or [facility] ([phone])      Please don't hesitate to contact our office with any concerns or questions     -J Carlos Perez MD

## 2021-09-01 ENCOUNTER — TELEMEDICINE (OUTPATIENT)
Dept: PERINATAL CARE | Facility: CLINIC | Age: 41
End: 2021-09-01

## 2021-09-01 DIAGNOSIS — O09.521 MATERNAL AGE 35+, MULTIGRAVIDA, ANTEPARTUM, FIRST TRIMESTER: Primary | ICD-10-CM

## 2021-09-01 DIAGNOSIS — Z84.89 FH: GENETIC DISORDER: ICD-10-CM

## 2021-09-01 DIAGNOSIS — O26.21 HABITUAL ABORTER, CURRENTLY PREGNANT IN FIRST TRIMESTER: ICD-10-CM

## 2021-09-01 PROCEDURE — NC001 PR NO CHARGE: Performed by: GENETIC COUNSELOR, MS

## 2021-09-01 NOTE — PROGRESS NOTES
Virtual Regular Visit    Verification of patient location:    Patient is located in the following state in which I hold an active license [place]      Assessment/Plan:    Problem List Items Addressed This Visit     None               Reason for visit is   Chief Complaint   Patient presents with    Virtual Regular Visit        Encounter provider Rinku Woodall    Provider located at [address] [place] [address] [id number] [phone]      Recent Visits  Date Type Provider Dept   08/31/21 Telephone Lara Quiñones, [address]    Showing recent visits within past 7 days and meeting all other requirements  Future Appointments  No visits were found meeting these conditions  Showing future appointments within next 150 days and meeting all other requirements       The patient was identified by name and date of birth  Kiko Marino was informed that this is a telemedicine visit and that the visit is being conducted through [address] [facility] and patient was informed that this is a secure, HIPAA-compliant platform  She agrees to proceed     My office door was closed  The patient was notified the following individuals were present in the room new hire, PCA who is observing  She acknowledged consent and understanding of privacy and security of the video platform  The patient has agreed to participate and understands they can discontinue the visit at any time  Patient is aware this is a billable service       Subjective  Genetic Counseling Note        Kiko Marino     Appointment Date:  9/1/2021  Referred By: Jacques aFtima, *  YOB: 1980  Partner:  Solo Gu    Pregnancy History: [id number]  Estimated Date of Delivery: 3/4/22  Estimated Gestational Age: 17 weeks 5 days     Genetic Counseling:advanced maternal age and personal and/or family history of genetic disorder:  sister with Fady-Amber syndrome    Issues Discussed: Average population risk: 3-4% in the average pregnancy of serious condition or birth defect  2-3% risk of mental retardation  Not all detected by prenatal testing  Chromosomal: non-disjunction 1/80 overall, 1/146 for Down syndrome specifically at the age of 46 at delivery  Mode of inheritance/mechanism: sporadic mitochondrial DNA mutation causing [place]-Amber syndrome  Maternal age  Test results  Habitual aborter    Options Discussed:  Amniocentesis: risks and limitations discussed  Ethnic screening discussed: clinical and genetic basis of CF, SMA, Fragile X, hemoglobinopathies and expanded carrier screening  Variability and treatment addressed  Level II ultrasound to screen for structural anomalies  Serum AFP screen recommended at 15-17 weeks to check for open neural tube defects  Cell free fetal DNA testing  Blood chromosome analysis on patient and partner    Additional Information / Impression / Plan / Tests Ordered:  Fadi Yanira presents for genetic counseling to discuss maternal age related risk for aneuploidy and concerns related to family history of [place] syndrome  Zuleika had NT ultrasound performed yesterday the results of which were within expected limits  Please refer to the report from Dr Neli Mancini for more detailed information  The patient was advised of her age-related risk for aneuploidy as indicated above  We also discussed the estimated 1/250 risk for chromosomal copy number variants across age groups  Zuleika's pregnancy history is significant for three prior unexplained first-trimester pregnancy losses  We discussed that once a couple as experienced three or more unexplained pregnancy losses there is an estimated 3-5% chance that one member of the couple is a carrier of a balanced chromosome translocation  Zuleika was made aware of the option for blood chromosome analysis on her  to determine if this is the case  She elected to pursue this testing    Prior authorization is likely required and has been requested  The risks, benefits, and limitations of amniocentesis were discussed with the patient  Amniocentesis is performed under direct real time ultrasound visualization to avoid both the fetus and the placenta  Once amniotic fluid is withdrawn, laboratory analysis is performed and amniotic fluid alpha-fetoprotein, as well as chromosome and/or microarray analysis is undertaken  The risk of genetic amniocentesis includes, but is not limited to less than 1 in 300 pregnancy loss rate or  delivery rate if 23 weeks or greater, infection, bleeding, rupture of membranes, failure of cells to grow, karyotype error, laboratory error, etc   Occasionally a repeat amniocentesis is necessary due to cell culture failure  Chromosome/microarray analysis from amniocentesis is 99 9% accurate and alpha-fetoprotein analysis can detect approximately 95% of open neural tube defects  We reviewed the testing option of cell free fetal DNA screening (also known as noninvasive prenatal testing or NIPT)  We discussed that it is a serum test to identify fragments of fetal DNA in maternal blood  We reviewed the benefits and limitations of cell free fetal DNA screening in detecting Down syndrome, Trisomy 13, Trisomy 25 and sex chromosome aneuploidies  We also discussed that cell free fetal DNA screening does not detect additional chromosomal abnormalities and the possibility of a failed test result  As cell free fetal DNA screening does not detect open neural tube defects, MSAFP screening is available at 15-20 weeks gestation  We reviewed that level II anatomy ultrasound is typically performed at approximately 20 weeks gestation  Level II ultrasound evaluation is between 60-80% accurate in detecting major physical birth defects and variations in fetal development that may be associated with chromosome abnormalities    Level II ultrasound evaluation is not able to detect all birth defects or health problems  After discussing the available prenatal diagnostic and screening procedures Zuleika elected to decline prenatal diagnostic testing at this time  She did opt to pursue cell free fetal DNA testing following her NT ultrasound yesterday  Prior authorization is required and has been requested  In addition, [place] is scheduled for Level II ultrasound evaluation  MSAFP is expected to be ordered by her referring OB provider  During our counseling session histories were taken on the patient's family and her 's family  Zuleika had previously reported at the time of her NT ultrasound history of her sister having been diagnosed at the age of 13 with Fady-Amber syndrome which is characterized by ptosis, pigmentary retinopathy, muscle weakness and ragged red fibers  Most cases of Fady-[place] syndrome are sporadic and caused by a single, large mitochondrial DNA deletion  Zuleika denies any other individuals in her family being affected with this condition therefore her sister is likely to representing a sporadic case in which case her risk is not increased over anyone else in the general population for having a child with this condition  Zuleika's history is also significant for her mother having a 24 week stillbirth  Zuleika was not able to provide any details regarding this fetal death therefore it is not possible to clearly define any risk that this history could pose to her current pregnancy  Zuleika's history is also significant for a daughter having premature adrenarche  She denies this daughter having a known genetic diagnosis associated with this history  She did have some questions about some symptoms her daughter is currently experiencing including sweaty palms and soles of the feet  She was directed back to her daughter's pediatric endocrinologist for further discussion       Zuleika reports being of [place] and [facility] descent and that her  is of [place] an  descent  She denies either of them having any known [place] ancestry  Carrier screening for CF, SMA, hemoglobinopathies, Fragile X and expanded carrier screening were discussed  Crystal elected to decline all genetic carrier screening except for Fragile X testing  Prior authorization for Fragile X carrier screening will be requested from her insurance    Lastly, we discussed the fact that it is important to keep in mind that everyone in the general population regardless of age, family history, or medical background has approximately a 3% risk of having a child with some type of her defect, genetic disease or intellectual disability  Currently there are no tests available to rule out all birth defects or health problems  Plan:  1  Patient declines prenatal diagnostic testing at this time  2  Patient elects NIPT  Prior authorization required and has been requested  3  Patient elects blood chromosome analysis on her and her   Prior authorization has been requested  4  Patient elects carrier screening for Fragile X  Prior authorization has been requested  She declined carrier screening for CF, SMA, hemoglobinopathies and expanded carrier screening  5  MSAFP to be ordered by referring OB provider  6  Patient scheduled for Level II ultrasound  Time spent with Genetic Counselor: 45 minutes                       HPI     Past Medical History:   Diagnosis Date    Abnormal Pap smear of cervix     Ear problems     Heartburn during pregnancy     Migraine     Palpitations     Varicella        Past Surgical History:   Procedure Laterality Date    BREAST CYST ASPIRATION Left 2014    cyst asp     DENTAL SURGERY      DILATION AND CURETTAGE OF UTERUS      NASAL SEPTUM SURGERY         Current Outpatient Medications   Medication Sig Dispense Refill    Prenatal Vit-Fe Fumarate-FA (PRENATAL VITAMIN PO) Take by mouth       No current facility-administered medications for this visit  No Known Allergies    Review of Systems    Video Exam    There were no vitals filed for this visit  Physical Exam     I spent 45 minutes directly with the patient during this visit    Läli Dawkins verbally agrees to participate in [employer]  Pt is aware that [employer] could be limited without vital signs or the ability to perform a full hands-on physical exam  Zuleika Brice understands she or the provider may request at any time to terminate the video visit and request the patient to seek care or treatment in person

## 2021-09-03 ENCOUNTER — DOCUMENTATION (OUTPATIENT)
Dept: PERINATAL CARE | Facility: CLINIC | Age: 41
End: 2021-09-03

## 2021-09-03 NOTE — PROGRESS NOTES
Requested auth for [id number], [id number], [id number], [id number] per the [id number] is only require for [id number] as long as she go in network    Reference # [id number]

## 2021-09-08 ENCOUNTER — TELEPHONE (OUTPATIENT)
Dept: PERINATAL CARE | Facility: CLINIC | Age: 41
End: 2021-09-08

## 2021-09-08 NOTE — TELEPHONE ENCOUNTER
TC to patient  Confirmed   Reported that per our , Lucy Snyder, that [facility] is required only for Fragile X testing (documentation scanned in separately)  However, [facility] and [facility] are both out of network  Patient advised to contact her insurance company to find out what lab is in network for genetic testing  Patient to call back to call back with that information so that testing can be ordered correctly

## 2021-09-13 ENCOUNTER — TELEPHONE (OUTPATIENT)
Dept: PERINATAL CARE | Facility: CLINIC | Age: 41
End: 2021-09-13

## 2021-09-13 DIAGNOSIS — Z31.430 ENCOUNTER OF FEMALE FOR TESTING FOR GENETIC DISEASE CARRIER STATUS FOR PROCREATIVE MANAGEMENT: ICD-10-CM

## 2021-09-13 DIAGNOSIS — O26.22 PREGNANCY CARE OF HABITUAL ABORTER IN SECOND TRIMESTER: Primary | ICD-10-CM

## 2021-09-13 NOTE — TELEPHONE ENCOUNTER
Received in coming telephone call from patient  Confirm date of birth  Patient stated that the information regarding her insurance not being in network with [employer] is incorrect and that she confirmed with her insurance company herself that she can go to [facility]  Patient is planning to have blood drawn for cell free fetal DNA testing  Advised her that I would need to confirm whether not the reference number on the insurance information was infected prior authorization for fragile X carrier screening  Discussed with Jolanta Jarvis  She stated that blood chromosome analysis on the patient and her  does not require prior authorization  Fragile X testing does require prior authorization hand is not yet been requested  Asked her to initiate a prior authorization for Fragile X testing  TC to patient  Advised patient of the above information  Stated that I would be forwarded her the test requisition forms but not to go for Fragile X carrier testing until she has received a phone call the prior authorization was obtained  Placed orders electronically for blood chromosome analysis for the patient her  and Fragile X carrier screening on the patient and mailed to the patient's home address

## 2021-09-15 ENCOUNTER — TELEPHONE (OUTPATIENT)
Dept: PERINATAL CARE | Facility: CLINIC | Age: 41
End: 2021-09-15

## 2021-09-15 NOTE — TELEPHONE ENCOUNTER
TC to patient  L/M informing her that Fragile X was denied  Advised her that paperwork has already been mailed and that if she decides to proceed with testing she will be paying out of pocket

## 2021-09-17 LAB
# FETUSES US: 1
CFDNA.FET/TOTAL PLAS.CFDNA: NORMAL
FET CHR 13 TS PLAS.CFDNA QL: NEGATIVE
FET CHR 18 TS PLAS.CFDNA QL: NEGATIVE
FET CHR 21 TS PLAS.CFDNA QL: NEGATIVE
FET CHR X + Y ANEUP PLAS.CFDNA QL: NORMAL
FET CHROM X + Y ANEUP CFDNA IMP: NORMAL
FET Y CHROM PLAS.CFDNA QL: DETECTED
FET Y CHROM PLAS.CFDNA: NORMAL
GA (DAYS): 3 D
GA (WEEKS): 15 WK
MICRODELETION SYND BLD/T FISH: NORMAL
REF LAB TEST METHOD: NORMAL
SERVICE CMNT-IMP: NORMAL
SERVICE CMNT-IMP: NORMAL
SL AMB ABNORMAL MSS?: NORMAL
SL AMB ABNORMAL US?: NORMAL
SL AMB ADVANCED MATERNAL AGE?: NORMAL
SL AMB MICRODELETION INTERP: NORMAL
SL AMB MICRODELETION: NOT DETECTED
SL AMB PERSONAL/FAM HISTORY?: NORMAL
SL AMB SPECIFICATIONS: NORMAL

## 2021-09-20 ENCOUNTER — ROUTINE PRENATAL (OUTPATIENT)
Dept: OBGYN CLINIC | Facility: CLINIC | Age: 41
End: 2021-09-20

## 2021-09-20 VITALS
WEIGHT: 183 LBS | BODY MASS INDEX: 27.74 KG/M2 | SYSTOLIC BLOOD PRESSURE: 108 MMHG | HEIGHT: 68 IN | DIASTOLIC BLOOD PRESSURE: 74 MMHG

## 2021-09-20 DIAGNOSIS — Z3A.16 PREGNANCY WITH 16 COMPLETED WEEKS GESTATION: Primary | ICD-10-CM

## 2021-09-20 PROCEDURE — PNV: Performed by: OBSTETRICS & GYNECOLOGY

## 2021-09-20 NOTE — PROGRESS NOTES
Patient reports good fm, no n/v, bleeding, loss of fluid, edema, dom violence, or smoking  izaiah pnv feeling better, occasional headache resolves with tylenol, awaiting nipt done with pnc, given slip for afp today return in 4 weeks or sooner as needed

## 2021-09-21 ENCOUNTER — TELEPHONE (OUTPATIENT)
Dept: PERINATAL CARE | Facility: OTHER | Age: 41
End: 2021-09-21

## 2021-09-21 NOTE — TELEPHONE ENCOUNTER
----- Message from Vicente Orozco MD sent at 2021  1:49 PM EDT -----  Hi  RN staff, I've reviewed this NIPT result which shows low residual risk for the conditions tested (trisomies 13, 18, and 21, and sex chromosome abnormality), can you call her to inform her of this result? Her OB office is to order the MSAFP  and I sent her a [employer] message as an FYI  Thank you    Vicente Orozco MD

## 2021-09-21 NOTE — TELEPHONE ENCOUNTER
Phone call to patient  L/M reporting NIPT results screen negative  Advised patient if they wish to learn the sex of the baby to view it in [address] or call the office and that fetal sex will be confirmed at the time of Level 2 ultrasound  MSAFP to be ordered by OB office

## 2021-10-18 ENCOUNTER — TELEPHONE (OUTPATIENT)
Dept: PERINATAL CARE | Facility: CLINIC | Age: 41
End: 2021-10-18

## 2021-10-19 ENCOUNTER — ROUTINE PRENATAL (OUTPATIENT)
Dept: PERINATAL CARE | Facility: CLINIC | Age: 41
End: 2021-10-19
Payer: COMMERCIAL

## 2021-10-19 VITALS
DIASTOLIC BLOOD PRESSURE: 53 MMHG | SYSTOLIC BLOOD PRESSURE: 105 MMHG | HEIGHT: 68 IN | BODY MASS INDEX: 28.79 KG/M2 | HEART RATE: 61 BPM | WEIGHT: 190 LBS

## 2021-10-19 DIAGNOSIS — Z36.86 ENCOUNTER FOR ANTENATAL SCREENING FOR CERVICAL LENGTH: ICD-10-CM

## 2021-10-19 DIAGNOSIS — O09.522 MULTIGRAVIDA OF ADVANCED MATERNAL AGE IN SECOND TRIMESTER: Primary | ICD-10-CM

## 2021-10-19 DIAGNOSIS — Z3A.20 20 WEEKS GESTATION OF PREGNANCY: ICD-10-CM

## 2021-10-19 PROCEDURE — 76817 TRANSVAGINAL US OBSTETRIC: CPT | Performed by: OBSTETRICS & GYNECOLOGY

## 2021-10-19 PROCEDURE — 76811 OB US DETAILED SNGL FETUS: CPT | Performed by: OBSTETRICS & GYNECOLOGY

## 2021-10-19 PROCEDURE — 99213 OFFICE O/P EST LOW 20 MIN: CPT | Performed by: OBSTETRICS & GYNECOLOGY

## 2021-10-27 ENCOUNTER — ROUTINE PRENATAL (OUTPATIENT)
Dept: OBGYN CLINIC | Facility: CLINIC | Age: 41
End: 2021-10-27
Payer: COMMERCIAL

## 2021-10-27 VITALS
WEIGHT: 193 LBS | SYSTOLIC BLOOD PRESSURE: 114 MMHG | DIASTOLIC BLOOD PRESSURE: 72 MMHG | HEIGHT: 68 IN | BODY MASS INDEX: 29.25 KG/M2

## 2021-10-27 DIAGNOSIS — Z23 NEED FOR INFLUENZA VACCINATION: ICD-10-CM

## 2021-10-27 DIAGNOSIS — Z3A.21 PREGNANCY WITH 21 COMPLETED WEEKS GESTATION: Primary | ICD-10-CM

## 2021-10-27 PROCEDURE — 90471 IMMUNIZATION ADMIN: CPT | Performed by: OBSTETRICS & GYNECOLOGY

## 2021-10-27 PROCEDURE — PNV: Performed by: OBSTETRICS & GYNECOLOGY

## 2021-10-27 PROCEDURE — 90686 IIV4 VACC NO PRSV 0.5 ML IM: CPT | Performed by: OBSTETRICS & GYNECOLOGY

## 2021-12-17 ENCOUNTER — ROUTINE PRENATAL (OUTPATIENT)
Dept: OBGYN CLINIC | Facility: CLINIC | Age: 41
End: 2021-12-17

## 2021-12-17 VITALS
SYSTOLIC BLOOD PRESSURE: 120 MMHG | WEIGHT: 206 LBS | BODY MASS INDEX: 31.22 KG/M2 | DIASTOLIC BLOOD PRESSURE: 74 MMHG | HEIGHT: 68 IN

## 2021-12-17 DIAGNOSIS — Z3A.29 PREGNANCY WITH 29 COMPLETED WEEKS GESTATION: Primary | ICD-10-CM

## 2021-12-17 PROCEDURE — PNV: Performed by: OBSTETRICS & GYNECOLOGY

## 2021-12-22 ENCOUNTER — TELEPHONE (OUTPATIENT)
Dept: OBGYN CLINIC | Facility: CLINIC | Age: 41
End: 2021-12-22

## 2021-12-22 LAB
BASOPHILS # BLD AUTO: 18 CELLS/UL (ref 0–200)
BASOPHILS NFR BLD AUTO: 0.2 %
EOSINOPHIL # BLD AUTO: 97 CELLS/UL (ref 15–500)
EOSINOPHIL NFR BLD AUTO: 1.1 %
ERYTHROCYTE [DISTWIDTH] IN BLOOD BY AUTOMATED COUNT: 11.8 % (ref 11–15)
GLUCOSE 1H P 50 G GLC PO SERPL-MCNC: 140 MG/DL
HCT VFR BLD AUTO: 32.7 % (ref 35–45)
HGB BLD-MCNC: 11.2 G/DL (ref 11.7–15.5)
LYMPHOCYTES # BLD AUTO: 1505 CELLS/UL (ref 850–3900)
LYMPHOCYTES NFR BLD AUTO: 17.1 %
MCH RBC QN AUTO: 32.1 PG (ref 27–33)
MCHC RBC AUTO-ENTMCNC: 34.3 G/DL (ref 32–36)
MCV RBC AUTO: 93.7 FL (ref 80–100)
MONOCYTES # BLD AUTO: 361 CELLS/UL (ref 200–950)
MONOCYTES NFR BLD AUTO: 4.1 %
NEUTROPHILS # BLD AUTO: 6820 CELLS/UL (ref 1500–7800)
NEUTROPHILS NFR BLD AUTO: 77.5 %
PLATELET # BLD AUTO: 202 THOUSAND/UL (ref 140–400)
PMV BLD REES-ECKER: 9.9 FL (ref 7.5–12.5)
RBC # BLD AUTO: 3.49 MILLION/UL (ref 3.8–5.1)
RPR SER QL: NORMAL
WBC # BLD AUTO: 8.8 THOUSAND/UL (ref 3.8–10.8)

## 2022-01-05 ENCOUNTER — ROUTINE PRENATAL (OUTPATIENT)
Dept: OBGYN CLINIC | Facility: CLINIC | Age: 42
End: 2022-01-05

## 2022-01-05 VITALS — WEIGHT: 209 LBS | SYSTOLIC BLOOD PRESSURE: 102 MMHG | BODY MASS INDEX: 31.78 KG/M2 | DIASTOLIC BLOOD PRESSURE: 68 MMHG

## 2022-01-05 DIAGNOSIS — Z34.83 PRENATAL CARE, SUBSEQUENT PREGNANCY, THIRD TRIMESTER: Primary | ICD-10-CM

## 2022-01-05 PROCEDURE — PNV: Performed by: PHYSICIAN ASSISTANT

## 2022-01-05 NOTE — PROGRESS NOTES
Visit: Good FM, Reports cramping more menstrual like  Having more pelvic floor weakness and pain  Saw pelvic floor physical therapy in last pregnancy and has been trying to do exercises  No N/V, HA, VB, LOF, edema, domestic violence, or smoking  Tolerating PNV  Got COVID booster on 12/29/2021  Will plan Tdap next visit  30 wk packet given  Continue routine prenatal care  Return to office in 2 weeks for ob check

## 2022-01-11 ENCOUNTER — ULTRASOUND (OUTPATIENT)
Dept: PERINATAL CARE | Facility: CLINIC | Age: 42
End: 2022-01-11
Payer: COMMERCIAL

## 2022-01-11 VITALS
BODY MASS INDEX: 32.4 KG/M2 | HEIGHT: 68 IN | DIASTOLIC BLOOD PRESSURE: 56 MMHG | HEART RATE: 87 BPM | WEIGHT: 213.8 LBS | SYSTOLIC BLOOD PRESSURE: 110 MMHG

## 2022-01-11 DIAGNOSIS — O09.523 MULTIGRAVIDA OF ADVANCED MATERNAL AGE IN THIRD TRIMESTER: Primary | ICD-10-CM

## 2022-01-11 DIAGNOSIS — Z3A.32 32 WEEKS GESTATION OF PREGNANCY: ICD-10-CM

## 2022-01-11 PROCEDURE — 76816 OB US FOLLOW-UP PER FETUS: CPT | Performed by: OBSTETRICS & GYNECOLOGY

## 2022-01-11 PROCEDURE — 99212 OFFICE O/P EST SF 10 MIN: CPT | Performed by: OBSTETRICS & GYNECOLOGY

## 2022-01-11 NOTE — PROGRESS NOTES
The patient was seen today for an ultrasound  Please see ultrasound report (located under Ob Procedures) for additional details  Thank you very much for allowing us to participate in the care of this very nice patient  Should you have any questions, please do not hesitate to contact me  MD Zheng Almeidaucah  Attending Physician, [place]

## 2022-01-12 ENCOUNTER — ROUTINE PRENATAL (OUTPATIENT)
Dept: OBGYN CLINIC | Facility: CLINIC | Age: 42
End: 2022-01-12
Payer: COMMERCIAL

## 2022-01-12 VITALS
DIASTOLIC BLOOD PRESSURE: 78 MMHG | WEIGHT: 212 LBS | BODY MASS INDEX: 32.13 KG/M2 | HEIGHT: 68 IN | SYSTOLIC BLOOD PRESSURE: 122 MMHG

## 2022-01-12 DIAGNOSIS — Z3A.32 32 WEEKS GESTATION OF PREGNANCY: ICD-10-CM

## 2022-01-12 DIAGNOSIS — N89.8 VAGINAL ODOR: ICD-10-CM

## 2022-01-12 DIAGNOSIS — Z23 NEED FOR TDAP VACCINATION: ICD-10-CM

## 2022-01-12 DIAGNOSIS — Z34.83 ENCOUNTER FOR SUPERVISION OF NORMAL PREGNANCY IN MULTIGRAVIDA IN THIRD TRIMESTER: Primary | ICD-10-CM

## 2022-01-12 LAB
BV WHIFF TEST VAG QL: NORMAL
CLUE CELLS SPEC QL WET PREP: NORMAL
PH SMN: 4.5 [PH]
SL AMB POCT WET MOUNT: NORMAL
T VAGINALIS VAG QL WET PREP: NORMAL
YEAST VAG QL WET PREP: NORMAL

## 2022-01-12 PROCEDURE — 90715 TDAP VACCINE 7 YRS/> IM: CPT

## 2022-01-12 PROCEDURE — PNV: Performed by: PHYSICIAN ASSISTANT

## 2022-01-12 PROCEDURE — 87070 CULTURE OTHR SPECIMN AEROBIC: CPT | Performed by: PHYSICIAN ASSISTANT

## 2022-01-12 PROCEDURE — 87210 SMEAR WET MOUNT SALINE/INK: CPT | Performed by: PHYSICIAN ASSISTANT

## 2022-01-12 PROCEDURE — 90471 IMMUNIZATION ADMIN: CPT

## 2022-01-12 NOTE — PROGRESS NOTES
VISIT: Patient presents today for a possible vaginal infection - for the past 2 weeks has noticed a slight fishy odor that comes and goes; Describes discharge as slightly more yellow; Denies any vulvar itch/burn; no vaginal itch/burn; Seems to be a little less at this time but hasn't tried anything OTC; mutually monog/ and declines sti cultures  Occasional HA and cramping; Denies n/v/vb/lof/edema/dv/smoking; urine trace/neg - encourage hydration  32 week growth done - no follow-up indicated  PNVs + DHA - tolerating daily  Good FM - r/keyla 10 kicks/2 hrs  Tdap - reviewed and encouraged - administered today without issue; Flu done 10/27/21; COVID done 11/21 and 12/29    Vaginal speculum exam unremarkable - small amount of thin white discharge on vaginal walls; genital culture collected; Wet mount unremarkable - no yeast or clue cells noted; No bleeding prior to examination - after cultures collected cervix did significantly bleed - causing a pooling of blood - cleaned for patient and reviewed what to expect and when to call    Reviewed COVID and pregnancy - considered high risk for severe infection - encourage adequate social distancing and masking  RTO for scheduled routine ob check or sooner if needed

## 2022-01-16 LAB — BACTERIA GENITAL AEROBE CULT: NORMAL

## 2022-01-21 ENCOUNTER — ROUTINE PRENATAL (OUTPATIENT)
Dept: OBGYN CLINIC | Facility: CLINIC | Age: 42
End: 2022-01-21

## 2022-01-21 VITALS
BODY MASS INDEX: 32.95 KG/M2 | SYSTOLIC BLOOD PRESSURE: 132 MMHG | WEIGHT: 217.4 LBS | HEIGHT: 68 IN | DIASTOLIC BLOOD PRESSURE: 66 MMHG

## 2022-01-21 DIAGNOSIS — Z3A.34 PREGNANCY WITH 34 COMPLETED WEEKS GESTATION: Primary | ICD-10-CM

## 2022-01-21 PROCEDURE — PNV: Performed by: OBSTETRICS & GYNECOLOGY

## 2022-01-21 NOTE — PROGRESS NOTES
Patient reports good fm, no n/v, bleeding, loss of fluid, edema, dom violence, or smoking  izaiah pnv some headaches and cramping had cut caffeine 2 half caff discussed and reviewed  A lot of hip discomfort difficult to rest   Baby feels large to patient and recent ultrasound confirms  Patient would like to consider IOL at 39 weeks due to concerns of LGA    Return in 2 weeks for OB check and GBS sooner as needed

## 2022-01-24 ENCOUNTER — TELEPHONE (OUTPATIENT)
Dept: PERINATAL CARE | Facility: CLINIC | Age: 42
End: 2022-01-24

## 2022-01-24 ENCOUNTER — TELEPHONE (OUTPATIENT)
Dept: OBGYN CLINIC | Facility: CLINIC | Age: 42
End: 2022-01-24

## 2022-01-24 NOTE — TELEPHONE ENCOUNTER
Patient requested a referral to [facility] due to suspected growth issues  States baby's head is measuring large   New referral added per pt request

## 2022-01-24 NOTE — TELEPHONE ENCOUNTER
Pt called office today and stated she spoke with her OB and would like additional ultrasound to check the size of the baby  I informed pt that her last appointment Dr Saniya Finley stated "to return as clinically indicated"  I informed pt that she should reach out to her OB and if they would like another ultrasound to please contact her OB to place another referral   PT receptive and declines further questions at this time

## 2022-01-25 ENCOUNTER — TELEPHONE (OUTPATIENT)
Dept: OBGYN CLINIC | Facility: CLINIC | Age: 42
End: 2022-01-25

## 2022-01-25 NOTE — TELEPHONE ENCOUNTER
Patient called in to cancel ob check for 2/1  Patient unable to make apt and has a apt with MFM that week  Does not wish to reschedule, will keep apt in office the following week  Aware to call if needs anything

## 2022-02-04 ENCOUNTER — ULTRASOUND (OUTPATIENT)
Dept: PERINATAL CARE | Facility: CLINIC | Age: 42
End: 2022-02-04
Payer: COMMERCIAL

## 2022-02-04 VITALS
WEIGHT: 223.8 LBS | SYSTOLIC BLOOD PRESSURE: 123 MMHG | HEART RATE: 96 BPM | BODY MASS INDEX: 33.92 KG/M2 | HEIGHT: 68 IN | DIASTOLIC BLOOD PRESSURE: 69 MMHG

## 2022-02-04 DIAGNOSIS — Z03.74 FETAL GROWTH PROBLEM SUSPECTED BUT NOT FOUND: Primary | ICD-10-CM

## 2022-02-04 DIAGNOSIS — Z3A.34 34 WEEKS GESTATION OF PREGNANCY: ICD-10-CM

## 2022-02-04 DIAGNOSIS — Z3A.36 36 WEEKS GESTATION OF PREGNANCY: ICD-10-CM

## 2022-02-04 PROCEDURE — 76816 OB US FOLLOW-UP PER FETUS: CPT | Performed by: OBSTETRICS & GYNECOLOGY

## 2022-02-04 PROCEDURE — 99212 OFFICE O/P EST SF 10 MIN: CPT | Performed by: OBSTETRICS & GYNECOLOGY

## 2022-02-04 NOTE — PROGRESS NOTES
The patient was seen today for an ultrasound  Please see ultrasound report (located under Ob Procedures) for additional details  Thank you very much for allowing us to participate in the care of this very nice patient  Should you have any questions, please do not hesitate to contact me  Nixon Rao MD [address]  Attending Physician, [place]

## 2022-02-11 ENCOUNTER — ROUTINE PRENATAL (OUTPATIENT)
Dept: OBGYN CLINIC | Facility: CLINIC | Age: 42
End: 2022-02-11

## 2022-02-11 VITALS — DIASTOLIC BLOOD PRESSURE: 76 MMHG | WEIGHT: 225 LBS | BODY MASS INDEX: 34.21 KG/M2 | SYSTOLIC BLOOD PRESSURE: 120 MMHG

## 2022-02-11 DIAGNOSIS — Z3A.37 37 WEEKS GESTATION OF PREGNANCY: Primary | ICD-10-CM

## 2022-02-11 DIAGNOSIS — Z34.83 PRENATAL CARE, SUBSEQUENT PREGNANCY, THIRD TRIMESTER: ICD-10-CM

## 2022-02-11 PROCEDURE — PNV: Performed by: OBSTETRICS & GYNECOLOGY

## 2022-02-11 PROCEDURE — 87150 DNA/RNA AMPLIFIED PROBE: CPT | Performed by: OBSTETRICS & GYNECOLOGY

## 2022-02-11 NOTE — PROGRESS NOTES
Visit:  Good FM - aware LGA and is concerned as feels bigger than other babies - did discuss primary c/s with [facility] and is considering but wants at least IOL at 39 weeks at this point - urine neg/ neg

## 2022-02-14 ENCOUNTER — TELEPHONE (OUTPATIENT)
Dept: OBGYN CLINIC | Facility: CLINIC | Age: 42
End: 2022-02-14

## 2022-02-14 LAB — GP B STREP DNA SPEC QL NAA+PROBE: POSITIVE

## 2022-02-14 NOTE — TELEPHONE ENCOUNTER
----- Message from Arcelia Barraza MD sent at 2/11/2022  2:34 PM EST -----  Schedule for friday 2/25 for iol due to macrosomia

## 2022-02-15 ENCOUNTER — TELEPHONE (OUTPATIENT)
Dept: OBGYN CLINIC | Facility: CLINIC | Age: 42
End: 2022-02-15

## 2022-02-15 NOTE — TELEPHONE ENCOUNTER
Patient called, lost mucous plug yesterday and just wants to see if anything she should be watching for  Wants to be sure ok that she hasnt gone into labor yet   This is her 5th baby

## 2022-02-15 NOTE — TELEPHONE ENCOUNTER
Reviewed with patient, aware mucous plug doesn't always indicate start of labor  To continue to monitor for contractions or if water breaks  Any questions to reach out and let us know

## 2022-02-17 ENCOUNTER — ROUTINE PRENATAL (OUTPATIENT)
Dept: OBGYN CLINIC | Facility: CLINIC | Age: 42
End: 2022-02-17

## 2022-02-17 VITALS — SYSTOLIC BLOOD PRESSURE: 118 MMHG | BODY MASS INDEX: 33.75 KG/M2 | DIASTOLIC BLOOD PRESSURE: 80 MMHG | WEIGHT: 222 LBS

## 2022-02-17 DIAGNOSIS — Z3A.37 37 WEEKS GESTATION OF PREGNANCY: Primary | ICD-10-CM

## 2022-02-17 PROCEDURE — PNV: Performed by: PHYSICIAN ASSISTANT

## 2022-02-17 NOTE — PROGRESS NOTES
Visit: Good FM, reports changing not as strong as baby is getting larger  Reviewed fetal kick counts 10 kicks in 2 hrs  Patient reports has not honestly been counting will trial kick counts today and aware to call if less than 10 in 2 hrs  Swelling in lower legs and hands  No N/V, HA, cramping, VB, LOF, domestic violence, or smoking  Tolerating PNV  Labor Talk done would like epidural, reviewed signs of labor, checking in visit done, how to call, has car seat  Cervix 2/60/-1  GBS positive  IOL scheduled for 2/25  Continue routine prenatal care  Return to office in 1 week for ob check

## 2022-02-25 ENCOUNTER — HOSPITAL ENCOUNTER (INPATIENT)
Facility: HOSPITAL | Age: 42
LOS: 2 days | Discharge: HOME | End: 2022-02-27
Attending: OBSTETRICS & GYNECOLOGY | Admitting: OBSTETRICS & GYNECOLOGY
Payer: COMMERCIAL

## 2022-02-25 ENCOUNTER — ANESTHESIA EVENT (INPATIENT)
Dept: ANESTHESIOLOGY | Facility: HOSPITAL | Age: 42
End: 2022-02-25
Payer: COMMERCIAL

## 2022-02-25 ENCOUNTER — HOSPITAL ENCOUNTER (OUTPATIENT)
Dept: LABOR AND DELIVERY | Facility: HOSPITAL | Age: 42
Discharge: HOME | End: 2022-02-25
Payer: COMMERCIAL

## 2022-02-25 ENCOUNTER — ANESTHESIA (INPATIENT)
Dept: ANESTHESIOLOGY | Facility: HOSPITAL | Age: 42
End: 2022-02-25
Payer: COMMERCIAL

## 2022-02-25 DIAGNOSIS — Z3A.39 39 WEEKS GESTATION OF PREGNANCY: Primary | ICD-10-CM

## 2022-02-25 PROBLEM — O09.293 H/O POSTPARTUM HEMORRHAGE, CURRENTLY PREGNANT, THIRD TRIMESTER: Status: ACTIVE | Noted: 2022-02-25

## 2022-02-25 LAB
ABO GROUP BLD: NORMAL
BASE EXCESS BLDCOA CALC-SCNC: -2.1 MMOL/L (ref 3–11)
BASE EXCESS BLDCOV CALC-SCNC: -2.9 MMOL/L (ref 1–9)
BASOPHILS # BLD AUTO: 0.01 THOUSANDS/ΜL (ref 0–0.1)
BASOPHILS NFR BLD AUTO: 0 % (ref 0–1)
BLD GP AB SCN SERPL QL: NEGATIVE
EOSINOPHIL # BLD AUTO: 0.07 THOUSAND/ΜL (ref 0–0.61)
EOSINOPHIL NFR BLD AUTO: 1 % (ref 0–6)
ERYTHROCYTE [DISTWIDTH] IN BLOOD BY AUTOMATED COUNT: 13.4 % (ref 11.6–15.1)
HCO3 BLDCOA-SCNC: 26.1 MMOL/L (ref 17.3–27.3)
HCO3 BLDCOV-SCNC: 21.8 MMOL/L (ref 12.2–28.6)
HCT VFR BLD AUTO: 35.5 % (ref 34.8–46.1)
HGB BLD-MCNC: 12 G/DL (ref 11.5–15.4)
IMM GRANULOCYTES # BLD AUTO: 0.07 THOUSAND/UL (ref 0–0.2)
IMM GRANULOCYTES NFR BLD AUTO: 1 % (ref 0–2)
LYMPHOCYTES # BLD AUTO: 1.67 THOUSANDS/ΜL (ref 0.6–4.47)
LYMPHOCYTES NFR BLD AUTO: 17 % (ref 14–44)
MCH RBC QN AUTO: 32.2 PG (ref 26.8–34.3)
MCHC RBC AUTO-ENTMCNC: 33.8 G/DL (ref 31.4–37.4)
MCV RBC AUTO: 95 FL (ref 82–98)
MONOCYTES # BLD AUTO: 0.67 THOUSAND/ΜL (ref 0.17–1.22)
MONOCYTES NFR BLD AUTO: 7 % (ref 4–12)
NEUTROPHILS # BLD AUTO: 7.45 THOUSANDS/ΜL (ref 1.85–7.62)
NEUTS SEG NFR BLD AUTO: 74 % (ref 43–75)
NRBC BLD AUTO-RTO: 0 /100 WBCS
O2 CT VFR BLDCOA CALC: 3.7 ML/DL
OXYHGB MFR BLDCOA: 15.7 %
OXYHGB MFR BLDCOV: 53 %
PCO2 BLDCOA: 58.2 MM[HG] (ref 30–60)
PCO2 BLDCOV: 38 MM HG (ref 27–43)
PH BLDCOA: 7.27 [PH] (ref 7.23–7.43)
PH BLDCOV: 7.38 [PH] (ref 7.19–7.49)
PLATELET # BLD AUTO: 220 THOUSANDS/UL (ref 149–390)
PMV BLD AUTO: 9.9 FL (ref 8.9–12.7)
PO2 BLDCOA: 12.2 MM HG (ref 5–25)
PO2 BLDCOV: 21.8 MM HG (ref 15–45)
RBC # BLD AUTO: 3.73 MILLION/UL (ref 3.81–5.12)
RH BLD: POSITIVE
RPR SER QL: NORMAL
SAO2 % BLDCOV: 12 ML/DL
SPECIMEN EXPIRATION DATE: NORMAL
WBC # BLD AUTO: 9.94 THOUSAND/UL (ref 4.31–10.16)

## 2022-02-25 PROCEDURE — 86850 RBC ANTIBODY SCREEN: CPT

## 2022-02-25 PROCEDURE — 85025 COMPLETE CBC W/AUTO DIFF WBC: CPT

## 2022-02-25 PROCEDURE — 3E033VJ INTRODUCTION OF OTHER HORMONE INTO PERIPHERAL VEIN, PERCUTANEOUS APPROACH: ICD-10-PCS | Performed by: OBSTETRICS & GYNECOLOGY

## 2022-02-25 PROCEDURE — 82805 BLOOD GASES W/O2 SATURATION: CPT | Performed by: OBSTETRICS & GYNECOLOGY

## 2022-02-25 PROCEDURE — 86592 SYPHILIS TEST NON-TREP QUAL: CPT

## 2022-02-25 PROCEDURE — 10907ZC DRAINAGE OF AMNIOTIC FLUID, THERAPEUTIC FROM PRODUCTS OF CONCEPTION, VIA NATURAL OR ARTIFICIAL OPENING: ICD-10-PCS | Performed by: OBSTETRICS & GYNECOLOGY

## 2022-02-25 PROCEDURE — 99024 POSTOP FOLLOW-UP VISIT: CPT | Performed by: OBSTETRICS & GYNECOLOGY

## 2022-02-25 PROCEDURE — 86901 BLOOD TYPING SEROLOGIC RH(D): CPT

## 2022-02-25 PROCEDURE — 4A1HXCZ MONITORING OF PRODUCTS OF CONCEPTION, CARDIAC RATE, EXTERNAL APPROACH: ICD-10-PCS | Performed by: OBSTETRICS & GYNECOLOGY

## 2022-02-25 PROCEDURE — NC001 PR NO CHARGE: Performed by: OBSTETRICS & GYNECOLOGY

## 2022-02-25 PROCEDURE — 59400 OBSTETRICAL CARE: CPT | Performed by: OBSTETRICS & GYNECOLOGY

## 2022-02-25 PROCEDURE — 86900 BLOOD TYPING SEROLOGIC ABO: CPT

## 2022-02-25 RX ORDER — CALCIUM CARBONATE 200(500)MG
1000 TABLET,CHEWABLE ORAL DAILY PRN
Status: DISCONTINUED | OUTPATIENT
Start: 2022-02-25 | End: 2022-02-25

## 2022-02-25 RX ORDER — DOCUSATE SODIUM 100 MG/1
100 CAPSULE, LIQUID FILLED ORAL 2 TIMES DAILY
Status: DISCONTINUED | OUTPATIENT
Start: 2022-02-25 | End: 2022-02-27 | Stop reason: HOSPADM

## 2022-02-25 RX ORDER — OXYTOCIN/RINGER'S LACTATE 30/500 ML
250 PLASTIC BAG, INJECTION (ML) INTRAVENOUS ONCE
Status: COMPLETED | OUTPATIENT
Start: 2022-02-25 | End: 2022-02-25

## 2022-02-25 RX ORDER — ONDANSETRON 2 MG/ML
4 INJECTION INTRAMUSCULAR; INTRAVENOUS EVERY 6 HOURS PRN
Status: DISCONTINUED | OUTPATIENT
Start: 2022-02-25 | End: 2022-02-25

## 2022-02-25 RX ORDER — LIDOCAINE HYDROCHLORIDE AND EPINEPHRINE 15; 5 MG/ML; UG/ML
INJECTION, SOLUTION EPIDURAL
Status: COMPLETED | OUTPATIENT
Start: 2022-02-25 | End: 2022-02-25

## 2022-02-25 RX ORDER — ROPIVACAINE HYDROCHLORIDE 2 MG/ML
INJECTION, SOLUTION EPIDURAL; INFILTRATION; PERINEURAL CONTINUOUS PRN
Status: DISCONTINUED | OUTPATIENT
Start: 2022-02-25 | End: 2022-03-01 | Stop reason: HOSPADM

## 2022-02-25 RX ORDER — OXYTOCIN/RINGER'S LACTATE 30/500 ML
1-30 PLASTIC BAG, INJECTION (ML) INTRAVENOUS
Status: DISCONTINUED | OUTPATIENT
Start: 2022-02-25 | End: 2022-02-25

## 2022-02-25 RX ORDER — IBUPROFEN 600 MG/1
600 TABLET ORAL EVERY 6 HOURS
Status: DISCONTINUED | OUTPATIENT
Start: 2022-02-25 | End: 2022-02-27 | Stop reason: HOSPADM

## 2022-02-25 RX ORDER — ACETAMINOPHEN 325 MG/1
650 TABLET ORAL EVERY 6 HOURS PRN
Status: DISCONTINUED | OUTPATIENT
Start: 2022-02-25 | End: 2022-02-25

## 2022-02-25 RX ORDER — DIAPER,BRIEF,INFANT-TODD,DISP
1 EACH MISCELLANEOUS DAILY PRN
Status: DISCONTINUED | OUTPATIENT
Start: 2022-02-25 | End: 2022-02-27 | Stop reason: HOSPADM

## 2022-02-25 RX ORDER — SODIUM CHLORIDE, SODIUM LACTATE, POTASSIUM CHLORIDE, CALCIUM CHLORIDE 600; 310; 30; 20 MG/100ML; MG/100ML; MG/100ML; MG/100ML
125 INJECTION, SOLUTION INTRAVENOUS CONTINUOUS
Status: DISCONTINUED | OUTPATIENT
Start: 2022-02-25 | End: 2022-02-25

## 2022-02-25 RX ORDER — ACETAMINOPHEN 325 MG/1
650 TABLET ORAL EVERY 4 HOURS PRN
Status: DISCONTINUED | OUTPATIENT
Start: 2022-02-25 | End: 2022-02-27 | Stop reason: HOSPADM

## 2022-02-25 RX ORDER — ONDANSETRON 2 MG/ML
4 INJECTION INTRAMUSCULAR; INTRAVENOUS EVERY 8 HOURS PRN
Status: DISCONTINUED | OUTPATIENT
Start: 2022-02-25 | End: 2022-02-27 | Stop reason: HOSPADM

## 2022-02-25 RX ORDER — CALCIUM CARBONATE 200(500)MG
1000 TABLET,CHEWABLE ORAL DAILY PRN
Status: DISCONTINUED | OUTPATIENT
Start: 2022-02-25 | End: 2022-02-27 | Stop reason: HOSPADM

## 2022-02-25 RX ADMIN — ACETAMINOPHEN 650 MG: 325 TABLET, FILM COATED ORAL at 21:01

## 2022-02-25 RX ADMIN — DOCUSATE SODIUM 100 MG: 100 CAPSULE ORAL at 18:04

## 2022-02-25 RX ADMIN — SODIUM CHLORIDE 5 MILLION UNITS: 0.9 INJECTION, SOLUTION INTRAVENOUS at 09:34

## 2022-02-25 RX ADMIN — LIDOCAINE HYDROCHLORIDE AND EPINEPHRINE 3 ML: 15; 5 INJECTION, SOLUTION EPIDURAL at 12:36

## 2022-02-25 RX ADMIN — LIDOCAINE HYDROCHLORIDE AND EPINEPHRINE 2 ML: 15; 5 INJECTION, SOLUTION EPIDURAL at 12:40

## 2022-02-25 RX ADMIN — ROPIVACAINE HYDROCHLORIDE 10 ML/HR: 2 INJECTION, SOLUTION EPIDURAL; INFILTRATION at 12:42

## 2022-02-25 RX ADMIN — Medication: at 13:00

## 2022-02-25 RX ADMIN — SODIUM CHLORIDE, SODIUM LACTATE, POTASSIUM CHLORIDE, AND CALCIUM CHLORIDE 125 ML/HR: .6; .31; .03; .02 INJECTION, SOLUTION INTRAVENOUS at 13:01

## 2022-02-25 RX ADMIN — Medication 2 MILLI-UNITS/MIN: at 09:42

## 2022-02-25 RX ADMIN — SODIUM CHLORIDE 2.5 MILLION UNITS: 9 INJECTION, SOLUTION INTRAVENOUS at 13:29

## 2022-02-25 RX ADMIN — IBUPROFEN 600 MG: 600 TABLET ORAL at 19:13

## 2022-02-25 RX ADMIN — SODIUM CHLORIDE, SODIUM LACTATE, POTASSIUM CHLORIDE, AND CALCIUM CHLORIDE 125 ML/HR: .6; .31; .03; .02 INJECTION, SOLUTION INTRAVENOUS at 09:26

## 2022-02-25 RX ADMIN — Medication 1 APPLICATION: at 19:16

## 2022-02-25 RX ADMIN — Medication 250 MILLI-UNITS/MIN: at 16:39

## 2022-02-25 RX ADMIN — ROPIVACAINE HYDROCHLORIDE 100 ML: 2 INJECTION, SOLUTION EPIDURAL; INFILTRATION at 13:00

## 2022-02-25 NOTE — H&P
H&P Exam - Obstetrics   Jason Bain 46 y o  female MRN: [id number]  Unit/Bed#: [facility] Encounter: [id number]      History of Present Illness     Chief Complaint: Induction of labor    HPI:  Jason Bain is a 46 y o  [id number] female with an NITIN of 3/4/2022, by Last Menstrual Period at 39w0d weeks gestation who is being admitted for elective induction of labor with suspected large for gestational age fetus  Contractions: denies  Loss of fluid: denies  Vaginal bleeding: denies  Fetal movement: present    She is a CFW patient       PREGNANCY COMPLICATIONS:   1) Pregnancy at 39w0d  2) Suspected LGA  3) GBS positive status  4) H/o PPH  5) FHx of mitochondrial deletion syndrome    OB History    Para Term  AB Living   8 3 2 1 4 4   SAB IAB Ectopic Multiple Live Births   2     1 4      # Outcome Date GA Lbr Jam/2nd Weight Sex Delivery Anes PTL Lv   8 Current            7A  17 36w0d / 00:02 2960 g (6 lb 8 4 oz) M Vag-Spont EPI Y TIFF   7B  17 36w0d / 00:22 2635 g (5 lb 13 oz) M Vag-Spont EPI Y TIFF   6 2016           5 Term 14 40w0d  3629 g (8 lb) F Vag-Spont EPI  TIFF      Birth Comments: postpartum hemorrhage   4 Term 13 40w0d  4082 g (9 lb) F Vag-Spont EPI  TIFF   3 SAB            2 AB 2007           1 AB                Baby complications/comments: LGA    ROS:  Constitutional: Negative  Respiratory: Negative  Cardiovascular: Negative    Gastrointestinal: Negative    Historical Information   Past Medical History:   Diagnosis Date    Abnormal Pap smear of cervix     Ear problems     Heartburn during pregnancy     Migraine     Palpitations     Varicella      Past Surgical History:   Procedure Laterality Date    BREAST CYST ASPIRATION Left 2014    cyst asp     DENTAL SURGERY      DILATION AND CURETTAGE OF UTERUS      NASAL SEPTUM SURGERY       Social History   Social History     Substance and Sexual Activity   Alcohol Use No     Social History Substance and Sexual Activity   Drug Use No     Social History     Tobacco Use   Smoking Status Never Smoker   Smokeless Tobacco Never Used     Family History:   Family History   Problem Relation Age of Onset   [de-identified] / Stillbirths Mother     Birth defects Sister     Drug abuse Paternal Uncle     Arthritis Maternal Grandmother     Cancer Maternal Grandmother     Hyperlipidemia Maternal Grandmother     Hypertension Maternal Grandmother     Cancer Maternal Grandfather     Hearing loss Maternal Grandfather     Vision loss Maternal Grandfather     Cancer Paternal Grandmother     Diabetes Paternal Grandmother     Hearing loss Paternal Grandmother     Hyperlipidemia Paternal Grandmother     Hypertension Paternal Grandmother     Alcohol abuse Paternal Grandfather     Cancer Paternal Grandfather     Mental illness Paternal Grandfather     Lung cancer Paternal Grandfather     Colon cancer Maternal Aunt        Meds/Allergies      Medications Prior to Admission   Medication    Prenatal Vit-Fe Fumarate-FA (PRENATAL VITAMIN PO)      No Known Allergies    OBJECTIVE:    Vitals: Last menstrual period 05/28/2021, unknown if currently breastfeeding  There is no height or weight on file to calculate BMI  Physical Exam  Vitals and nursing note reviewed  Exam conducted with a chaperone present  Constitutional:       General: She is not in acute distress  HENT:      Head: Normocephalic  Right Ear: External ear normal       Left Ear: External ear normal    Eyes:      General: No scleral icterus  Right eye: No discharge  Left eye: No discharge  Conjunctiva/sclera: Conjunctivae normal    Cardiovascular:      Rate and Rhythm: Normal rate  Pulses: Normal pulses  Pulmonary:      Effort: Pulmonary effort is normal  No respiratory distress  Abdominal:      Palpations: Abdomen is soft  Tenderness: There is no abdominal tenderness  There is no guarding        Comments: Gravid uterus   Genitourinary:     General: Normal vulva  Musculoskeletal:         General: No swelling or tenderness  Normal range of motion  Cervical back: Normal range of motion  Right lower leg: No edema  Left lower leg: No edema  Skin:     General: Skin is warm and dry  Capillary Refill: Capillary refill takes less than 2 seconds  Neurological:      Mental Status: She is alert and oriented to person, place, and time  Mental status is at baseline     Psychiatric:         Mood and Affect: Mood normal          Behavior: Behavior normal          Fetus confirmed to be in vertex presentation by transabdominal ultrasound on admission    Cervix:   2-3/50/-3    Fetal heart rate:    145 bpm baseline, moderate variability, 15x15 accelerations, no decelerations    [place]:    irritability    EFW: EFW 7lb4oz 90% AC >97% HC >95%    GBS: positive    Prenatal Labs:   Blood Type:   Lab Results   Component Value Date/Time    ABO Grouping AB 07/29/2021 01:48 PM    ABO Grouping AB 01/05/2017 12:46 PM     , D (Rh type):   Lab Results   Component Value Date/Time    Rh Factor Positive 07/29/2021 01:48 PM    Rh Factor RH(D) POSITIVE 01/05/2017 12:46 PM    Rh Type RH(D) POSITIVE 09/11/2020 10:44 AM     , Antibody Screen: No results found for: ANTIBODYSCR , HCT/HGB:   Lab Results   Component Value Date/Time    HCT 32 7 (L) 12/21/2021 09:15 AM    Hematocrit 36 9 07/29/2021 01:48 PM    Hematocrit 32 3 (L) 04/21/2017 08:04 AM    Hemoglobin 11 2 (L) 12/21/2021 09:15 AM    Hemoglobin 12 8 07/29/2021 01:48 PM    Hemoglobin 10 8 (L) 04/21/2017 08:04 AM      , MCV:   Lab Results   Component Value Date/Time    MCV 93 7 12/21/2021 09:15 AM    MCV 94 07/29/2021 01:48 PM    MCV 97 4 04/21/2017 08:04 AM      , Platelets:   Lab Results   Component Value Date/Time    Platelet Count 608 77/04/1906 09:15 AM    Platelets 360 23/63/7047 01:48 PM    Platelets 245 19/78/8590 08:04 AM      , 1 hour Glucola:   Lab Results   Component Value Date/Time    GLUCOSE 1 HR 50 GM GLUC CHALLENGE-PREG  2017 08:04 AM    Glucose 140 (H) 2021 09:15 AM   , 3 hour GTT: No results found for: PVSEKUE0EL, Varicella: No results found for: VARICELLAIGG    , Rubella:   Lab Results   Component Value Date/Time    Rubella IgG Quant >175 0 2021 01:48 PM        , VDRL/RPR:   Lab Results   Component Value Date/Time    RPR SCREEN NON-REACTIVE 2017 12:46 PM    RPR SCREEN NON-REACTIVE 2017 12:46 PM    RPR NON-REACTIVE 2021 09:15 AM    RPR Non-Reactive 2021 01:48 PM      , Urine Culture/Screen:   Lab Results   Component Value Date/Time    Urine Culture No Growth <1000 cfu/mL 2021 01:48 PM       , Urine Drug Screen: No results found for: AMPHETUR, BARBTUR, BDZUR, THCUR, COCAINEUR, METHADONEUR, OPIATEUR, PCPUR, MTHAMUR, ECSTASYUR, TRICYCLICSUR, Hep B:   Lab Results   Component Value Date/Time    HEPATITIS B SURFACE ANTIGEN NON-REACTIVE 2017 12:46 PM    Hepatitis B Surface Ag Non-reactive 2021 01:48 PM     , Hep C: No components found for: HEPCSAG, EXTHEPCSAG   , HIV:   Lab Results   Component Value Date/Time    HIV-1/HIV-2 Ab Non-Reactive 2021 01:48 PM     , Chlamydia: No results found for: EXTCHLAMYDIA  , Gonorrhea:   Lab Results   Component Value Date/Time    N gonorrhoeae, DNA Probe Negative 2021 01:38 PM     , Group B Strep:    Lab Results   Component Value Date/Time    STREP GRP B, MOLECULAR NEGATIVE 2017 12:00 AM    Strep Grp B PCR Positive (A) 2022 02:54 PM          Invasive Devices  Report    Peripheral Intravenous Line            Peripheral IV 20 Right Antecubital 523 days                  Assessment/Plan     ASSESSMENT:  44yo [id number] at 39w0d weeks gestation who is being admitted for induction      PLAN:    - Admit  - CBC, RPR, Blood Type  - Start with pitocin  - GBS positive status: PCN for prophylaxis   - Analgesia and/or epidural at patient request  - Anticipate   - Contraception: desires micronor bridge to b/l salpingectomy    Discussed with Dr Marli Deleon      This patient will be an INPATIENT  and I certify the anticipated length of stay is >2 Midnights      Karla Navarrete MD  2/25/2022  8:21 AM

## 2022-02-25 NOTE — OB LABOR/OXYTOCIN SAFETY PROGRESS
Oxytocin Safety Progress Check Note - Frankey Height 46 y o  female MRN: [id number]    Unit/Bed#: -01 Encounter: [id number]    Dose (yarely-units/min) Oxytocin: 12 yarely-units/min  Contraction Frequency (minutes): 1 5-3  Contraction Quality: Strong  Tachysystole: No   Cervical Dilation: 10  Dilation Complete Date: 02/25/22  Dilation Complete Time: 1634  Cervical Effacement: 100  Fetal Station: 2  Baseline Rate: 140 bpm  Fetal Heart Rate: 152 BPM  FHR Category: Category I  Oxytocin Safety Progress Check: Safety check completed            Vital Signs:   Vitals:    02/25/22 1650   BP: 107/59   Pulse: 62   Resp:    Temp:    SpO2:            Notes/comments:        Ronak Collins MD 2/25/2022 4:54 PM

## 2022-02-25 NOTE — ANESTHESIA POSTPROCEDURE EVALUATION
Post-Op Assessment Note    CV Status:  Stable  Pain Score: 0    Pain management: adequate     Mental Status:  Alert and awake   Hydration Status:  Stable   PONV Controlled:  Controlled   Airway Patency:  Patent      Post Op Vitals Reviewed: Yes      Staff: CRNA   Comments: Epidural catheter removed without difficulty, tip intact, site clean    Post-op block assessment: catheter intact and no complications      No complications documented      BP      Temp      Pulse     Resp      SpO2

## 2022-02-25 NOTE — L&D DELIVERY NOTE
DELIVERY NOTE  Reed Palma 46 y o  female MRN: [id number]  Unit/Bed#: -01 Encounter: [id number]    Obstetrician:   Shukri Jarvis MD      Pre-Delivery Diagnosis: Term pregnancy  Induced labor  Single fetus  GBS positive    Post-Delivery Diagnosis: Same as above - Delivered  Viable male fetus    Procedure: Spontaneous vaginal delivery             Complications:  None    Description of Delivery:  See Nolasco is a 42-year-old white  8 para 2144 presenting to Labor and delivery suite for elective induction of labor on 2022 at 39 weeks gestation  Her initial exam was a proximally 2 cm dilated and she chose to proceed with Pitocin induction of labor  She was positive for GBS and penicillin was started  Pitocin was begun as well  Fetal heart tones are reassuring and reactive/category 1 throughout the labor process  She began to get more uncomfortable and requested and was given epidural analgesia with good relief of pain  She was re-examined at approximately 2:51 p m  And had amniotomy for clear fluid performed for further augmentation at that time she was approximately 4 cm dilated 80% effaced  She continued to progress well and achieved complete cervical dilatation at 4:34 p m  Kaila Germaniaow She began pushing at 4:34 p m  As well and at 4:37 p m  She delivered a viable male  via spontaneous assisted vaginal delivery with Apgars 8 at 1 minute 9 at 5 minutes  The cord was allowed to cease pulsating prior to being clamped and cut  The three-vessel cord had pH arterial and venous as well as cord blood samples taken  The placenta was expelled spontaneously intact with trailing membranes at 4:42 p m     The uterus contracted well to massage and IV Pitocin  There were no lacerations hence no repair was needed  She intends to bottle feed    At the end of the procedure both mother and baby were stable and bonding well

## 2022-02-25 NOTE — DISCHARGE SUMMARY
Discharge Summary - Carmen Mendieta 46 y o  female MRN: [id number]    Unit/Bed#: -01 Encounter: [id number]     Admission Date: 2022     Discharge Date: 22    Admitting Diagnosis:   Patient Active Problem List   Diagnosis    Anxiety state    Esophageal reflux    Left breast lump    Nasal septal deviation    Nasal turbinate hypertrophy    39 weeks gestation of pregnancy    Family history of genetic disease    Prenatal care, subsequent pregnancy, third trimester    H/O postpartum hemorrhage, currently pregnant, third trimester    Migraine       Discharge Diagnosis:   Same, delivered    Procedures:   spontaneous vaginal delivery    Admitting Attending: Dr Vesta Mayer MD  Delivery Attending: Dr Vesta Mayer MD  Discharge Attending: Dr Meena Garcia:     Carmen Mendieta is a 46 y o  [id number] who was admitted at 39w0d for eIOL  She was started on pitocin and was AROMed for clear fluid  She received an epidural for analgesia  She then proceeded to complete and started pushing  She then underwent an uncomplicated spontaneous vaginal delivery and delivered a viable male  at 46  APGARS were 8, 8 at 1 and 5 minutes, respectively   weighed 8lb 1 5oz  Placenta was delivered shortly after   was then transferred to  nursery  Patient tolerated the procedure well and was transferred to recovery in stable condition  The patient's post partum course was unremarkable  On day of discharge, she was ambulating and able to reasonably perform all ADLs  She was voiding and had appropriate bowel function  Pain was well controlled  She was discharged home on postpartum day #2 without complications  Patient was instructed to follow up with her OB as an outpatient and was given appropriate warnings to call provider if she develops signs of infection or uncontrolled pain      On day of discharge she was ambulating, voiding spontaneously, tolerating oral intake and hemodynamically stable  She is breast and bottlefeeding   Mom's blood type is AB positiveRhogam was not given  Condition at discharge:   good     Disposition:   See After Visit Summary for discharge disposition information  Planned Readmission:   No    Discharge Medications:   Prenatal vitamin daily for 6 months or the duration of nursing whichever is longer  Motrin 600 mg orally every 6 hours as needed for pain  Tylenol (over the counter) per bottle directions as needed for pain  Hydrocortisone cream 1% (over the counter) applied 1-2x daily to hemorrhoids as needed  Witch hazel pads for hemorrhoidal discomfort as needed      Discharge instructions :   -Do not place anything (no partner, tampons or douche) in your vagina for 6 weeks  -You may walk for exercise for the first 6 weeks then gradually return to your usual activities    -Please do not drive for 1 week if you have no stitches and for 2 weeks if you have stitches or underwent a  delivery     -You may take baths or shower per your preference    -Please look at your bust (breasts) in the mirror daily and call provider for redness or tenderness or increased warmth  - If you have had a  please look at your incision daily as well and call provider for increasing redness or steady drainage from the incision    -Please call your provider if temperature > 100 4*F or 38* C, worsening pain or a foul discharge      Tiera Renee MD

## 2022-02-25 NOTE — OB LABOR/OXYTOCIN SAFETY PROGRESS
Oxytocin Safety Progress Check Note - Margret Burkitt 46 y o  female MRN: [id number]    Unit/Bed#: -01 Encounter: [id number]    Dose (yarely-units/min) Oxytocin: 6 yarely-units/min  Contraction Frequency (minutes): 3-4  Contraction Quality: Moderate  Tachysystole: No   Cervical Dilation: 2-3        Cervical Effacement: 70  Fetal Station: -2  Baseline Rate: 130 bpm  Fetal Heart Rate: 136 BPM  FHR Category: Category I               Vital Signs:   Vitals:    02/25/22 1248   BP: 102/59   Pulse: 67   Resp:    Temp:    SpO2:            Notes/comments:    SVE 2 5/70/-2  S/p epidural  FHT category 1  Pitocin at 6 mU/min, toco q3-4m  Continue titration as tolerated      Barbara Tomas MD 2/25/2022 12:56 PM

## 2022-02-25 NOTE — ANESTHESIA PROCEDURE NOTES
Epidural Block    Patient location during procedure: OB  Start time: 2/25/2022 12:34 PM  Reason for block: procedure for pain and at surgeon's request  Staffing  Performed: Anesthesiologist   Anesthesiologist: Shanice Gomes DO  Resident/CRNA: Wayne Munoz CRNA  Preanesthetic Checklist  Completed: patient identified, IV checked, site marked, risks and benefits discussed, surgical consent, monitors and equipment checked, pre-op evaluation and timeout performed  Epidural  Patient position: sitting  Prep: ChloraPrep  Patient monitoring: frequent blood pressure checks, continuous pulse ox and heart rate  Approach: midline  Location: lumbar  Injection technique: YAO air  Needle  Needle type: Tuohy   Needle gauge: 18 G  Catheter type: side hole  Catheter size: 20 G  Catheter at skin depth: 12 cm  Catheter securement method: stabilization device  Test dose: negativelidocaine 1 5% with epinephrine 1:200,000 test dose, 3 mLnegative aspiration for CSF, negative aspiration for heme and no paresthesia on injection  patient tolerated the procedure well with no immediate complications

## 2022-02-25 NOTE — ANESTHESIA PREPROCEDURE EVALUATION
Procedure:  LABOR ANALGESIA    [id number]  39+0wk      Relevant Problems   ANESTHESIA (within normal limits)      CARDIO   (+) Migraine   (-) HTN (hypertension)   (-) Hyperlipidemia      ENDO   (-) Diabetes mellitus, type 2 (HCC)   (-) Hyperthyroidism   (-) Hypothyroidism      GI/HEPATIC   (+) Esophageal reflux      /RENAL (within normal limits)      GYN   (+) 39 weeks gestation of pregnancy      HEMATOLOGY (within normal limits)      MUSCULOSKELETAL (within normal limits)      NEURO/PSYCH   (+) Anxiety state   (+) H/O postpartum hemorrhage, currently pregnant, third trimester   (+) Migraine      PULMONARY   (-) Asthma   (-) Sleep apnea        Physical Exam    Airway    Mallampati score: II  TM Distance: >3 FB  Neck ROM: full     Dental   No notable dental hx     Cardiovascular      Pulmonary      Other Findings        Anesthesia Plan  ASA Score- 2     Anesthesia Type- epidural with ASA Monitors  Additional Monitors:   Airway Plan:           Plan Factors-Exercise tolerance (METS): >4 METS  Chart reviewed  Existing labs reviewed  Patient summary reviewed  Patient is not a current smoker  Induction-     Postoperative Plan-     Informed Consent- Anesthetic plan and risks discussed with patient  I personally reviewed this patient with the CRNA  Discussed and agreed on the Anesthesia Plan with the GO Purvis

## 2022-02-25 NOTE — OB LABOR/OXYTOCIN SAFETY PROGRESS
Oxytocin Safety Progress Check Note - Arabella Burns 46 y o  female MRN: [id number]    Unit/Bed#: -01 Encounter: [id number]    Dose (yarely-units/min) Oxytocin: 10 yarely-units/min  Contraction Frequency (minutes): 5-7  Contraction Quality: Mild  Tachysystole: No   Cervical Dilation: 4        Cervical Effacement: 80  Fetal Station: -2  Baseline Rate: 140 bpm  Fetal Heart Rate: 142 BPM  FHR Category: Category I  Oxytocin Safety Progress Check: Safety check completed            Vital Signs: BP 98/53   Pulse 77   Temp 98 1 °F (36 7 °C) (Oral)   Resp 20   Ht 5' 8" (1 727 m)   Wt 101 kg (222 lb)   LMP 05/28/2021   SpO2 98%   BMI 33 75 kg/m²   Vitals:    02/25/22 1451   BP: 98/53   Pulse: 77   Resp: 20   Temp: 98 1 °F (36 7 °C)   SpO2:            Notes/comments:      Comfortable with epidural  AROM for augmentation  Continue to titrate Pitocin      Dano Harris MD 2/25/2022 2:56 PM

## 2022-02-25 NOTE — PLAN OF CARE
Problem: Knowledge Deficit  Goal: Verbalizes understanding of labor plan  Description: Assess patient/family/caregiver's baseline knowledge level and ability to understand information  Provide education via patient/family/caregiver's preferred learning method at appropriate level of understanding  1  Provide teaching at level of understanding  2  Provide teaching via preferred learning method(s)  Outcome: Progressing     Problem: Labor & Delivery  Goal: Manages discomfort  Description: Assess and monitor for signs and symptoms of discomfort  Assess patient's pain level regularly and per hospital policy  Administer medications as ordered  Support use of nonpharmacological methods to help control pain such as distraction, imagery, relaxation, and application of heat and cold  Collaborate with interdisciplinary team and patient to determine appropriate pain management plan  1  Include patient in decisions related to comfort  2  Offer non-pharmacological pain management interventions  3  Report ineffective pain management to physician  Outcome: Progressing  Goal: Patient vital signs are stable  Description: 1  Assess vital signs - vaginal delivery    Outcome: Progressing     Problem: BIRTH - VAGINAL/ SECTION  Goal: Fetal and maternal status remain reassuring during the birth process  Description: INTERVENTIONS:  - Monitor vital signs  - Monitor fetal heart rate  - Monitor uterine activity  - Monitor labor progression (vaginal delivery)  - DVT prophylaxis  - Antibiotic prophylaxis  Outcome: Progressing  Goal: Emotionally satisfying birthing experience for mother/fetus  Description: Interventions:  - Assess, plan, implement and evaluate the nursing care given to the patient in labor  - Advocate the philosophy that each childbirth experience is a unique experience and support the family's chosen level of involvement and control during the labor process   - Actively participate in both the patient's and family's teaching of the birth process  - Consider cultural, Yazidism and age-specific factors and plan care for the patient in labor  Outcome: Progressing

## 2022-02-25 NOTE — PLAN OF CARE
Problem: Knowledge Deficit  Goal: Verbalizes understanding of labor plan  Description: Assess patient/family/caregiver's baseline knowledge level and ability to understand information  Provide education via patient/family/caregiver's preferred learning method at appropriate level of understanding  1  Provide teaching at level of understanding  2  Provide teaching via preferred learning method(s)  Outcome: Completed     Problem: Labor & Delivery  Goal: Manages discomfort  Description: Assess and monitor for signs and symptoms of discomfort  Assess patient's pain level regularly and per hospital policy  Administer medications as ordered  Support use of nonpharmacological methods to help control pain such as distraction, imagery, relaxation, and application of heat and cold  Collaborate with interdisciplinary team and patient to determine appropriate pain management plan  1  Include patient in decisions related to comfort  2  Offer non-pharmacological pain management interventions  3  Report ineffective pain management to physician  Outcome: Completed  Goal: Patient vital signs are stable  Description: 1  Assess vital signs - vaginal delivery    Outcome: Completed     Problem: BIRTH - VAGINAL/ SECTION  Goal: Fetal and maternal status remain reassuring during the birth process  Description: INTERVENTIONS:  - Monitor vital signs  - Monitor fetal heart rate  - Monitor uterine activity  - Monitor labor progression (vaginal delivery)  - DVT prophylaxis  - Antibiotic prophylaxis  Outcome: Completed  Goal: Emotionally satisfying birthing experience for mother/fetus  Description: Interventions:  - Assess, plan, implement and evaluate the nursing care given to the patient in labor  - Advocate the philosophy that each childbirth experience is a unique experience and support the family's chosen level of involvement and control during the labor process   - Actively participate in both the patient's and family's teaching of the birth process  - Consider cultural, [employer]ism and age-specific factors and plan care for the patient in labor  Outcome: Completed     Problem: PAIN - ADULT  Goal: Verbalizes/displays adequate comfort level or baseline comfort level  Description: Interventions:  - Encourage patient to monitor pain and request assistance  - Assess pain using appropriate pain scale  - Administer analgesics based on type and severity of pain and evaluate response  - Implement non-pharmacological measures as appropriate and evaluate response  - Consider cultural and social influences on pain and pain management  - Notify physician/advanced practitioner if interventions unsuccessful or patient reports new pain  Outcome: Progressing     Problem: INFECTION - ADULT  Goal: Absence or prevention of progression during hospitalization  Description: INTERVENTIONS:  - Assess and monitor for signs and symptoms of infection  - Monitor lab/diagnostic results  - Monitor all insertion sites, i e  indwelling lines, tubes, and drains  - Monitor endotracheal if appropriate and nasal secretions for changes in amount and color  - [place] appropriate cooling/warming therapies per order  - Administer medications as ordered  - Instruct and encourage patient and family to use good hand hygiene technique  - Identify and instruct in appropriate isolation precautions for identified infection/condition  Outcome: Progressing  Goal: Absence of fever/infection during neutropenic period  Description: INTERVENTIONS:  - Monitor WBC    Outcome: Progressing     Problem: SAFETY ADULT  Goal: Patient will remain free of falls  Description: INTERVENTIONS:  - Educate patient/family on patient safety including physical limitations  - Instruct patient to call for assistance with activity   - Consult OT/PT to assist with strengthening/mobility   - Keep Call bell within reach  - Keep bed low and locked with side rails adjusted as appropriate  - Keep care items and personal belongings within reach  - Initiate and maintain comfort rounds  - Apply yellow socks and bracelet for high fall risk patients  - Consider moving patient to room near nurses station  Outcome: Progressing  Goal: Maintain or return to baseline ADL function  Description: INTERVENTIONS:  -  Assess patient's ability to carry out ADLs; assess patient's baseline for ADL function and identify physical deficits which impact ability to perform ADLs (bathing, care of mouth/teeth, toileting, grooming, dressing, etc )  - Assess/evaluate cause of self-care deficits   - Assess range of motion  - Assess patient's mobility; develop plan if impaired  - Assess patient's need for assistive devices and provide as appropriate  - Encourage maximum independence but intervene and supervise when necessary  - Involve family in performance of ADLs  - Assess for home care needs following discharge   - Consider OT consult to assist with ADL evaluation and planning for discharge  - Provide patient education as appropriate  Outcome: Progressing  Goal: Maintains/Returns to pre admission functional level  Description: INTERVENTIONS:  - Perform BMAT or MOVE assessment daily    - Set and communicate daily mobility goal to care team and patient/family/caregiver  - Out of bed for toileting  - Record patient progress and toleration of activity level   Outcome: Progressing     Problem: Knowledge Deficit  Goal: Patient/family/caregiver demonstrates understanding of disease process, treatment plan, medications, and discharge instructions  Description: Complete learning assessment and assess knowledge base    Interventions:  - Provide teaching at level of understanding  - Provide teaching via preferred learning methods  Outcome: Progressing     Problem: DISCHARGE PLANNING  Goal: Discharge to home or other facility with appropriate resources  Description: INTERVENTIONS:  - Identify barriers to discharge w/patient and caregiver  - Arrange for needed discharge resources and transportation as appropriate  - Identify discharge learning needs (meds, wound care, etc )  - Arrange for interpretive services to assist at discharge as needed  - Refer to Case Management Department for coordinating discharge planning if the patient needs post-hospital services based on physician/advanced practitioner order or complex needs related to functional status, cognitive ability, or social support system  Outcome: Progressing

## 2022-02-26 PROCEDURE — 99024 POSTOP FOLLOW-UP VISIT: CPT | Performed by: OBSTETRICS & GYNECOLOGY

## 2022-02-26 RX ADMIN — ACETAMINOPHEN 650 MG: 325 TABLET, FILM COATED ORAL at 11:53

## 2022-02-26 RX ADMIN — IBUPROFEN 600 MG: 600 TABLET ORAL at 14:29

## 2022-02-26 RX ADMIN — IBUPROFEN 600 MG: 600 TABLET ORAL at 01:45

## 2022-02-26 RX ADMIN — DOCUSATE SODIUM 100 MG: 100 CAPSULE ORAL at 18:17

## 2022-02-26 RX ADMIN — IBUPROFEN 600 MG: 600 TABLET ORAL at 20:10

## 2022-02-26 RX ADMIN — ACETAMINOPHEN 650 MG: 325 TABLET, FILM COATED ORAL at 03:40

## 2022-02-26 RX ADMIN — DOCUSATE SODIUM 100 MG: 100 CAPSULE ORAL at 08:04

## 2022-02-26 RX ADMIN — IBUPROFEN 600 MG: 600 TABLET ORAL at 08:04

## 2022-02-26 NOTE — PLAN OF CARE
Problem: PAIN - ADULT  Goal: Verbalizes/displays adequate comfort level or baseline comfort level  Description: Interventions:  - Encourage patient to monitor pain and request assistance  - Assess pain using appropriate pain scale  - Administer analgesics based on type and severity of pain and evaluate response  - Implement non-pharmacological measures as appropriate and evaluate response  - Consider cultural and social influences on pain and pain management  - Notify physician/advanced practitioner if interventions unsuccessful or patient reports new pain  Outcome: Progressing     Problem: INFECTION - ADULT  Goal: Absence or prevention of progression during hospitalization  Description: INTERVENTIONS:  - Assess and monitor for signs and symptoms of infection  - Monitor lab/diagnostic results  - Monitor all insertion sites, i e  indwelling lines, tubes, and drains  - Monitor endotracheal if appropriate and nasal secretions for changes in amount and color  - [place] appropriate cooling/warming therapies per order  - Administer medications as ordered  - Instruct and encourage patient and family to use good hand hygiene technique  - Identify and instruct in appropriate isolation precautions for identified infection/condition  Outcome: Progressing  Goal: Absence of fever/infection during neutropenic period  Description: INTERVENTIONS:  - Monitor WBC    Outcome: Progressing     Problem: SAFETY ADULT  Goal: Patient will remain free of falls  Description: INTERVENTIONS:  - Educate patient/family on patient safety including physical limitations  - Instruct patient to call for assistance with activity   - Consult OT/PT to assist with strengthening/mobility   - Keep Call bell within reach  - Keep bed low and locked with side rails adjusted as appropriate  - Keep care items and personal belongings within reach  - Initiate and maintain comfort rounds  - Make Fall Risk Sign visible to staff  - Apply yellow socks and bracelet for high fall risk patients  - Consider moving patient to room near nurses station  Outcome: Progressing  Goal: Maintain or return to baseline ADL function  Description: INTERVENTIONS:  -  Assess patient's ability to carry out ADLs; assess patient's baseline for ADL function and identify physical deficits which impact ability to perform ADLs (bathing, care of mouth/teeth, toileting, grooming, dressing, etc )  - Assess/evaluate cause of self-care deficits   - Assess range of motion  - Assess patient's mobility; develop plan if impaired  - Assess patient's need for assistive devices and provide as appropriate  - Encourage maximum independence but intervene and supervise when necessary  - Involve family in performance of ADLs  - Assess for home care needs following discharge   - Consider OT consult to assist with ADL evaluation and planning for discharge  - Provide patient education as appropriate  Outcome: Progressing  Goal: Maintains/Returns to pre admission functional level  Description: INTERVENTIONS:  - Perform BMAT or MOVE assessment daily    - Set and communicate daily mobility goal to care team and patient/family/caregiver  - Collaborate with rehabilitation services on mobility goals if consulted  - Out of bed for toileting  - Record patient progress and toleration of activity level   Outcome: Progressing     Problem: Knowledge Deficit  Goal: Patient/family/caregiver demonstrates understanding of disease process, treatment plan, medications, and discharge instructions  Description: Complete learning assessment and assess knowledge base    Interventions:  - Provide teaching at level of understanding  - Provide teaching via preferred learning methods  Outcome: Progressing     Problem: DISCHARGE PLANNING  Goal: Discharge to home or other facility with appropriate resources  Description: INTERVENTIONS:  - Identify barriers to discharge w/patient and caregiver  - Arrange for needed discharge resources and transportation as appropriate  - Identify discharge learning needs (meds, wound care, etc )  - Arrange for interpretive services to assist at discharge as needed  - Refer to Case Management Department for coordinating discharge planning if the patient needs post-hospital services based on physician/advanced practitioner order or complex needs related to functional status, cognitive ability, or social support system  Outcome: Progressing     Problem: POSTPARTUM  Goal: Experiences normal postpartum course  Description: INTERVENTIONS:  - Monitor maternal vital signs  - Assess uterine involution and lochia  Outcome: Progressing  Goal: Appropriate maternal -  bonding  Description: INTERVENTIONS:  - Identify family support  - Assess for appropriate maternal/infant bonding   -Encourage maternal/infant bonding opportunities  - Referral to  or  as needed  Outcome: Progressing  Goal: Establishment of infant feeding pattern  Description: INTERVENTIONS:  - Assess breast/bottle feeding  - Refer to lactation as needed  Outcome: Progressing  Goal: Incision(s), wounds(s) or drain site(s) healing without S/S of infection  Description: INTERVENTIONS  - Assess and document dressing, incision, wound bed, drain sites and surrounding tissue  - Provide patient and family education  Outcome: Progressing

## 2022-02-26 NOTE — PROGRESS NOTES
Progress Note - OB/GYN   Jamar Bey 46 y o  female MRN: [id number]  Unit/Bed#:  302-01 Encounter: [id number]    Assessment:  Post partum Day #1 s/p , stable, baby in room    Plan:  1) Hx of PPH   Noted   2) Continue routine post partum care   Encourage ambulation   Encourage breastfeeding   Contraception: Micronor   Anticipate discharge PPD#2     Subjective/Objective   Chief Complaint:     Post delivery  Patient is doing well  Lochia WNL  Pain well controlled  Subjective:     Pain: yes, cramping, improved with meds  Tolerating PO: yes  Voiding: yes  Flatus: yes  Ambulating: yes  Chest pain: no  Shortness of breath: no  Leg pain: no  Lochia: minimal    Objective:     Vitals: BP 96/52 (BP Location: Left arm)   Pulse 69   Temp 98 2 °F (36 8 °C) (Oral)   Resp 18   Ht 5' 8" (1 727 m)   Wt 101 kg (222 lb)   LMP 2021   SpO2 98%   Breastfeeding Yes   BMI 33 75 kg/m²     I/O        0701   0700  0701   0700    Urine (mL/kg/hr)  1650    Blood  149    Total Output  1799    Net  -1799                Lab Results   Component Value Date    WBC 9 94 2022    HGB 12 0 2022    HCT 35 5 2022    MCV 95 2022     2022       Physical Exam:     Gen: AAOx3, NAD  CV: RRR  Lungs: CTA b/l  Abd: Soft, non-tender, non-distended, no rebound or guarding  Uterine fundus firm and non-tender, 1 cm below the umbilicus    Ext: Non tender    Haylie Nielsen MD  2022  1:54 AM

## 2022-02-26 NOTE — LACTATION NOTE
This note was copied from a baby's chart  CONSULT - LACTATION  Baby Boy (Zuleika) Dustin Lakhaniiar 1 days male MRN: [id number]    [facility] NURSERY Room / Bed: (N)/(N) Encounter: [id number]    Maternal Information     MOTHER:  Zuleika Dolan  Maternal Age: 46 y o    OB History: # 1 - Date: 2005, Sex: None, Weight: None, GA: None, Delivery: None, Apgar1: None, Apgar5: None, Living: None, Birth Comments: None    # 2 - Date: 2007, Sex: None, Weight: None, GA: None, Delivery: None, Apgar1: None, Apgar5: None, Living: None, Birth Comments: None    # 3 - Date: 2012, Sex: None, Weight: None, GA: None, Delivery: None, Apgar1: None, Apgar5: None, Living: None, Birth Comments: None    # 4 - Date: 09/05/13, Sex: Female, Weight: 4082 g (9 lb), GA: 40w0d, Delivery: Vaginal, Spontaneous, Apgar1: None, Apgar5: None, Living: Living, Birth Comments: None    # 5 - Date: 12/20/14, Sex: Female, Weight: 3629 g (8 lb), GA: 40w0d, Delivery: Vaginal, Spontaneous, Apgar1: None, Apgar5: None, Living: Living, Birth Comments: postpartum hemorrhage    # 6 - Date: 2016, Sex: None, Weight: None, GA: None, Delivery: None, Apgar1: None, Apgar5: None, Living: None, Birth Comments: None    # 7A - Date: 06/26/17, Sex: Male, Weight: 2960 g (6 lb 8 4 oz), GA: 36w0d, Delivery: Vaginal, Spontaneous, Apgar1: 9, Apgar5: 9, Living: Living, Birth Comments: None  # 7B - Date: 06/26/17, Sex: Male, Weight: 2635 g (5 lb 13 oz), GA: 36w0d, Delivery: Vaginal, Spontaneous, Apgar1: 9, Apgar5: 9, Living: Living, Birth Comments: None    # 8 - Date: None, Sex: None, Weight: None, GA: None, Delivery: None, Apgar1: None, Apgar5: None, Living: None, Birth Comments: None   Previouse breast reduction surgery?  No    Lactation history:   Has patient previously breast fed: Yes   How long had patient previously breast fed: attempted with first 2 children a few months   Previous breast feeding complications: Breast/nipple pain,Low milk supply Past Surgical History:   Procedure Laterality Date    BREAST CYST ASPIRATION Left     cyst asp     DENTAL SURGERY      DILATION AND CURETTAGE OF UTERUS      NASAL SEPTUM SURGERY          Birth information:  YOB: 2020   Time of birth: 4:37 PM   Sex: male   Delivery type: Vaginal, Spontaneous   Birth Weight: 3670 g (8 lb 1 5 oz)   Percent of Weight Change: 0%     Gestational Age: 39w0d   [unfilled]    Assessment     Breast and nipple assessment:  symmetrical, pendulous breasts; nipples duc, but short shank  Left nipple is blistered with compression stripe from 2-7 on nipple face   Assessment: restricted tongue movement, receded chin and upon oral assessment, wider mouth with support, tongue faces downward, bilateral laterization is WNL, elevation is mid anterior  Upon digital suck exam,  tongue presents with piston like movement; compresses the digit mid-posterior  Upon finger sweep, frenulum is thin, taut, anchors at base of lower alveolar ridge and is mid-postorally placed  Feeding assessment: latch difficulty (due to shallow latch; compression of nipple and movement of tongue)  LATCH:  Latch: Grasps breast, tongue down, lips flanged, rhythmic sucking   Audible Swallowing: Spontaneous and intermittent (24 hours old)   Type of Nipple: Everted (After stimulation)   Comfort (Breast/Nipple): Filling, red/small blisters/bruises, mild/moderate discomfort   Hold (Positioning): Partial assist, teach one side, mother does other, staff holds   [facility] Score: 8          Feeding recommendations:  breast feed on demand, pump after feeds or if feeding is painful  Baby name is Aven     Mom complains of sore nipples  Left nipple presents with compression stripe from 2- 7 on nipple face  Comes out of breast as lipstick shape  Education on wet wound care   To help your nipples heal, in addition to paying close attention to latch and positioning, apply protective ointment after feeding or pumping and cover with an occlusive dressing  Education on s2s, hand expression, and posiitoning  Attempted cross cradle, laid back  Mom prefers football for less pain with latch  Education on flanging lips at the breast and breast compressions for milk transfer  Provided education on alignment of nose to breast; bring baby to breast and not breast to baby; support head with opp  Hand in cross cradle; use pillows to lift baby to be belly to belly; ear, shoulder, hip alignment; Support mother's back and place self in comfortable position to support bringing baby to the breast  Shoulders should be down and away from ears  Set up pump for mom  Instructions given on pumping  Discussed when to start, frequency, different pumps available versus manual expression  Discussed hygiene of hands and supplies as well as assembly, placement of flanges, size of flanged, preparing the breast and cycles and suction settings on pump  Demonstrated use of hand pump  Discussed labeling of milk, storage, and preparation of stored milk  Pumping:   - When pumping, begin in stimulation mode (high cycle, low vacuum) until milk begins to express  Change pump to expression mode (low cycle, high vacuum)  Use hands on pumping techniques to assist with milk transfer  When milk stops expressing, change back to stimulation mode  When milk begins to flow, change to expression mode  You make cycle pump up to three times in a pumping session  Hydrogels and breast shells used for healing  C/O sore nipples  Information given about sore nipples and how to correct with positioning techniques  Discussed maneuvers to latch infant on properly to avoid nipple pain and promote healing  Discussed treatments that could be utilized to promote healing  Hydro gel dressings given with instruction and verbalization of understanding of cleaning and duration of use  Education on alternative feeding methods   Demonstration and teach back of syringe, Encouraged to call lactation for additional assistance with feedings  Created feeding plan for discharge    Discharge feeding plan    1  Meet early feeding cues  2  Use massage, warmth, hand expression to stimulate breasts  3  Bring baby to breast skin to skin  4  Align nipple to nose, drag nipple to chin, (move baby not breast) and bring baby to breast when mouth is wide and deep latch is achieved  5  Use breast compressions to stimulate suck  6  Once baby does not suck with stimulation, becomes fussy, or un-latches feed expressed milk via alternative feeding method (syringe, paced bottle)  7  Bring baby back to breast for non-nutritive suck and skin to skin  8  Pump after each feed to stimulate breasts and have expressed milk for next feed    Mom has pump at home    RSB/DC Reviewed    Information on hand expression given  Discussed benefits of knowing how to manually express breast including stimulating milk supply, softening nipple for latch and evacuating breast in the event of engorgement  Mom is encouraged to place baby skin to skin for feedings  Skin to skin education provided for baby placement on mother's chest, baby only in diaper, blankets below shoulders on baby's back  Skin to skin is encouraged to continue at home for feedings and between feedings  Worked on positioning infant up at chest level and starting to feed infant with nose arriving at the nipple  Then, using areolar compression to achieve a deep latch that is comfortable and exchanges optimum amounts of milk  - Start feedings on breast that last feeding ended   - allow no more than 3 hours between breast feeding sessions   - time between feedings is counted from the beginning of the first feed to the beginning of the next feeding session    Reviewed early signs of hunger, including tensing of hands and shoulders - no need to wait for open eyes  Crying is a late hunger sign    If baby is crying, soothe baby first and then attempt to latch  Reviewed normal sucking patterns: transition from stimulation to nutritive to release or non-nutritive  The goal is to see and hear lots of swallowing  Reviewed normal nursing pattern: infant could latch on one breast up to 30 minutes or until releases on own  Signs of satiation is open hand with fingers that do not grab your finger  Discussed difference in sensation of non-nutritive v nutritive sucking    Met with mother  Provided mother with Ready, Set, Baby booklet  Discussed Skin to Skin contact an benefits to mom and baby  Talked about the delay of the first bath until baby has adjusted  Spoke about the benefits of rooming in  Feeding on cue and what that means for recognizing infant's hunger  Avoidance of pacifiers for the first month discussed  Talked about exclusive breastfeeding for the first 6 months  Positioning and latch reviewed as well as showing images of other feeding positions  Discussed the properties of a good latch in any position  Reviewed hand/manual expression  Discussed s/s that baby is getting enough milk and some s/s that breastfeeding dyad may need further help  Gave information on common concerns, what to expect the first few weeks after delivery, preparing for other caregivers, and how partners can help  Resources for support also provided  Encouraged parents to call for assistance, questions, and concerns about breastfeeding  Extension provided  Met with mother to go over discharge breastfeeding booklet including the feeding log  Emphasized 8 or more (12) feedings in a 24 hour period, what to expect for the number of diapers per day of life and the progression of properties of the  stooling pattern  Reviewed breastfeeding and your lifestyle, storage and preparation of breast milk, how to keep you breast pump clean, the employed breastfeeding mother and paced bottle feeding handouts       Booklet included Breastfeeding Resources for after discharge including access to the number for the [id number]  Provided education on growth spurts, when to introduce bottles; paced bottle feeding, and non-nutritive suck at the breast  Provided education on Signs of satiation  Encouraged to call lactation to observe a latch prior to discharge for reassurance  Encouraged to call baby and me with any questions and closely monitor output    Asif, [address] [place] 2/26/2022 11:08 AM

## 2022-02-26 NOTE — PLAN OF CARE
Problem: POSTPARTUM  Goal: Experiences normal postpartum course  Description: INTERVENTIONS:  - Monitor maternal vital signs  - Assess uterine involution and lochia  Outcome: Progressing  Goal: Appropriate maternal -  bonding  Description: INTERVENTIONS:  - Identify family support  - Assess for appropriate maternal/infant bonding   -Encourage maternal/infant bonding opportunities  - Referral to  or  as needed  Outcome: Progressing  Goal: Establishment of infant feeding pattern  Description: INTERVENTIONS:  - Assess breast/bottle feeding  - Refer to lactation as needed  Outcome: Progressing  Goal: Incision(s), wounds(s) or drain site(s) healing without S/S of infection  Description: INTERVENTIONS  - Assess and document dressing, incision, wound bed, drain sites and surrounding tissue  - Provide patient and family education  - Perform skin care/dressing changes every   Outcome: Progressing     Problem: PAIN - ADULT  Goal: Verbalizes/displays adequate comfort level or baseline comfort level  Description: Interventions:  - Encourage patient to monitor pain and request assistance  - Assess pain using appropriate pain scale  - Administer analgesics based on type and severity of pain and evaluate response  - Implement non-pharmacological measures as appropriate and evaluate response  - Consider cultural and social influences on pain and pain management  - Notify physician/advanced practitioner if interventions unsuccessful or patient reports new pain  Outcome: Progressing     Problem: INFECTION - ADULT  Goal: Absence or prevention of progression during hospitalization  Description: INTERVENTIONS:  - Assess and monitor for signs and symptoms of infection  - Monitor lab/diagnostic results  - Monitor all insertion sites, i e  indwelling lines, tubes, and drains  - Monitor endotracheal if appropriate and nasal secretions for changes in amount and color  - [place] appropriate cooling/warming therapies per order  - Administer medications as ordered  - Instruct and encourage patient and family to use good hand hygiene technique  - Identify and instruct in appropriate isolation precautions for identified infection/condition  Outcome: Progressing  Goal: Absence of fever/infection during neutropenic period  Description: INTERVENTIONS:  - Monitor WBC    Outcome: Progressing     Problem: SAFETY ADULT  Goal: Patient will remain free of falls  Description: INTERVENTIONS:  - Educate patient/family on patient safety including physical limitations  - Instruct patient to call for assistance with activity   - Consult OT/PT to assist with strengthening/mobility   - Keep Call bell within reach  - Keep bed low and locked with side rails adjusted as appropriate  - Keep care items and personal belongings within reach  - Initiate and maintain comfort rounds  - Make Fall Risk Sign visible to staff  - Offer Toileting every Hours, in advance of need  - Initiate/Maintain alarm  - Obtain necessary fall risk management equipment:   - Apply yellow socks and bracelet for high fall risk patients  - Consider moving patient to room near nurses station  Outcome: Progressing  Goal: Maintain or return to baseline ADL function  Description: INTERVENTIONS:  -  Assess patient's ability to carry out ADLs; assess patient's baseline for ADL function and identify physical deficits which impact ability to perform ADLs (bathing, care of mouth/teeth, toileting, grooming, dressing, etc )  - Assess/evaluate cause of self-care deficits   - Assess range of motion  - Assess patient's mobility; develop plan if impaired  - Assess patient's need for assistive devices and provide as appropriate  - Encourage maximum independence but intervene and supervise when necessary  - Involve family in performance of ADLs  - Assess for home care needs following discharge   - Consider OT consult to assist with ADL evaluation and planning for discharge  - Provide patient education as appropriate  Outcome: Progressing  Goal: Maintains/Returns to pre admission functional level  Description: INTERVENTIONS:  - Perform BMAT or MOVE assessment daily    - Set and communicate daily mobility goal to care team and patient/family/caregiver  - Collaborate with rehabilitation services on mobility goals if consulted  - Perform Range of Motion times a day  - Reposition patient every hours  - Dangle patient  times a day  - Stand patient times a day  - Ambulate patient times a day  - Out of bed to chair times a day   - Out of bed for meals times a day  - Out of bed for toileting  - Record patient progress and toleration of activity level   Outcome: Progressing     Problem: Knowledge Deficit  Goal: Patient/family/caregiver demonstrates understanding of disease process, treatment plan, medications, and discharge instructions  Description: Complete learning assessment and assess knowledge base    Interventions:  - Provide teaching at level of understanding  - Provide teaching via preferred learning methods  Outcome: Progressing     Problem: DISCHARGE PLANNING  Goal: Discharge to home or other facility with appropriate resources  Description: INTERVENTIONS:  - Identify barriers to discharge w/patient and caregiver  - Arrange for needed discharge resources and transportation as appropriate  - Identify discharge learning needs (meds, wound care, etc )  - Arrange for interpretive services to assist at discharge as needed  - Refer to Case Management Department for coordinating discharge planning if the patient needs post-hospital services based on physician/advanced practitioner order or complex needs related to functional status, cognitive ability, or social support system  Outcome: Progressing

## 2022-02-27 VITALS
HEART RATE: 64 BPM | HEIGHT: 68 IN | DIASTOLIC BLOOD PRESSURE: 58 MMHG | WEIGHT: 222 LBS | TEMPERATURE: 98.5 F | SYSTOLIC BLOOD PRESSURE: 110 MMHG | RESPIRATION RATE: 18 BRPM | BODY MASS INDEX: 33.65 KG/M2 | OXYGEN SATURATION: 98 %

## 2022-02-27 PROCEDURE — 99024 POSTOP FOLLOW-UP VISIT: CPT | Performed by: OBSTETRICS & GYNECOLOGY

## 2022-02-27 RX ORDER — DIAPER,BRIEF,INFANT-TODD,DISP
1 EACH MISCELLANEOUS DAILY PRN
Qty: 30 G | Refills: 0
Start: 2022-02-27 | End: 2022-03-21

## 2022-02-27 RX ORDER — IBUPROFEN 600 MG/1
600 TABLET ORAL EVERY 6 HOURS
Qty: 30 TABLET | Refills: 0
Start: 2022-02-27 | End: 2022-07-19 | Stop reason: ALTCHOICE

## 2022-02-27 RX ORDER — DOCUSATE SODIUM 100 MG/1
100 CAPSULE, LIQUID FILLED ORAL 2 TIMES DAILY
Refills: 0
Start: 2022-02-27 | End: 2022-03-21

## 2022-02-27 RX ORDER — ACETAMINOPHEN AND CODEINE PHOSPHATE 120; 12 MG/5ML; MG/5ML
1 SOLUTION ORAL DAILY
Qty: 28 TABLET | Refills: 5 | Status: CANCELLED | OUTPATIENT
Start: 2022-02-27 | End: 2022-03-29

## 2022-02-27 RX ORDER — CALCIUM CARBONATE 200(500)MG
1000 TABLET,CHEWABLE ORAL DAILY PRN
Refills: 0
Start: 2022-02-27 | End: 2022-03-21

## 2022-02-27 RX ORDER — ACETAMINOPHEN 325 MG/1
650 TABLET ORAL EVERY 4 HOURS PRN
Refills: 0
Start: 2022-02-27 | End: 2022-07-19 | Stop reason: ALTCHOICE

## 2022-02-27 RX ADMIN — DOCUSATE SODIUM 100 MG: 100 CAPSULE ORAL at 08:32

## 2022-02-27 RX ADMIN — IBUPROFEN 600 MG: 600 TABLET ORAL at 08:32

## 2022-02-27 RX ADMIN — IBUPROFEN 600 MG: 600 TABLET ORAL at 03:11

## 2022-02-27 NOTE — LACTATION NOTE
This note was copied from a baby's chart  Donor milk provided for home use:    [id number](86)  [id number](9)  Exp: 9/27/2022    Private pay form and prescription faxed to [facility]  Provided family with information on donor milk depot in the [facility]

## 2022-02-27 NOTE — PROGRESS NOTES
Progress Note - OB/GYN   Tino Zavala 46 y o  female MRN: [id number]  Unit/Bed#:  302-01 Encounter: [id number]    Assessment:  Post partum Day #2 s/p , stable, baby in room    Plan:  1) Continue routine post partum care   Encourage ambulation   Encourage breastfeeding   Contraception: Micronor   Anticipate discharge PPD#2     Subjective/Objective   Chief Complaint:     Post delivery  Patient is doing well  Lochia WNL  Pain well controlled  Subjective:     Pain: yes, cramping, improved with meds  Tolerating PO: yes  Voiding: yes  Flatus: yes  Ambulating: yes  Chest pain: no  Shortness of breath: no  Leg pain: no  Lochia: minimal    Objective:     Vitals: /55 (BP Location: Left arm)   Pulse 72   Temp 98 6 °F (37 °C) (Oral)   Resp 18   Ht 5' 8" (1 727 m)   Wt 101 kg (222 lb)   LMP 2021   SpO2 98%   Breastfeeding Yes   BMI 33 75 kg/m²     I/O        0701   0700  0701   0700  0701   0700    Urine (mL/kg/hr) 1650      Blood 149      Total Output 1799      Net -1799                   Lab Results   Component Value Date    WBC 9 94 2022    HGB 12 0 2022    HCT 35 5 2022    MCV 95 2022     2022       Physical Exam:     Gen: AAOx3, NAD  CV: RRR  Lungs: CTA b/l  Abd: Soft, non-tender, non-distended, no rebound or guarding  Uterine fundus firm and non-tender, 1 cm below the umbilicus    Ext: Non tender    Malissa Mayes MD  2022  7:32 AM

## 2022-02-27 NOTE — PLAN OF CARE
Problem: PAIN - ADULT  Goal: Verbalizes/displays adequate comfort level or baseline comfort level  Description: Interventions:  - Encourage patient to monitor pain and request assistance  - Assess pain using appropriate pain scale  - Administer analgesics based on type and severity of pain and evaluate response  - Implement non-pharmacological measures as appropriate and evaluate response  - Consider cultural and social influences on pain and pain management  - Notify physician/advanced practitioner if interventions unsuccessful or patient reports new pain  Outcome: Progressing     Problem: INFECTION - ADULT  Goal: Absence or prevention of progression during hospitalization  Description: INTERVENTIONS:  - Assess and monitor for signs and symptoms of infection  - Monitor lab/diagnostic results  - Monitor all insertion sites, i e  indwelling lines, tubes, and drains  - Monitor endotracheal if appropriate and nasal secretions for changes in amount and color  - [place] appropriate cooling/warming therapies per order  - Administer medications as ordered  - Instruct and encourage patient and family to use good hand hygiene technique  - Identify and instruct in appropriate isolation precautions for identified infection/condition  Outcome: Progressing  Goal: Absence of fever/infection during neutropenic period  Description: INTERVENTIONS:  - Monitor WBC    Outcome: Progressing     Problem: SAFETY ADULT  Goal: Patient will remain free of falls  Description: INTERVENTIONS:  - Educate patient/family on patient safety including physical limitations  - Instruct patient to call for assistance with activity   - Consult OT/PT to assist with strengthening/mobility   - Keep Call bell within reach  - Keep bed low and locked with side rails adjusted as appropriate  - Keep care items and personal belongings within reach  - Initiate and maintain comfort rounds  - Make Fall Risk Sign visible to staff  - Offer Toileting every  Hours, in advance of need  - Initiate/Maintain alarm  - Obtain necessary fall risk management equipment:   - Apply yellow socks and bracelet for high fall risk patients  - Consider moving patient to room near nurses station  Outcome: Progressing  Goal: Maintain or return to baseline ADL function  Description: INTERVENTIONS:  -  Assess patient's ability to carry out ADLs; assess patient's baseline for ADL function and identify physical deficits which impact ability to perform ADLs (bathing, care of mouth/teeth, toileting, grooming, dressing, etc )  - Assess/evaluate cause of self-care deficits   - Assess range of motion  - Assess patient's mobility; develop plan if impaired  - Assess patient's need for assistive devices and provide as appropriate  - Encourage maximum independence but intervene and supervise when necessary  - Involve family in performance of ADLs  - Assess for home care needs following discharge   - Consider OT consult to assist with ADL evaluation and planning for discharge  - Provide patient education as appropriate  Outcome: Progressing  Goal: Maintains/Returns to pre admission functional level  Description: INTERVENTIONS:  - Perform BMAT or MOVE assessment daily    - Set and communicate daily mobility goal to care team and patient/family/caregiver  - Collaborate with rehabilitation services on mobility goals if consulted  - Perform Range of Motion  times a day  - Reposition patient every  hours    - Dangle patient  times a day  - Stand patient  times a day  - Ambulate patient  times a day  - Out of bed to chair  times a day   - Out of bed for meals times a day  - Out of bed for toileting  - Record patient progress and toleration of activity level   Outcome: Progressing     Problem: Knowledge Deficit  Goal: Patient/family/caregiver demonstrates understanding of disease process, treatment plan, medications, and discharge instructions  Description: Complete learning assessment and assess knowledge base   Interventions:  - Provide teaching at level of understanding  - Provide teaching via preferred learning methods  Outcome: Progressing     Problem: DISCHARGE PLANNING  Goal: Discharge to home or other facility with appropriate resources  Description: INTERVENTIONS:  - Identify barriers to discharge w/patient and caregiver  - Arrange for needed discharge resources and transportation as appropriate  - Identify discharge learning needs (meds, wound care, etc )  - Arrange for interpretive services to assist at discharge as needed  - Refer to Case Management Department for coordinating discharge planning if the patient needs post-hospital services based on physician/advanced practitioner order or complex needs related to functional status, cognitive ability, or social support system  Outcome: Progressing     Problem: POSTPARTUM  Goal: Experiences normal postpartum course  Description: INTERVENTIONS:  - Monitor maternal vital signs  - Assess uterine involution and lochia  Outcome: Progressing  Goal: Appropriate maternal -  bonding  Description: INTERVENTIONS:  - Identify family support  - Assess for appropriate maternal/infant bonding   -Encourage maternal/infant bonding opportunities  - Referral to  or  as needed  Outcome: Progressing  Goal: Establishment of infant feeding pattern  Description: INTERVENTIONS:  - Assess breast/bottle feeding  - Refer to lactation as needed  Outcome: Progressing  Goal: Incision(s), wounds(s) or drain site(s) healing without S/S of infection  Description: INTERVENTIONS  - Assess and document dressing, incision, wound bed, drain sites and surrounding tissue  - Provide patient and family education  - Perform skin care/dressing changes every   Outcome: Progressing

## 2022-02-27 NOTE — DISCHARGE INSTRUCTIONS
We will discuss and prescribe your progesterone only pill at your after baby (postpartum) visit at the office  Self Care After Delivery   AMBULATORY CARE:   The postpartum period  is the period of time from delivery to about 6 weeks  During this time you may experience many physical and emotional changes  It is important to understand what is normal and when you need to call your healthcare provider  It is also important to know how to care for yourself during this time  Call your local emergency number (911 in the [address]) for any of the following:   · You see or hear things that are not there, or have thoughts of harming yourself or your baby  · You soak through 1 pad in 15 minutes, have blurry vision, clammy or pale skin, and feel faint  · You faint or lose consciousness  · You have trouble breathing  · You cough up blood  · Your  incision comes apart  Seek care immediately if:   · Your heart is beating faster than usual     · You have a bad headache or changes in your vision  · Your episiotomy or  incision is red, swollen, bleeding, or draining pus  · You have severe abdominal pain  Call your doctor or obstetrician if:   · Your leg is painful, red, and larger than usual     · You soak through 1 or more pads in an hour, or pass blood clots larger than a quarter from your vagina  · You have a fever  · You have new or worsening pain in your abdomen or vagina  · You continue to have depression 1 to 2 weeks after you deliver  · You have trouble sleeping  · You have foul-smelling discharge from your vagina  · You have pain or burning when you urinate  · You do not have a bowel movement for 3 days or more  · You have nausea or are vomiting  · You have hard lumps or red streaks over your breasts  · You have cracked nipples or bleed from your nipples  · You have questions or concerns about your condition or care  Physical changes:   The following are normal changes after you give birth:  · Pain in the area between your anus and vagina    · Breast pain    · Constipation or hemorrhoids    · Hot or cold flashes    · Vaginal bleeding or discharge    · Mild to moderate abdominal cramping    · Difficulty controlling bowel movements or urine    Emotional changes:  A drop in hormone levels after you deliver may cause changes in your emotions  You may feel irritable, sad, or anxious  You may cry easily or for no reason  You may also feel depressed  Depression that continues can be a sign of postpartum depression, a condition that can be treated  Treatment may include talk therapy, medicines, or both  Healthcare providers will ask how you are feeling and if you have any depression  These talks can happen during appointments for your medical care and for your baby's care, such as well child visits  Providers can help you find ways to care for yourself and your baby  Talk to your providers about the following:  · When emotional changes or depression started, and if it is getting worse over time    · Problems you are having with daily activities, sleep, or caring for your baby    · If anything makes you feel worse, or makes you feel better    · Feeling that you are not bonding with your baby the way you want    · Any problems your baby has with sleeping or feeding    · Your baby is fussy or cries a lot    · Support you have from friends, family, or others    Breast care for breastfeeding mothers: You may have sore breasts for 3 to 6 days after you give birth  This happens as your milk begins to fill your breasts  You may also have sore breasts if you do not breastfeed frequently  Do the following to care for your breasts:  · Apply a moist, warm, compress to your breast as directed  This may help soothe your breasts  Make sure the washcloth is not too hot before you apply it to your breast     · Nurse your baby or pump your milk frequently    This may prevent clogged milk ducts  Ask your healthcare provider how often to nurse or pump  · Massage your breasts as directed  This may help increase your milk flow  Gently rub your breasts in a circular motion before you breastfeed  You may need to gently squeeze your breast or nipple to help release milk  You can also use a breast pump to help release milk from your breast     · Wash your breasts with warm water only  Do not put soap on your nipples  Soap may cause your nipples to become dry  · Apply lanolin cream to your nipples as directed  Lanolin cream may add moisture to your skin and prevent nipple dryness  Always  wash off lanolin cream with warm water before you breastfeed  · Place pads in your bra  Your nipples may leak milk when you are not breastfeeding  You can place pads inside of your bra to help prevent leaking onto your clothing  Ask your healthcare provider where to purchase bra pads  · Get breastfeeding support if needed  Healthcare providers can answer questions about breastfeeding and provide you with support  Ask your healthcare provider who you can contact if you need breastfeeding support  Breast care for non-breastfeeding mothers:  Milk will fill your breasts even if you bottle feed your baby  Do the following to help stop your milk from filling your breasts and causing pain:  · Wear a bra with support at all times  A sports bra or a tight-fitting bra will help stop your milk from coming in  · Apply ice on each breast for 15 to 20 minutes every hour or as directed  Use an ice pack, or put crushed ice in a plastic bag  Cover it with a towel before you apply it to your breast  Ice helps your milk ducts shrink  · Keep your breasts away from warm water  Warm water will make it easier for milk to fill your breasts  Stand with your breasts away from warm water in the shower  · Limit how much you touch your breasts  This will prevent them from filling with milk      Perineum care:  Your perineum is the area between your rectum and vagina  It is normal to have swelling and pain in this area after you give birth  If you had an episiotomy, your healthcare provider may give you special instructions  · Clean your perineum after you use the bathroom  This may prevent infection and help with healing  Use a spray bottle with warm water to clean your perineum  You may also gently spray warm water against your perineum when you urinate  Always wipe front to back  · Take a sitz bath as directed  A sitz bath may help relieve swelling and pain  Fill your bath tub or bucket with water up to your hips and sit in the water  Use cold water for 2 days after you deliver  Then use warm water  Ask your healthcare provider for more information about a sitz bath  · Apply ice packs for the first 24 hours or as directed  Use a plastic glove filled with ice or buy an ice pack  Wrap the ice pack or plastic glove in a small towel or wash cloth  Place the ice pack on your perineum for 20 minutes at a time  · Sit on a donut-shaped pillow  This may relieve pressure on your perineum when you sit  · Use wipes that contain medicine or take pills as directed  Your healthcare provider may tell you to use witch hazel pads  You can place witch hazel pads in the refrigerator before you apply them to your perineum  Your provider may also tell you to take NSAIDs  Ask him or her how often to take pills or use the wipes  · Do not go swimming or take tub baths for 4 to 6 weeks or as directed  This will help prevent an infection in your vagina or uterus  Bowel and bladder care: It may take 3 to 5 days to have a bowel movement after you deliver your baby  You can do the following to prevent or manage constipation, and get control of your bowel or bladder:  · Take stool softeners as directed  A stool softener is medicine that will make your bowel movements softer  This may prevent or relieve constipation  A stool softener may also make bowel movements less painful  · Drink plenty of liquids  Ask how much liquid to drink each day and which liquids are best for you  Liquids may help prevent constipation  · Eat foods high in fiber  Examples include fruits, vegetables, grains, beans, and lentils  Ask your healthcare provider how much fiber you need each day  Fiber may prevent constipation  · Do Kegel exercises as directed  Kegel exercises will help strengthen the muscles that control bowel movements and urination  Ask your healthcare provider for more information on Kegel exercises  · Apply cold compresses or medicine to hemorrhoids as directed  This may relieve swelling and pain  Your healthcare provider may tell you to apply ice or wipes that contain medicine to your hemorrhoids  He or she may also tell you to use a sitz bath  Ask your provider for more information on how to manage hemorrhoids  Nutrition:  Good nutrition is important in the postpartum period  It will help you return to a healthy weight, increase your energy levels, and prevent constipation  It will also help you get enough nutrients and calories if you are going to breastfeed your baby  · Eat a variety of healthy foods  Healthy foods include fruits, vegetables, whole-grain breads, low-fat dairy products, beans, lean meats, and fish  You may need 500 to 700 extra calories each day if you breastfeed your baby  You may also need extra protein  · Limit foods with added sugar and high amounts of fat  These foods are high in calories and low in healthy nutrients  Read food labels so you know how much sugar and fat is in the food you want to eat  · Drink 8 to 10 glasses of water per day  Water will help you make plenty of milk for your baby  It will also help prevent constipation  Drink a glass of water every time you breastfeed your baby  · Take vitamins as directed    Ask your healthcare provider what vitamins you need     · Limit caffeine and alcohol if you are breastfeeding  Caffeine and alcohol can get into your breast milk  Caffeine and alcohol can make your baby fussy  They can also interfere with your baby's sleep  Ask your healthcare provider if you can drink alcohol or caffeine  Rest and sleep: You may feel very tired in the postpartum period  Enough sleep will help you heal and give you energy to care for your baby  The following may help you get sleep and rest:  · Nap when your baby naps  Your baby may nap several times during the day  Get rest during this time  · Limit visitors  Many people may want to see you and your baby  Ask friends or family to visit on different days  This will give you time to rest     · Do not plan too much for one day  Put off household chores so that you have time to rest  Gradually do more each day  · Ask for help from family, friends, or neighbors  Ask them to help you with laundry, cleaning, or errands  Also ask someone to watch the baby while you take a nap or relax  Ask your partner to help with the care of your baby  Pump some of your breast milk so your partner can feed your baby during the night  Exercise after delivery:  Wait until your healthcare provider says it is okay to exercise  Exercise can help you lose weight, increase your energy levels, and manage your mood  It can also prevent constipation and blood clots  Start with gentle exercises such as walking  Do more as you have more energy  You may need to avoid abdominal exercises for 1 to 2 weeks after you deliver  Talk to your healthcare provider about an exercise plan that is right for you  Sexual activity after delivery:   · Do not have sex until your healthcare provider says it is okay  You may need to wait 4 to 6 weeks before you have sex  This may prevent infection and allow time to heal     · Your menstrual cycle may begin as soon as 3 weeks after you deliver   Your period may be delayed if you breastfeed your baby  You can become pregnant before you get your first postpartum period  Talk to your healthcare provider about birth control that is right for you  Some types of birth control are not safe during breastfeeding  For support and more information:  Join a support group for new mothers  Ask for help from family and friends with chores, errands, and care of your baby  · Office of Women's Health,  Department of Health and Human Services  [address], [address] [facility]  sudarshan [place] [address]  [address], [address] [facility]  sudarshan [facility] [address]  Phone: 6- [phone]  Web Address: www womenshealth gov    · [facility] Postpartum [address] [place] [address] , [address] [place] [address] Road  [address] [facility] , [address] [place] [address]  Web Address: [email or web address]    Follow up with your doctor or obstetrician as directed: You will need to follow up within 2 to 6 weeks of delivery  Write down your questions so you remember to ask them at your visits  © Copyright [employer] 2022 Information is for End User's use only and may not be sold, redistributed or otherwise used for commercial purposes  All illustrations and images included in CareNotes® are the copyrighted property of A D A M [employer] Inc  or Shelbi Ruiz  The above information is an  only  It is not intended as medical advice for individual conditions or treatments  Talk to your doctor, nurse or pharmacist before following any medical regimen to see if it is safe and effective for you

## 2022-02-27 NOTE — PLAN OF CARE
Problem: POSTPARTUM  Goal: Experiences normal postpartum course  Description: INTERVENTIONS:  - Monitor maternal vital signs  - Assess uterine involution and lochia  Outcome: Adequate for Discharge  Goal: Appropriate maternal -  bonding  Description: INTERVENTIONS:  - Identify family support  - Assess for appropriate maternal/infant bonding   -Encourage maternal/infant bonding opportunities  - Referral to  or  as needed  Outcome: Adequate for Discharge  Goal: Establishment of infant feeding pattern  Description: INTERVENTIONS:  - Assess breast/bottle feeding  - Refer to lactation as needed  Outcome: Adequate for Discharge  Goal: Incision(s), wounds(s) or drain site(s) healing without S/S of infection  Description: INTERVENTIONS  - Assess and document dressing, incision, wound bed, drain sites and surrounding tissue  - Provide patient and family education    Outcome: Adequate for Discharge     Problem: PAIN - ADULT  Goal: Verbalizes/displays adequate comfort level or baseline comfort level  Description: Interventions:  - Encourage patient to monitor pain and request assistance  - Assess pain using appropriate pain scale  - Administer analgesics based on type and severity of pain and evaluate response  - Implement non-pharmacological measures as appropriate and evaluate response  - Consider cultural and social influences on pain and pain management  - Notify physician/advanced practitioner if interventions unsuccessful or patient reports new pain  Outcome: Adequate for Discharge     Problem: INFECTION - ADULT  Goal: Absence or prevention of progression during hospitalization  Description: INTERVENTIONS:  - Assess and monitor for signs and symptoms of infection  - Monitor lab/diagnostic results  - Monitor all insertion sites, i e  indwelling lines, tubes, and drains  - Monitor endotracheal if appropriate and nasal secretions for changes in amount and color  - Eleanor appropriate cooling/warming therapies per order  - Administer medications as ordered  - Instruct and encourage patient and family to use good hand hygiene technique  - Identify and instruct in appropriate isolation precautions for identified infection/condition  Outcome: Adequate for Discharge  Goal: Absence of fever/infection during neutropenic period  Description: INTERVENTIONS:  - Monitor WBC    Outcome: Adequate for Discharge     Problem: SAFETY ADULT  Goal: Patient will remain free of falls  Description: INTERVENTIONS:  - Educate patient/family on patient safety including physical limitations  - Instruct patient to call for assistance with activity   - Consult OT/PT to assist with strengthening/mobility   - Keep Call bell within reach  - Keep bed low and locked with side rails adjusted as appropriate  - Keep care items and personal belongings within reach  - Initiate and maintain comfort rounds    - Consider moving patient to room near nurses station  Outcome: Adequate for Discharge  Goal: Maintain or return to baseline ADL function  Description: INTERVENTIONS:  -  Assess patient's ability to carry out ADLs; assess patient's baseline for ADL function and identify physical deficits which impact ability to perform ADLs (bathing, care of mouth/teeth, toileting, grooming, dressing, etc )  - Assess/evaluate cause of self-care deficits   - Assess range of motion  - Assess patient's mobility; develop plan if impaired  - Assess patient's need for assistive devices and provide as appropriate  - Encourage maximum independence but intervene and supervise when necessary  - Involve family in performance of ADLs  - Assess for home care needs following discharge   - Consider OT consult to assist with ADL evaluation and planning for discharge  - Provide patient education as appropriate  Outcome: Adequate for Discharge  Goal: Maintains/Returns to pre admission functional level  Description: INTERVENTIONS:  - Perform BMAT or MOVE assessment daily    - Set and communicate daily mobility goal to care team and patient/family/caregiver  - Collaborate with rehabilitation services on mobility goals if consulted  - - Record patient progress and toleration of activity level   Outcome: Adequate for Discharge     Problem: Knowledge Deficit  Goal: Patient/family/caregiver demonstrates understanding of disease process, treatment plan, medications, and discharge instructions  Description: Complete learning assessment and assess knowledge base    Interventions:  - Provide teaching at level of understanding  - Provide teaching via preferred learning methods  Outcome: Adequate for Discharge     Problem: DISCHARGE PLANNING  Goal: Discharge to home or other facility with appropriate resources  Description: INTERVENTIONS:  - Identify barriers to discharge w/patient and caregiver  - Arrange for needed discharge resources and transportation as appropriate  - Identify discharge learning needs (meds, wound care, etc )  - Arrange for interpretive services to assist at discharge as needed  - Refer to Case Management Department for coordinating discharge planning if the patient needs post-hospital services based on physician/advanced practitioner order or complex needs related to functional status, cognitive ability, or social support system  Outcome: Adequate for Discharge

## 2022-03-02 ENCOUNTER — OFFICE VISIT (OUTPATIENT)
Dept: POSTPARTUM | Facility: CLINIC | Age: 42
End: 2022-03-02

## 2022-03-02 NOTE — PATIENT INSTRUCTIONS
Pumping whenever Aven is not fed at the breast (or at least every 3 hours during the day with no more than a 5-6 hour break at night)  You should also pump as needed for comfort if Aven nurses at one breast but not the other

## 2022-03-02 NOTE — PROGRESS NOTES
INITIAL BREAST FEEDING EVALUATION    Informant/Relationship: Zuleika and Solomon/ mom and dad    Discussion of General Lactation Issues: Latch in the hospital was very painful and Zuleika developed blisters on her nipples  She is currently pumping and giving expressed breast milk  Nursing is painful for 30 seconds and then improves, but pain never completely goes away  The first 10 seconds of pumping is also painful, but then resolves completely  Zuleika pumped for her first 2, formula fed her twins (by choice with a 3and 1year old) after starting pumping  Pumping was painful and the twins suck was painful  Infant is 11days old today          History:  Fertility Problem:no  Breast changes:yes - enlargement, areolae enlarged and darkened, early sore  : yes - no complications  Full term:yes - 46   labor:no  First nursing/attempt < 1 hour after birth:yes - immediately  Skin to skin following delivery:yes - immediately  Breast changes after delivery:yes - day 4, saw changes in what she is pumping  Rooming in (infant in room with mother with exception of procedures, eg  Circumcision: yes - went to nursery for bath and circumcision  Blood sugar issues:no  NICU stay:no  Jaundice:yes - repeated once  Phototherapy:no  Supplement given: (list supplement and method used as well as reason(s):yes - formula and donor milk via bottle due to pain with latch    Past Medical History:   Diagnosis Date    Abnormal Pap smear of cervix     Ear problems     Heartburn during pregnancy     Migraine     Palpitations     Varicella          Current Outpatient Medications:     acetaminophen (TYLENOL) 325 mg tablet, Take 2 tablets (650 mg total) by mouth every 4 (four) hours as needed for mild pain, Disp: , Rfl: 0    benzocaine-menthol-lanolin-aloe (DERMOPLAST) 20-0 5 % topical spray, Apply 1 application topically every 6 (six) hours as needed for mild pain, Disp: , Rfl: 0    calcium carbonate (TUMS) 500 mg chewable tablet, Chew 2 tablets (1,000 mg total) daily as needed for indigestion or heartburn, Disp: , Rfl: 0    docusate sodium (COLACE) 100 mg capsule, Take 1 capsule (100 mg total) by mouth 2 (two) times a day, Disp: , Rfl: 0    hydrocortisone 1 % cream, Apply 1 application topically daily as needed for irritation, Disp: 30 g, Rfl: 0    ibuprofen (MOTRIN) 600 mg tablet, Take 1 tablet (600 mg total) by mouth every 6 (six) hours, Disp: 30 tablet, Rfl: 0    Prenatal Vit-Fe Fumarate-FA (PRENATAL VITAMIN PO), Take by mouth, Disp: , Rfl:     witch hazel-glycerin (TUCKS) topical pad, Apply 1 pad topically every 4 (four) hours as needed for irritation, Disp: , Rfl: 0  No current facility-administered medications for this visit  No Known Allergies    Social History     Substance and Sexual Activity   Drug Use No       Social History     Interval Breastfeeding History:    Frequency of breast feeding: about 3-4 x/day based on tolerance of pain  Does mother feel breastfeeding is effective:  If no, explain: too painful and he can get fussy like he is not transferring  Does infant appear satisfied after nursing:If no, explain: gets supplemented after every feeding at the breast  Stooling pattern normal: lYes  Urinating frequently:Yes  Using shield or shells: No    Alternative/Artificial Feedings:   Bottle: Yes, Paced bottle feeding  Cup: No  Syringe/Finger: No           Formula Type: Similac ProAdvance                     Amount: 3 oz            Breast Milk:                      Amount: 3 oz (was donor, now just MOM)            Frequency Q 3-4 Hr between feedings during the day, every 2 hours at night  Elimination Problems: No      Equipment:  Nipple Shield             Type: n/a             Size: n/a             Frequency of Use: n/a  Pump            Type: Medela PISA            Frequency of Use: every 4-5 hours during the day, once over night (about 7 hours between sessions)  Shells            Type: n/a Frequency of use: n/a    Equipment Problems: no    Mom:  Breast: Normal and Full, No tender areas  Nipple Assessment in General: Normal: elongated/eraser, no discoloration and no damage noted, but sensitive to the touch  Mother's Awareness of Feeding Cues                 Recognizes: Yes                  Verbalizes: Yes  Support System: FOB  History of Breastfeeding: Pumped for her older two and formula fed her twins due to generally feeling overwhelmed with the two older children  Changes/Stressors/Violence: Painful latch and concerns about how effective Aven is at the breast  Concerns/Goals: Zuleika would like to be able to directly feed Aven    Problems with Mom: Pain with latch    Physical Exam  Constitutional:       Appearance: Normal appearance  She is well-developed and normal weight  HENT:      Head: Normocephalic and atraumatic  Eyes:      Extraocular Movements: Extraocular movements intact  Neck:      Thyroid: No thyromegaly  Cardiovascular:      Rate and Rhythm: Normal rate and regular rhythm  Pulses: Normal pulses  Heart sounds: Normal heart sounds  No murmur heard  Pulmonary:      Effort: Pulmonary effort is normal       Breath sounds: Normal breath sounds  Musculoskeletal:         General: No swelling or tenderness  Normal range of motion  Cervical back: Normal range of motion and neck supple  Right lower leg: No edema  Left lower leg: No edema  Lymphadenopathy:      Cervical: No cervical adenopathy  Upper Body:      Right upper body: No pectoral adenopathy  Left upper body: No pectoral adenopathy  Neurological:      General: No focal deficit present  Mental Status: She is alert and oriented to person, place, and time  Psychiatric:         Mood and Affect: Mood normal          Behavior: Behavior normal          Thought Content: Thought content normal          Judgment: Judgment normal    Vitals and nursing note reviewed  Infant:  Behaviors: Alert  Color: Healthy  Birth weight: 3 567 kg  Current weight: 3 545 kg    Problems with infant: Restricted tongue movement      General Appearance:  Alert, active, no distress                             Head:  Normocephalic, AFOF, sutures opposed                             Eyes:  Conjunctiva clear, no drainage                              Ears:  Normally placed, no anomolies                             Nose:  Septum intact, no drainage or erythema                           Mouth:  No lesions; palate is a slightly high and arched; tongue does not extend beyond the lower alveolar ridge and has decreased lateralization bilaterally; when crying the tongue is pulled down instead of showing lift; there is poor cupping of the examiner's finger and limited peristalsis with more of a piston-like movement; tongue compresses the examiner's finger against the palate posteriorly; frenulum is thin and short with attachment in the middle of the tongue and at the base of the lower alveolar ridge and limits both passive lift and passive gape  Neck:  Supple, symmetrical, trachea midline, no adenopathy; thyroid: no enlargement, symmetric, no tenderness/mass/nodules                 Respiratory:  No grunting, flaring, retractions, breath sounds clear and equal            Cardiovascular:  Regular rate and rhythm  No murmur  Adequate perfusion/capillary refill   Femoral pulse present                    Abdomen:   Soft, non-tender, no masses, bowel sounds present, no HSM             Genitourinary:  Normal male, testes descended, no discharge, swelling, or pain, anus patent                          Spine:   No abnormalities noted        Musculoskeletal:  Full range of motion          Skin/Hair/Nails:   Skin warm, dry, and intact, no rashes or abnormal dyspigmentation or lesions                Neurologic:   No abnormal movement, tone appropriate for gestational age     Latch:  Efficiency:               Lips Flanged: Yes, after frenotomy              Depth of latch: Good, following frenotomy              Audible Swallow: Yes, after frenotomy              Visible Milk: Yes, after frenotomy              Wide Open/ Asymmetrical: Yes, after frenotomy              Suck Swallow Cycle: Breathing: Unlabored, Coordinated: Yes  Nipple Assessment after latch: Normal: elongated/eraser, no discoloration and no damage noted  Latch Problems: After the frenotomy, Dulce Jones is able to assist Aven to a wider, deeper, more asymmetrical, and more comfortable latch  Evelyn Castellanos does develop a few sustained SSB and nurses for a short time  He still cues additionally and then takes a little more using paced bottle feeding  Position:  Infant's Ergonomics/Body               Body Alignment: Yes               Head Supported: Yes               Close to Mom's body/ Lifted/ Supported: Yes               Mom's Ergonomics/Body: Yes                           Supported: Yes                           Sitting Back: Yes                           Brings Baby to her breast: Yes  Positioning Problems: None        Education:  Reviewed Latch: Reviewed how to gently compress the breast as if offering a sandwich to facilitate a deeper latch  Reviewed Positioning for Dyad: Reviewed how to bring baby to the breast so that his lower lip and chin touch the breast with his nose just above the nipple to encourage a wider, more asymmetric latch    Reviewed Frequency/Supply & Demand: Recommended feeding on demand: when the baby gives hunger cues, when the breasts feel full, every 3 hours during the day and every 5 hours at night counting from the beginning of one feeding to the beginning of the next; whichever comes first    Reviewed Alternative/Artificial Feedings: Paced bottle feeding, encouraged starting with an empty nipple  Reviewed Mom/Breast care: Pumping the breast appropriately to maintain milk production, at least when the baby is not fed at the breast or has a less than adequate feeding  Plan:  Discussed history and physical exams with Crystal  Support given for her commitment to providing breast milk for her baby  Discussed the findings on the baby's exam consistent with tongue tie and reviewed how this may be the cause of nipple trauma, nipple pain, nipple damage, poor milk transfer, blocked ducts, mastitis, and loss of milk production  Discussed the science that supports performing the frenotomy to improve latch  I have spent 40 minutes with Patient and family today in which greater than 50% of this time was spent in counseling/coordination of care regarding Prognosis, Risks and benefits of tx options, Intructions for management, Patient and family education and Impressions

## 2022-03-04 LAB — PLACENTA IN STORAGE: NORMAL

## 2022-03-07 ENCOUNTER — TELEPHONE (OUTPATIENT)
Dept: OBGYN CLINIC | Facility: CLINIC | Age: 42
End: 2022-03-07

## 2022-03-07 NOTE — TELEPHONE ENCOUNTER
Delivered 11 days ago  Looking for possible progesterone only due to h/o migraines in the past  Wasn't able to get in the hospital before discharge

## 2022-03-21 ENCOUNTER — POSTPARTUM VISIT (OUTPATIENT)
Dept: OBGYN CLINIC | Facility: CLINIC | Age: 42
End: 2022-03-21

## 2022-03-21 VITALS
BODY MASS INDEX: 30.65 KG/M2 | WEIGHT: 202.2 LBS | DIASTOLIC BLOOD PRESSURE: 76 MMHG | SYSTOLIC BLOOD PRESSURE: 128 MMHG | HEIGHT: 68 IN

## 2022-03-21 DIAGNOSIS — Z30.09 ENCOUNTER FOR OTHER GENERAL COUNSELING OR ADVICE ON CONTRACEPTION: Primary | ICD-10-CM

## 2022-03-21 PROCEDURE — 99024 POSTOP FOLLOW-UP VISIT: CPT | Performed by: PHYSICIAN ASSISTANT

## 2022-03-21 RX ORDER — NORETHINDRONE ACETATE AND ETHINYL ESTRADIOL 1; .02 MG/1; MG/1
1 TABLET ORAL DAILY
Qty: 28 TABLET | Refills: 3 | Status: SHIPPED | OUTPATIENT
Start: 2022-03-21 | End: 2022-05-10 | Stop reason: HOSPADM

## 2022-03-21 NOTE — PROGRESS NOTES
OB POSTPARTUM VISIT PROGRESS NOTE  Date of Encounter: 3/21/2022    Jason Bain    : 1980  (46 y o )  MR: [id number]    Subjective   Zuleika is in for her postpartum visit  She is now [id number]  She delivered by normal spontaneous vaginal delivery  She's generally doing well, denies current pain or bleeding issues, and has no significant depression issues  She just stopped breast feedign yesterday  We discussed all appropriate contraceptive options and she chooses OCPs  She desires to plan TL as well  This pregnancy occurred after a failed vasectomy  Pt has recently stopped BF as baby was very colicy and is doing much better on low allergen formula  She does note hemorrhoids  Will plan OTC tx as she has not yet trialed preparation H  If no resolve, will let us know  We did review colorectal for follow up if she desires removal as well  EPDS:7  Pt feels as if her sx are well controlled and she is not concerned about pp depression  She did experience that after the birth of her first child  Objective   EXAM:  GENERAL: alert, well appearing, and in no distress  VITALS: Blood pressure 128/76, height 5' 8" (1 727 m), weight 91 7 kg (202 lb 3 2 oz), last menstrual period 2021, currently breastfeeding  BMI: Body mass index is 30 74 kg/m²  HEART: RRR  LUNGS: Clear to auscultation  BACK: Normal spine, no CVAT  BREASTS: Deferred  ABDOMEN: Regular  EXTREMITIES: All normal   PELVIC:   VULVA: Nml EGBUS  VAGINA: Normal, no discharge  Introitus well healed, slightly tender  CERVIX: Normal, no discharge  UTERUS: Anteverted, NSSC, Mobile, NT    RIGHT ADNEXUM: Nontender, no mass  LEFT ADNEXUM: Nontender, no mass  RECTAL: Deferred  Assessment/Plan   Diagnoses and all orders for this visit:    Encounter for other general counseling or advice on contraception  -     norethindrone-ethinyl estradiol (Junel 1/20) 1-20 MG-MCG per tablet;  Take 1 tablet by mouth daily      Will satrt OCPs with next cycle  Pt desires combo OCPs for birth control  I reviewed with pt estrogen/progesterone combo and how it works to prevent pregnancy  Pt aware of importance of taking her pill daily at the same time every day to maximize effectiveness  Aware to use a back up method of birth control with any missed pills or any antibiotic use  Pt is aware of risks of estrogen use and higher clotting risks  Aware will need continued barrier method use such as condoms to decrease STD risk  Pt aware of possible side effects including, but not limited to, headaches, acne, bloating, irregular menses, mood changes and breast tenderness  Pt aware to start with her next menstrual cycle on the fist Sunday of her cycle  Aware of need for back up method of birth control during her first month of pill use  All questions answered  Will then plan b/l salpingectomy       Bella Toussaint PA-C

## 2022-04-01 ENCOUNTER — PREP FOR PROCEDURE (OUTPATIENT)
Dept: OBGYN CLINIC | Facility: CLINIC | Age: 42
End: 2022-04-01

## 2022-04-01 DIAGNOSIS — Z30.2 ENCOUNTER FOR STERILIZATION: Primary | ICD-10-CM

## 2022-04-08 ENCOUNTER — TELEPHONE (OUTPATIENT)
Dept: OBGYN CLINIC | Facility: CLINIC | Age: 42
End: 2022-04-08

## 2022-04-26 ENCOUNTER — OFFICE VISIT (OUTPATIENT)
Dept: OBGYN CLINIC | Facility: CLINIC | Age: 42
End: 2022-04-26

## 2022-04-26 VITALS
SYSTOLIC BLOOD PRESSURE: 136 MMHG | WEIGHT: 189 LBS | HEIGHT: 68 IN | BODY MASS INDEX: 28.64 KG/M2 | DIASTOLIC BLOOD PRESSURE: 76 MMHG

## 2022-04-26 DIAGNOSIS — Z01.818 PRE-OP EXAM: Primary | ICD-10-CM

## 2022-04-26 PROBLEM — Z3A.39 39 WEEKS GESTATION OF PREGNANCY: Status: RESOLVED | Noted: 2021-08-31 | Resolved: 2022-04-26

## 2022-04-26 PROBLEM — Z34.83 PRENATAL CARE, SUBSEQUENT PREGNANCY, THIRD TRIMESTER: Status: RESOLVED | Noted: 2022-01-05 | Resolved: 2022-04-26

## 2022-04-26 PROCEDURE — PREOP: Performed by: STUDENT IN AN ORGANIZED HEALTH CARE EDUCATION/TRAINING PROGRAM

## 2022-04-26 RX ORDER — SODIUM CHLORIDE, SODIUM LACTATE, POTASSIUM CHLORIDE, CALCIUM CHLORIDE 600; 310; 30; 20 MG/100ML; MG/100ML; MG/100ML; MG/100ML
125 INJECTION, SOLUTION INTRAVENOUS CONTINUOUS
Status: CANCELLED | OUTPATIENT
Start: 2022-04-26

## 2022-04-26 RX ORDER — ACETAMINOPHEN 325 MG/1
975 TABLET ORAL ONCE
Status: CANCELLED | OUTPATIENT
Start: 2022-04-26 | End: 2022-04-26

## 2022-04-26 NOTE — H&P
GYN Surgical History & Physical  Eusebia Garza 46 y o  female MRN: [id number]  Unit/Bed#:  Encounter: [id number]    HPI:  Eusebia Garza is a 46 y o  [id number] who presents for surgical evaluation and discussion of permanent sterilization via laparoscopic salpingectomy  Patient is currently not sexually active but desires permanent sterilization as she is 100% positive that she no longer desires more children  She declines any other form of LARC and her last pregnancy was a result of a failed vasectomy  She denies any concerns and very much so desires permanent sterilization      Pap 2019: NILM    Active Problems:  Patient Active Problem List   Diagnosis    Anxiety state    Esophageal reflux    Left breast lump    Nasal septal deviation    Nasal turbinate hypertrophy    39 weeks gestation of pregnancy    Family history of genetic disease    Prenatal care, subsequent pregnancy, third trimester    H/O postpartum hemorrhage, currently pregnant, third trimester    Migraine     (spontaneous vaginal delivery)     PMH:  Past Medical History:   Diagnosis Date    Abnormal Pap smear of cervix     Ear problems     Heartburn during pregnancy     Migraine     Palpitations     Varicella      PSH:  Past Surgical History:   Procedure Laterality Date    BREAST CYST ASPIRATION Left 2014    cyst asp     DENTAL SURGERY      DILATION AND CURETTAGE OF UTERUS      NASAL SEPTUM SURGERY       Social Hx:  Denies x3    OB Hx:  OB History    Para Term  AB Living   8 4 3 1 4 5   SAB IAB Ectopic Multiple Live Births   2 0 0 1 5      # Outcome Date GA Lbr Jam/2nd Weight Sex Delivery Anes PTL Lv   8 Term 22 39w0d / 00:03 3670 g (8 lb 1 5 oz) M Vag-Spont EPI N TIFF      Name: Jesse LACY)      Apgar1: 8  Apgar5: 8   7A  17 36w0d / 00:02 2960 g (6 lb 8 4 oz) M Vag-Spont EPI Y TIFF      Name: Dorian Mantel: 9  Apgar5: 9   7B  17 36w0d / 00:22 2635 g (5 lb 13 oz) M Vag-Spont EPI Y TIFF      Name: Di PEREA      Apgar1: 9  Apgar5: 9   6 2016           5 Term 14 40w0d  3629 g (8 lb) F Vag-Spont EPI  TIFF      Birth Comments: postpartum hemorrhage   4 Term 13 40w0d  4082 g (9 lb) F Vag-Spont EPI  TIFF   3 2012           2 AB            1 AB                Meds:  Current Outpatient Medications on File Prior to Visit   Medication Sig Dispense Refill    acetaminophen (TYLENOL) 325 mg tablet Take 2 tablets (650 mg total) by mouth every 4 (four) hours as needed for mild pain  0    ibuprofen (MOTRIN) 600 mg tablet Take 1 tablet (600 mg total) by mouth every 6 (six) hours 30 tablet 0    norethindrone-ethinyl estradiol (Junel ) 1-20 MG-MCG per tablet Take 1 tablet by mouth daily 28 tablet 3    Prenatal Multivit-Min-Fe-FA (Jenliva Prenatal/) 1 MG CAPS Take by mouth      Prenatal Vit-Fe Fumarate-FA (PRENATAL VITAMIN PO) Take by mouth (Patient not taking: Reported on 2022 )      witch hazel-glycerin (TUCKS) topical pad Apply 1 pad topically every 4 (four) hours as needed for irritation (Patient not taking: Reported on 2022 )  0     No current facility-administered medications on file prior to visit  Allergies:  No Known Allergies    Physical Exam:  /76 (BP Location: Left arm, Patient Position: Sitting, Cuff Size: Standard)   Ht 5' 8" (1 727 m)   Wt 85 7 kg (189 lb)   LMP 2022   BMI 28 74 kg/m²     Physical Exam  Vitals reviewed  Constitutional:       General: She is not in acute distress  Appearance: Normal appearance  She is well-developed  She is not ill-appearing or diaphoretic  HENT:      Head: Normocephalic and atraumatic  Cardiovascular:      Rate and Rhythm: Normal rate and regular rhythm  Heart sounds: Normal heart sounds  No murmur heard  No friction rub  No gallop  Pulmonary:      Effort: Pulmonary effort is normal  No respiratory distress        Breath sounds: Normal breath sounds  No wheezing or rales  Abdominal:      General: Abdomen is flat  Palpations: Abdomen is soft  There is no mass  Tenderness: There is no abdominal tenderness  There is no guarding or rebound  Hernia: No hernia is present  Genitourinary:     Vagina: Normal    Musculoskeletal:         General: No swelling or tenderness  Cervical back: Normal range of motion and neck supple  Right lower leg: No edema  Left lower leg: No edema  Skin:     General: Skin is warm and dry  Neurological:      Mental Status: She is alert and oriented to person, place, and time  Psychiatric:         Mood and Affect: Mood normal          Behavior: Behavior normal          Assessment/ Plan  46 y o  [id number] desiring permanent sterilization- Alternatives, risks and benefits to bilateral salpingectomy or removal of both fallopian tubes were discussed in detail  Alternative contraceptive treatments including vasectomy, LARCs, Depo Provera, OCPs, patch, ring, POPs, condoms were discussed and patient is 100% sure she would like a bilateral salpingectomy as her form of contraception  Patient understands that this is a permanent and irreversible procedure  We discussed the theoretical benefit of salpingectomy in the reduction of ovarian cancer  Risks of infection, bleeding, injury to surrounding structures such as bladder, bowel, uterus, ureters and neurovasculature of pelvis, failure to perform procedure, possibility of an open laparotomy procedure if injury occurs with further surgery, cardiac events, stroke, VTE were all reviewed  Procedure was reviewed with patient and all questions were answered  Patient is agreeable to proceed with bilateral tubal ligation via salpingectomy  Informed consent was obtained  Reviewed same day procedure, procedure itself at length, post-op expectations  Surgical wash given and instructions reviewed    Patient to complete preop labs  All questions and concerns addressed to the best of my ability by end of visit      Stephie Tompkins MD  04/26/22

## 2022-04-26 NOTE — H&P (VIEW-ONLY)
GYN Surgical History & Physical  Segun Sullivan 46 y o  female MRN: [id number]  Unit/Bed#:  Encounter: [id number]    HPI:  Segun uSllivan is a 46 y o  [id number] who presents for surgical evaluation and discussion of permanent sterilization via laparoscopic salpingectomy  Patient is currently not sexually active but desires permanent sterilization as she is 100% positive that she no longer desires more children  She declines any other form of LARC and her last pregnancy was a result of a failed vasectomy  She denies any concerns and very much so desires permanent sterilization      Pap 2019: NILM    Active Problems:  Patient Active Problem List   Diagnosis    Anxiety state    Esophageal reflux    Left breast lump    Nasal septal deviation    Nasal turbinate hypertrophy    39 weeks gestation of pregnancy    Family history of genetic disease    Prenatal care, subsequent pregnancy, third trimester    H/O postpartum hemorrhage, currently pregnant, third trimester    Migraine     (spontaneous vaginal delivery)     PMH:  Past Medical History:   Diagnosis Date    Abnormal Pap smear of cervix     Ear problems     Heartburn during pregnancy     Migraine     Palpitations     Varicella      PSH:  Past Surgical History:   Procedure Laterality Date    BREAST CYST ASPIRATION Left 2014    cyst asp     DENTAL SURGERY      DILATION AND CURETTAGE OF UTERUS      NASAL SEPTUM SURGERY       Social Hx:  Denies x3    OB Hx:  OB History    Para Term  AB Living   8 4 3 1 4 5   SAB IAB Ectopic Multiple Live Births   2 0 0 1 5      # Outcome Date GA Lbr Jam/2nd Weight Sex Delivery Anes PTL Lv   8 Term 22 39w0d / 00:03 3670 g (8 lb 1 5 oz) M Vag-Spont EPI N TIFF      Name: Maki Bland (BRIT)      Apgar1: 8  Apgar5: 8   7A  17 36w0d / 00:02 2960 g (6 lb 8 4 oz) M Vag-Spont EPI Y TIFF      Name: Ruth Santillan: 9  Apgar5: 9   7B  17 36w0d / 00:22 2635 g (5 lb 13 oz) M Vag-Spont EPI Y TIFF      Name: Di PEREA      Apgar1: 9  Apgar5: 9   6 2016           5 Term 14 40w0d  3629 g (8 lb) F Vag-Spont EPI  TIFF      Birth Comments: postpartum hemorrhage   4 Term 13 40w0d  4082 g (9 lb) F Vag-Spont EPI  TIFF   3 2012           2 AB            1 AB                Meds:  Current Outpatient Medications on File Prior to Visit   Medication Sig Dispense Refill    acetaminophen (TYLENOL) 325 mg tablet Take 2 tablets (650 mg total) by mouth every 4 (four) hours as needed for mild pain  0    ibuprofen (MOTRIN) 600 mg tablet Take 1 tablet (600 mg total) by mouth every 6 (six) hours 30 tablet 0    norethindrone-ethinyl estradiol (Junel ) 1-20 MG-MCG per tablet Take 1 tablet by mouth daily 28 tablet 3    Prenatal Multivit-Min-Fe-FA (Jenliva Prenatal/) 1 MG CAPS Take by mouth      Prenatal Vit-Fe Fumarate-FA (PRENATAL VITAMIN PO) Take by mouth (Patient not taking: Reported on 2022 )      witch hazel-glycerin (TUCKS) topical pad Apply 1 pad topically every 4 (four) hours as needed for irritation (Patient not taking: Reported on 2022 )  0     No current facility-administered medications on file prior to visit  Allergies:  No Known Allergies    Physical Exam:  /76 (BP Location: Left arm, Patient Position: Sitting, Cuff Size: Standard)   Ht 5' 8" (1 727 m)   Wt 85 7 kg (189 lb)   LMP 2022   BMI 28 74 kg/m²     Physical Exam  Vitals reviewed  Constitutional:       General: She is not in acute distress  Appearance: Normal appearance  She is well-developed  She is not ill-appearing or diaphoretic  HENT:      Head: Normocephalic and atraumatic  Cardiovascular:      Rate and Rhythm: Normal rate and regular rhythm  Heart sounds: Normal heart sounds  No murmur heard  No friction rub  No gallop  Pulmonary:      Effort: Pulmonary effort is normal  No respiratory distress        Breath sounds: Normal breath sounds  No wheezing or rales  Abdominal:      General: Abdomen is flat  Palpations: Abdomen is soft  There is no mass  Tenderness: There is no abdominal tenderness  There is no guarding or rebound  Hernia: No hernia is present  Genitourinary:     Vagina: Normal    Musculoskeletal:         General: No swelling or tenderness  Cervical back: Normal range of motion and neck supple  Right lower leg: No edema  Left lower leg: No edema  Skin:     General: Skin is warm and dry  Neurological:      Mental Status: She is alert and oriented to person, place, and time  Psychiatric:         Mood and Affect: Mood normal          Behavior: Behavior normal          Assessment/ Plan  46 y o  [id number] desiring permanent sterilization- Alternatives, risks and benefits to bilateral salpingectomy or removal of both fallopian tubes were discussed in detail  Alternative contraceptive treatments including vasectomy, LARCs, Depo Provera, OCPs, patch, ring, POPs, condoms were discussed and patient is 100% sure she would like a bilateral salpingectomy as her form of contraception  Patient understands that this is a permanent and irreversible procedure  We discussed the theoretical benefit of salpingectomy in the reduction of ovarian cancer  Risks of infection, bleeding, injury to surrounding structures such as bladder, bowel, uterus, ureters and neurovasculature of pelvis, failure to perform procedure, possibility of an open laparotomy procedure if injury occurs with further surgery, cardiac events, stroke, VTE were all reviewed  Procedure was reviewed with patient and all questions were answered  Patient is agreeable to proceed with bilateral tubal ligation via salpingectomy  Informed consent was obtained  Reviewed same day procedure, procedure itself at length, post-op expectations  Surgical wash given and instructions reviewed    Patient to complete preop labs  All questions and concerns addressed to the best of my ability by end of visit      Janet Bernabe MD  04/26/22

## 2022-04-27 LAB
ABO GROUP BLD: NORMAL
BASOPHILS # BLD AUTO: 22 CELLS/UL (ref 0–200)
BASOPHILS NFR BLD AUTO: 0.3 %
BLD GP AB SCN SERPL QL: NORMAL
EOSINOPHIL # BLD AUTO: 43 CELLS/UL (ref 15–500)
EOSINOPHIL NFR BLD AUTO: 0.6 %
ERYTHROCYTE [DISTWIDTH] IN BLOOD BY AUTOMATED COUNT: 12.3 % (ref 11–15)
HCT VFR BLD AUTO: 35.6 % (ref 35–45)
HGB BLD-MCNC: 12.3 G/DL (ref 11.7–15.5)
LYMPHOCYTES # BLD AUTO: 2095 CELLS/UL (ref 850–3900)
LYMPHOCYTES NFR BLD AUTO: 29.1 %
MCH RBC QN AUTO: 32.2 PG (ref 27–33)
MCHC RBC AUTO-ENTMCNC: 34.6 G/DL (ref 32–36)
MCV RBC AUTO: 93.2 FL (ref 80–100)
MONOCYTES # BLD AUTO: 403 CELLS/UL (ref 200–950)
MONOCYTES NFR BLD AUTO: 5.6 %
NEUTROPHILS # BLD AUTO: 4637 CELLS/UL (ref 1500–7800)
NEUTROPHILS NFR BLD AUTO: 64.4 %
PLATELET # BLD AUTO: 281 THOUSAND/UL (ref 140–400)
PMV BLD REES-ECKER: 9.9 FL (ref 7.5–12.5)
RBC # BLD AUTO: 3.82 MILLION/UL (ref 3.8–5.1)
RH BLD: NORMAL
WBC # BLD AUTO: 7.2 THOUSAND/UL (ref 3.8–10.8)

## 2022-04-29 ENCOUNTER — ANESTHESIA EVENT (OUTPATIENT)
Dept: PERIOP | Facility: HOSPITAL | Age: 42
End: 2022-04-29
Payer: COMMERCIAL

## 2022-05-10 ENCOUNTER — ANESTHESIA (OUTPATIENT)
Dept: PERIOP | Facility: HOSPITAL | Age: 42
End: 2022-05-10
Payer: COMMERCIAL

## 2022-05-10 ENCOUNTER — HOSPITAL ENCOUNTER (OUTPATIENT)
Facility: HOSPITAL | Age: 42
Setting detail: OUTPATIENT SURGERY
Discharge: HOME | End: 2022-05-10
Attending: STUDENT IN AN ORGANIZED HEALTH CARE EDUCATION/TRAINING PROGRAM | Admitting: STUDENT IN AN ORGANIZED HEALTH CARE EDUCATION/TRAINING PROGRAM
Payer: COMMERCIAL

## 2022-05-10 VITALS
SYSTOLIC BLOOD PRESSURE: 119 MMHG | DIASTOLIC BLOOD PRESSURE: 58 MMHG | BODY MASS INDEX: 28.64 KG/M2 | HEIGHT: 68 IN | WEIGHT: 189 LBS | OXYGEN SATURATION: 100 % | HEART RATE: 55 BPM | RESPIRATION RATE: 16 BRPM | TEMPERATURE: 96.8 F

## 2022-05-10 DIAGNOSIS — Z30.2 ENCOUNTER FOR STERILIZATION: ICD-10-CM

## 2022-05-10 DIAGNOSIS — G89.18 POST-OP PAIN: Primary | ICD-10-CM

## 2022-05-10 PROBLEM — Z90.79 STATUS POST BILATERAL SALPINGECTOMY: Status: ACTIVE | Noted: 2022-05-10

## 2022-05-10 LAB
ABO GROUP BLD: NORMAL
BLD GP AB SCN SERPL QL: NEGATIVE
EXT PREGNANCY TEST URINE: NEGATIVE
EXT. CONTROL: NORMAL
RH BLD: POSITIVE
SPECIMEN EXPIRATION DATE: NORMAL

## 2022-05-10 PROCEDURE — 86900 BLOOD TYPING SEROLOGIC ABO: CPT | Performed by: STUDENT IN AN ORGANIZED HEALTH CARE EDUCATION/TRAINING PROGRAM

## 2022-05-10 PROCEDURE — 58661 LAPAROSCOPY REMOVE ADNEXA: CPT | Performed by: STUDENT IN AN ORGANIZED HEALTH CARE EDUCATION/TRAINING PROGRAM

## 2022-05-10 PROCEDURE — 86901 BLOOD TYPING SEROLOGIC RH(D): CPT | Performed by: STUDENT IN AN ORGANIZED HEALTH CARE EDUCATION/TRAINING PROGRAM

## 2022-05-10 PROCEDURE — 88302 TISSUE EXAM BY PATHOLOGIST: CPT | Performed by: PATHOLOGY

## 2022-05-10 PROCEDURE — 86850 RBC ANTIBODY SCREEN: CPT | Performed by: STUDENT IN AN ORGANIZED HEALTH CARE EDUCATION/TRAINING PROGRAM

## 2022-05-10 RX ORDER — ONDANSETRON 2 MG/ML
4 INJECTION INTRAMUSCULAR; INTRAVENOUS EVERY 6 HOURS PRN
Status: DISCONTINUED | OUTPATIENT
Start: 2022-05-10 | End: 2022-05-10 | Stop reason: HOSPADM

## 2022-05-10 RX ORDER — ONDANSETRON 2 MG/ML
INJECTION INTRAMUSCULAR; INTRAVENOUS AS NEEDED
Status: DISCONTINUED | OUTPATIENT
Start: 2022-05-10 | End: 2022-05-10

## 2022-05-10 RX ORDER — SODIUM CHLORIDE, SODIUM LACTATE, POTASSIUM CHLORIDE, CALCIUM CHLORIDE 600; 310; 30; 20 MG/100ML; MG/100ML; MG/100ML; MG/100ML
125 INJECTION, SOLUTION INTRAVENOUS CONTINUOUS
Status: DISCONTINUED | OUTPATIENT
Start: 2022-05-10 | End: 2022-05-10

## 2022-05-10 RX ORDER — ACETAMINOPHEN 325 MG/1
975 TABLET ORAL EVERY 6 HOURS PRN
Status: DISCONTINUED | OUTPATIENT
Start: 2022-05-10 | End: 2022-05-10 | Stop reason: HOSPADM

## 2022-05-10 RX ORDER — MAGNESIUM HYDROXIDE 1200 MG/15ML
LIQUID ORAL AS NEEDED
Status: DISCONTINUED | OUTPATIENT
Start: 2022-05-10 | End: 2022-05-10 | Stop reason: HOSPADM

## 2022-05-10 RX ORDER — FENTANYL CITRATE/PF 50 MCG/ML
25 SYRINGE (ML) INJECTION
Status: DISCONTINUED | OUTPATIENT
Start: 2022-05-10 | End: 2022-05-10 | Stop reason: HOSPADM

## 2022-05-10 RX ORDER — PROMETHAZINE HYDROCHLORIDE 25 MG/ML
12.5 INJECTION, SOLUTION INTRAMUSCULAR; INTRAVENOUS ONCE AS NEEDED
Status: DISCONTINUED | OUTPATIENT
Start: 2022-05-10 | End: 2022-05-10 | Stop reason: HOSPADM

## 2022-05-10 RX ORDER — PROPOFOL 10 MG/ML
INJECTION, EMULSION INTRAVENOUS AS NEEDED
Status: DISCONTINUED | OUTPATIENT
Start: 2022-05-10 | End: 2022-05-10

## 2022-05-10 RX ORDER — LIDOCAINE HYDROCHLORIDE 10 MG/ML
0.5 INJECTION, SOLUTION EPIDURAL; INFILTRATION; INTRACAUDAL; PERINEURAL ONCE AS NEEDED
Status: DISCONTINUED | OUTPATIENT
Start: 2022-05-10 | End: 2022-05-10

## 2022-05-10 RX ORDER — GLYCOPYRROLATE 0.2 MG/ML
INJECTION INTRAMUSCULAR; INTRAVENOUS AS NEEDED
Status: DISCONTINUED | OUTPATIENT
Start: 2022-05-10 | End: 2022-05-10

## 2022-05-10 RX ORDER — OXYCODONE HYDROCHLORIDE 10 MG/1
10 TABLET ORAL EVERY 4 HOURS PRN
Status: DISCONTINUED | OUTPATIENT
Start: 2022-05-10 | End: 2022-05-10 | Stop reason: HOSPADM

## 2022-05-10 RX ORDER — MEPERIDINE HYDROCHLORIDE 25 MG/ML
12.5 INJECTION INTRAMUSCULAR; INTRAVENOUS; SUBCUTANEOUS ONCE
Status: DISCONTINUED | OUTPATIENT
Start: 2022-05-10 | End: 2022-05-10 | Stop reason: HOSPADM

## 2022-05-10 RX ORDER — BUPIVACAINE HYDROCHLORIDE 2.5 MG/ML
INJECTION, SOLUTION EPIDURAL; INFILTRATION; INTRACAUDAL AS NEEDED
Status: DISCONTINUED | OUTPATIENT
Start: 2022-05-10 | End: 2022-05-10 | Stop reason: HOSPADM

## 2022-05-10 RX ORDER — KETOROLAC TROMETHAMINE 30 MG/ML
INJECTION, SOLUTION INTRAMUSCULAR; INTRAVENOUS AS NEEDED
Status: DISCONTINUED | OUTPATIENT
Start: 2022-05-10 | End: 2022-05-10

## 2022-05-10 RX ORDER — ROCURONIUM BROMIDE 10 MG/ML
INJECTION, SOLUTION INTRAVENOUS AS NEEDED
Status: DISCONTINUED | OUTPATIENT
Start: 2022-05-10 | End: 2022-05-10

## 2022-05-10 RX ORDER — DEXAMETHASONE SODIUM PHOSPHATE 10 MG/ML
INJECTION, SOLUTION INTRAMUSCULAR; INTRAVENOUS AS NEEDED
Status: DISCONTINUED | OUTPATIENT
Start: 2022-05-10 | End: 2022-05-10

## 2022-05-10 RX ORDER — FENTANYL CITRATE 50 UG/ML
INJECTION, SOLUTION INTRAMUSCULAR; INTRAVENOUS AS NEEDED
Status: DISCONTINUED | OUTPATIENT
Start: 2022-05-10 | End: 2022-05-10

## 2022-05-10 RX ORDER — ONDANSETRON 2 MG/ML
4 INJECTION INTRAMUSCULAR; INTRAVENOUS ONCE AS NEEDED
Status: DISCONTINUED | OUTPATIENT
Start: 2022-05-10 | End: 2022-05-10 | Stop reason: HOSPADM

## 2022-05-10 RX ORDER — MIDAZOLAM HYDROCHLORIDE 2 MG/2ML
INJECTION, SOLUTION INTRAMUSCULAR; INTRAVENOUS AS NEEDED
Status: DISCONTINUED | OUTPATIENT
Start: 2022-05-10 | End: 2022-05-10

## 2022-05-10 RX ORDER — ACETAMINOPHEN 325 MG/1
975 TABLET ORAL ONCE
Status: COMPLETED | OUTPATIENT
Start: 2022-05-10 | End: 2022-05-10

## 2022-05-10 RX ORDER — NEOSTIGMINE METHYLSULFATE 1 MG/ML
INJECTION INTRAVENOUS AS NEEDED
Status: DISCONTINUED | OUTPATIENT
Start: 2022-05-10 | End: 2022-05-10

## 2022-05-10 RX ORDER — ALBUTEROL SULFATE 2.5 MG/3ML
2.5 SOLUTION RESPIRATORY (INHALATION) ONCE AS NEEDED
Status: DISCONTINUED | OUTPATIENT
Start: 2022-05-10 | End: 2022-05-10 | Stop reason: HOSPADM

## 2022-05-10 RX ORDER — LABETALOL HYDROCHLORIDE 5 MG/ML
5 INJECTION, SOLUTION INTRAVENOUS
Status: DISCONTINUED | OUTPATIENT
Start: 2022-05-10 | End: 2022-05-10 | Stop reason: HOSPADM

## 2022-05-10 RX ORDER — OXYCODONE HYDROCHLORIDE AND ACETAMINOPHEN 5; 325 MG/1; MG/1
1 TABLET ORAL EVERY 4 HOURS PRN
Qty: 7 TABLET | Refills: 0 | Status: SHIPPED | OUTPATIENT
Start: 2022-05-10 | End: 2022-05-20

## 2022-05-10 RX ORDER — OXYCODONE HYDROCHLORIDE 5 MG/1
5 TABLET ORAL EVERY 4 HOURS PRN
Status: DISCONTINUED | OUTPATIENT
Start: 2022-05-10 | End: 2022-05-10 | Stop reason: HOSPADM

## 2022-05-10 RX ORDER — SODIUM CHLORIDE, SODIUM LACTATE, POTASSIUM CHLORIDE, CALCIUM CHLORIDE 600; 310; 30; 20 MG/100ML; MG/100ML; MG/100ML; MG/100ML
INJECTION, SOLUTION INTRAVENOUS CONTINUOUS PRN
Status: DISCONTINUED | OUTPATIENT
Start: 2022-05-10 | End: 2022-05-10

## 2022-05-10 RX ORDER — HYDROMORPHONE HCL/PF 1 MG/ML
0.5 SYRINGE (ML) INJECTION
Status: DISCONTINUED | OUTPATIENT
Start: 2022-05-10 | End: 2022-05-10 | Stop reason: HOSPADM

## 2022-05-10 RX ADMIN — SODIUM CHLORIDE, SODIUM LACTATE, POTASSIUM CHLORIDE, AND CALCIUM CHLORIDE: .6; .31; .03; .02 INJECTION, SOLUTION INTRAVENOUS at 12:50

## 2022-05-10 RX ADMIN — ONDANSETRON 4 MG: 2 INJECTION INTRAMUSCULAR; INTRAVENOUS at 13:37

## 2022-05-10 RX ADMIN — KETOROLAC TROMETHAMINE 30 MG: 30 INJECTION, SOLUTION INTRAMUSCULAR at 13:37

## 2022-05-10 RX ADMIN — ROCURONIUM BROMIDE 50 MG: 50 INJECTION INTRAVENOUS at 12:50

## 2022-05-10 RX ADMIN — DEXAMETHASONE SODIUM PHOSPHATE 8 MG: 10 INJECTION, SOLUTION INTRAMUSCULAR; INTRAVENOUS at 12:57

## 2022-05-10 RX ADMIN — PROPOFOL 150 MG: 10 INJECTION, EMULSION INTRAVENOUS at 12:50

## 2022-05-10 RX ADMIN — MIDAZOLAM 2 MG: 1 INJECTION INTRAMUSCULAR; INTRAVENOUS at 12:41

## 2022-05-10 RX ADMIN — GLYCOPYRROLATE 0.6 MG: 0.2 INJECTION, SOLUTION INTRAMUSCULAR; INTRAVENOUS at 13:37

## 2022-05-10 RX ADMIN — FENTANYL CITRATE 100 MCG: 50 INJECTION INTRAMUSCULAR; INTRAVENOUS at 12:50

## 2022-05-10 RX ADMIN — NEOSTIGMINE METHYLSULFATE 4 MG: 1 INJECTION INTRAVENOUS at 13:37

## 2022-05-10 RX ADMIN — Medication 25 MCG: at 14:03

## 2022-05-10 RX ADMIN — OXYCODONE HYDROCHLORIDE 5 MG: 5 TABLET ORAL at 15:22

## 2022-05-10 RX ADMIN — Medication 25 MCG: at 14:17

## 2022-05-10 RX ADMIN — ACETAMINOPHEN 975 MG: 325 TABLET, FILM COATED ORAL at 11:48

## 2022-05-10 RX ADMIN — SODIUM CHLORIDE, SODIUM LACTATE, POTASSIUM CHLORIDE, AND CALCIUM CHLORIDE 125 ML/HR: .6; .31; .03; .02 INJECTION, SOLUTION INTRAVENOUS at 11:46

## 2022-05-10 NOTE — ANESTHESIA PREPROCEDURE EVALUATION
Procedure:  SALPINGECTOMY, LAPAROSCOPIC (Bilateral Abdomen)    [id number]  39+0wk      Relevant Problems   ANESTHESIA (within normal limits)      CARDIO   (+) Migraine   (-) HTN (hypertension)   (-) Hyperlipidemia      ENDO   (-) Diabetes mellitus, type 2 (HCC)   (-) Hyperthyroidism   (-) Hypothyroidism      GI/HEPATIC   (+) Esophageal reflux      /RENAL (within normal limits)      HEMATOLOGY (within normal limits)      MUSCULOSKELETAL (within normal limits)      NEURO/PSYCH   (+) Anxiety state   (+) H/O postpartum hemorrhage, currently pregnant, third trimester   (+) Migraine      PULMONARY   (-) Asthma   (-) Sleep apnea        Physical Exam    Airway    Mallampati score: I  TM Distance: >3 FB  Neck ROM: full     Dental   No notable dental hx     Cardiovascular      Pulmonary      Other Findings        Anesthesia Plan  ASA Score- 1     Anesthesia Type- general with ASA Monitors  Additional Monitors:   Airway Plan: ETT  Comment: Patient seen and examined  History reviewed  Patient to be done under general anesthesia with ETT and routine monitors  Risks discussed with the patient  Consent obtained          Plan Factors-Exercise tolerance (METS): >4 METS  Chart reviewed  Existing labs reviewed  Patient summary reviewed  Patient is not a current smoker  Induction- intravenous  Postoperative Plan- Plan for postoperative opioid use  Planned trial extubation    Informed Consent- Anesthetic plan and risks discussed with patient  I personally reviewed this patient with the CRNA  Discussed and agreed on the Anesthesia Plan with the CRNA  London Dunlap

## 2022-05-10 NOTE — ANESTHESIA POSTPROCEDURE EVALUATION
Post-Op Assessment Note    CV Status:  Stable  Pain Score: 0    Pain management: adequate     Mental Status:  Alert and awake   Hydration Status:  Euvolemic   PONV Controlled:  Controlled   Airway Patency:  Patent      Post Op Vitals Reviewed: Yes      Staff: CRNA, Anesthesiologist         No complications documented      BP   106/49   Temp  97 4   Pulse  67   Resp   14   SpO2   100

## 2022-05-10 NOTE — OP NOTE
OPERATIVE REPORT  PATIENT NAME: Rajat Alvarez    :  1980  MRN: [id number]  Pt Location:  OR ROOM 05    SURGERY DATE: 5/10/2022    Surgeon(s) and Role:     * Rubén Schwartz MD - Primary     * Joselin Valdez MD - Assisting    Preop Diagnosis:  Encounter for sterilization [Z30 2]    Post-Op Diagnosis Codes:     * Encounter for sterilization [Z30 2]    Procedure(s) (LRB):  SALPINGECTOMY, LAPAROSCOPIC (Bilateral)    Specimen(s):  ID Type Source Tests Collected by Time Destination   1 :  Tissue Fallopian Tubes, Bilateral TISSUE EXAM Rubén Schwartz MD 5/10/2022 1322        Estimated Blood Loss:   Minimal    Urine output: 10 cc    Anesthesia Type:   General    Operative Indications:  Encounter for sterilization [Z30 2]    Operative Findings:  Normal appearing external female genitalia  Normal appearing vaginal mucosa  Multiparous, grossly normal appearing cervix  Retroverted mobile uterus   Excellent descensus   No palpable adnexal masses or fullness    On laparoscopic evaluation:   Normal abdominal survey without evidence of injury or gross pathology   Grossly normal appearing uterus, bilateral tubes and ovaries  Mild adhesions of omentum and bowel to left lateral sidewall  Left adnexa was present inferior to the adhesions  Lysis of adhesions was not necessary in order to visualize left adnexa  Physiologic fluid in posterior vaginal vault consistent with concurrent menstruation at time of procedure  Uterine perforation of cone cannula through posterior lower uterine segment identified  Perforation was noted to be hemostatic at completion of case  No evidence of injury to bowel, bladder, vasculature or other structures  Complications:   None    Procedure and Technique:    Patient was taken to the operating room  General endotracheal anesthesia (GET) was administered and the patient was positioned on the OR table in the dorsal lithotomy position   All pressure points were padded and a shayy hugger was placed to maintain control of core body temperature  A bimanual exam was performed and the uterus was noted to be retroverted, normal in size and consistency with no palpable adnexal masses or fullness  The patient was prepped and draped in the usual sterile fashion with chloroprep on the abdomen and diluted chlorhexidine prep on the vagina and perineum  A time out was performed to confirm correct patient and correct procedure  A straight catheter was introduced into the bladder, which was drained of 10 cc of clear yellow urine  A weighted speculum was inserted into the vagina and used to visualize the anterior lip of the cervix, which was then grasped with a single toothed tenaculum  A cone uterine manipulator was inserted into the cervix and secured to the tenaculum  The speculum was removed from the vagina  Sterile gloves were then exchanged and attention was turned to the abdomen  A 5 mm incision was made at the inferior edge of the umbilicus for introduction of a 5 mm trocar  Trocar was introduced under direct visualization  Pneumoperitoneum was then established to a maximum of 15mmHg  The entire abdomen and pelvis was inspected and there was no evidence of injury to bowel, bladder, vasculature, or other structures  Two additional port sites were selected in the left and right lower quadrants  A 5mm incision was made for introduction of a 5 mm trocar under direct visualization at each site  Patient was placed in Trendelenburg and the uterus was elevated to visualize the fallopian tubes  Uterine perforation through the posterior lower uterine segment with cone cannula was identified  There was no evidence of injury to bowel, bladder or surrounding vasculature  A blunt probe was inserted through this port and used to visualize the fimbriated ends of the tubes  The left fallopian tube was grasped at its fimbriated end with a blunt grasper and elevated to visualize the mesosalpinx   The Enseal device was used to ligate along the mesosalpinx, working proximally and taking care to avoid ovarian vasculature  Approximately 2 cm from the cornua, the Enseal was used to amputate fallopian tube  This was then withdrawn from the abdominal cavity and sent for pathology  Attention was then turned to the contralateral tube, which was amputated in similar fashion  Good hemostasis was confirmed following salpingectomy  The cone manipulator was then removed under direct visualization  Perforation was noted to be hemostatic  Pneumoperitoneum was allowed to escape and perforation remained hemostatic  Pneumoperitoneum was then re-established and trocars were removed under direct visualization  Salpingectomy sites remained hemostatic  Pneumoperitoneum was again allowed to escape  Skin incisions were closed with 4-0 Monocryl  Exofin was applied  A weighted speculum was reinserted into the vagina  Single toothed tenaculum was removed from the anterior lip of the cervix  Good hemostasis was confirmed at the tenaculum puncture sites  Speculum was then removed from the vagina  At the conclusion of the procedure, all needle, sponge, and instrument counts were noted to be correct x2  Patient tolerated the procedure well and was transferred to PACU in stable condition prior to discharge with follow up in 1-2 weeks  Dr Ana Paula Galarza was present and participated in all key portions of the case      Patient Disposition:  PACU     SIGNATURE: Ene Lepe MD  DATE: May 10, 2022  TIME: 1:52 PM

## 2022-05-10 NOTE — INTERVAL H&P NOTE
H&P reviewed  After examining the patient I find no changes in the patients condition since the H&P had been written      Vitals:    05/10/22 1122   BP: 102/52   Pulse: 58   Resp: 20   Temp: (!) 96 1 °F (35 6 °C)   SpO2: 98%

## 2022-05-10 NOTE — DISCHARGE INSTRUCTIONS
Salpingectomy   WHAT YOU NEED TO KNOW:   A salpingectomy is surgery to remove one or both of your fallopian tubes  The fallopian tubes carry eggs from the ovaries to the uterus  They are part of a woman's reproductive system  A salpingectomy may be done to treat an ectopic pregnancy, cancer, endometriosis, or an infection  It may also be done to prevent pregnancy or some types of cancer  DISCHARGE INSTRUCTIONS:   Call your local emergency number (911 in the [address]) for any of the following:   · You feel lightheaded, short of breath, and have chest pain  · You cough up blood  · You have trouble breathing  Seek care immediately if:   · Your arm or leg feels warm, tender, and painful  It may look swollen and red  · Blood soaks through your bandage  · Your stitches come apart  · You soak through 1 sanitary pad in 1 hour  · You have trouble urinating or cannot urinate at all  Call your doctor or surgeon if:   · You have a fever or chills  · Your wound is red, swollen, or draining pus  · You have pus or a foul-smelling odor coming from your vagina  · Your pain does not get better after you take your medicine  · You have nausea or are vomiting  · Your skin is itchy, swollen, or you have a rash  · You have questions or concerns about your condition or care  Medicines: You may need any of the following:  · NSAIDs , such as ibuprofen, help decrease swelling, pain, and fever  NSAIDs can cause stomach bleeding or kidney problems in certain people  If you take blood thinner medicine, always ask your healthcare provider if NSAIDs are safe for you  Always read the medicine label and follow directions  · Prescription pain medicine  may be given  Ask your healthcare provider how to take this medicine safely  Some prescription pain medicines contain acetaminophen  Do not take other medicines that contain acetaminophen without talking to your healthcare provider   Too much acetaminophen may cause liver damage  Prescription pain medicine may cause constipation  Ask your healthcare provider how to prevent or treat constipation  · Take your medicine as directed  Contact your healthcare provider if you think your medicine is not helping or if you have side effects  Tell him or her if you are allergic to any medicine  Keep a list of the medicines, vitamins, and herbs you take  Include the amounts, and when and why you take them  Bring the list or the pill bottles to follow-up visits  Carry your medicine list with you in case of an emergency  Care for your wound as directed:  Ask your healthcare provider when your wound can get wet  Do not take a bath until your healthcare provider says it is okay  Take a shower only  Carefully wash around the wound with soap and water  Let the soap and water gently run over your incision  Do not  scrub your incision  Dry the area and put on new, clean bandages as directed  Change your bandages when they get wet or dirty  If you have strips of medical tape, let them fall off on their own  Activity:  Ask your healthcare provider when you can return to your normal activities  Do not douche, use tampons, or have sex until your healthcare provider says it is okay  These activities may cause infection  Do not exercise or lift anything heavy until your healthcare provider says it is okay  This may put too much stress on your incision  Follow up with your doctor or surgeon as directed:  Write down your questions so you remember to ask them during your visits  © Copyright [employer] 2022 Information is for End User's use only and may not be sold, redistributed or otherwise used for commercial purposes  All illustrations and images included in CareNotes® are the copyrighted property of A D A M , Inc  or [employer]bi Ruiz  The above information is an  only  It is not intended as medical advice for individual conditions or treatments   Talk to your doctor, nurse or pharmacist before following any medical regimen to see if it is safe and effective for you  Postoperative Opioid Use  You may have been prescribed an opioid pain medication to assist with your postoperative pain control  Our goal is for your pain to be controlled, not for you to be completely pain free  Being pain free after surgery takes time  While this medication is excellent at treating postoperative pain, it has several side effects and addictive properties  Here is a suggested plan to minimize opiate use:    Use non-opiate methods of pain control first   Use ice packs over your incision to reduce pain and swelling  This will help minimize the need for medications   For the first few days after your surgery, take 650 mg of Acetaminophen (Tylenol) every 6 hours regularly  In addition, take 600 mg of Ibuprofen (Motrin) every 6 hours regularly  Using these medications will help keep you from getting into severe pain and can reduce how much narcotic pain medication you need even if they are not enough to control your pain  Use opiate medication when the other methods are not adequate   For the first few days after surgery, you may need additional pain relief    You may take your opiate medication every 4-6 hours   This is the first medication you should stop taking as your pain improves  After the first few days, you should not require opiate medications   You may continue to take 650 mg of Acetaminophen (Tylenol) every 6 hours regularly   You may take Ibuprofen (Motrin) 600 mg as needed for additional pain relief   If you have unused tablets of the opiate medication, see below  Do not take more than 4,000 mg of Acetaminophen (Tylenol) in a 24 hour period  Please ask your doctor for guidance on amount of Acetaminophen (Tylenol) it is safe to take if you have known liver disease  If you have a sensitive stomach, please take Ibuprofen (Motrin) with food   Please ask your doctor for guidance on safety of Ibuprofen (Motrin) if you have known kidney disease or if you have a history of weight loss surgery  Side effects of opioids   Do not drive or operate heavy machinery while using opioid pain medications    A few days of postoperative opioid use is safe while breastfeeding    Opioids can cause constipation  Please be sure to drink lots of water, and your doctor may recommend use of a stool softener, such as docusate sodium (Colace) 100 mg twice a day or Senna 8 6 mg every night  Disposing of your unused pills  It is very likely that you will not need all of the pills you have been prescribed  They should not be used to treat other pain  They should not be shared with other people  They should not be saved  It is not safe to take  medications  It is not safe to keep them around the house  It is not safe to keep them within reach of children or pets  You should dispose of unused pills by taking them to a Controlled Substance Disposal Site  Controlled Substance Disposal Sites   The following locations are controlled substance disposal sites  You may walk in to any location with unused pills and dispose of them safely and anonymously  Pharmacies that accept unused pills  [facility]  [address] [place] [address] E, [address], [address], [address] [place] [address] [address] [place], [address]   [address] [place], [place]  Please call your local pharmacy to see if they also have a Controlled Substance Disposal location and are not on this list     Police departments may accept unused pills  Most local police departments have a drop box on their premises  Please contact your local police department for more information  Information can also be found here: https://safe  pharmacy/drug-disposal/ [safe pharmacy]    National Prescription Drug Take Back Day  There is a biannual National Prescription Drug Take Back Day, usually in April and October, when there are more sites available than usual, including most [facility] locations  Information for that can be found here: https://takebackday camila gov/ [takebackday camila gov]  You can find more information about prescription disposal here:   https://safe  pharmacy/drug-disposal/ [safe  pharmacy]   [employer]  aspx [ddap pa gov]    Thank you

## 2022-05-16 ENCOUNTER — TELEPHONE (OUTPATIENT)
Dept: OBGYN CLINIC | Facility: CLINIC | Age: 42
End: 2022-05-16

## 2022-05-16 NOTE — TELEPHONE ENCOUNTER
Lmom for patient needs in person, unless doesn't want to come in and doing ok   Also needs to reschedule annual 6/21

## 2022-05-16 NOTE — TELEPHONE ENCOUNTER
Pt called and said she needs to reschedule this appt, her child is sick and can't come in  She would like to do a virtual if possible or reschedule

## 2022-07-19 ENCOUNTER — ANNUAL EXAM (OUTPATIENT)
Dept: OBGYN CLINIC | Facility: CLINIC | Age: 42
End: 2022-07-19
Payer: COMMERCIAL

## 2022-07-19 VITALS
BODY MASS INDEX: 26.92 KG/M2 | HEIGHT: 68 IN | WEIGHT: 177.6 LBS | DIASTOLIC BLOOD PRESSURE: 72 MMHG | SYSTOLIC BLOOD PRESSURE: 114 MMHG

## 2022-07-19 DIAGNOSIS — Z12.31 ENCOUNTER FOR SCREENING MAMMOGRAM FOR MALIGNANT NEOPLASM OF BREAST: ICD-10-CM

## 2022-07-19 DIAGNOSIS — Z01.419 ENCOUNTER FOR GYNECOLOGICAL EXAMINATION (GENERAL) (ROUTINE) WITHOUT ABNORMAL FINDINGS: Primary | ICD-10-CM

## 2022-07-19 DIAGNOSIS — Z11.3 SCREENING FOR STD (SEXUALLY TRANSMITTED DISEASE): ICD-10-CM

## 2022-07-19 DIAGNOSIS — Z71.85 HPV VACCINE COUNSELING: ICD-10-CM

## 2022-07-19 PROBLEM — N63.20 LEFT BREAST LUMP: Status: RESOLVED | Noted: 2017-12-13 | Resolved: 2022-07-19

## 2022-07-19 PROBLEM — Z30.2 ENCOUNTER FOR STERILIZATION: Status: RESOLVED | Noted: 2020-06-15 | Resolved: 2022-07-19

## 2022-07-19 PROBLEM — O09.293 H/O POSTPARTUM HEMORRHAGE, CURRENTLY PREGNANT, THIRD TRIMESTER: Status: RESOLVED | Noted: 2022-02-25 | Resolved: 2022-07-19

## 2022-07-19 PROBLEM — Z90.79 STATUS POST BILATERAL SALPINGECTOMY: Status: RESOLVED | Noted: 2022-05-10 | Resolved: 2022-07-19

## 2022-07-19 PROCEDURE — 87491 CHLMYD TRACH DNA AMP PROBE: CPT | Performed by: PHYSICIAN ASSISTANT

## 2022-07-19 PROCEDURE — G0124 SCREEN C/V THIN LAYER BY MD: HCPCS | Performed by: PATHOLOGY

## 2022-07-19 PROCEDURE — G0145 SCR C/V CYTO,THINLAYER,RESCR: HCPCS | Performed by: PATHOLOGY

## 2022-07-19 PROCEDURE — G0476 HPV COMBO ASSAY CA SCREEN: HCPCS | Performed by: PHYSICIAN ASSISTANT

## 2022-07-19 PROCEDURE — S0612 ANNUAL GYNECOLOGICAL EXAMINA: HCPCS | Performed by: PHYSICIAN ASSISTANT

## 2022-07-19 PROCEDURE — 87591 N.GONORRHOEAE DNA AMP PROB: CPT | Performed by: PHYSICIAN ASSISTANT

## 2022-07-19 RX ORDER — DIPHENOXYLATE HYDROCHLORIDE AND ATROPINE SULFATE 2.5; .025 MG/1; MG/1
1 TABLET ORAL DAILY
COMMUNITY

## 2022-07-19 RX ORDER — NAPROXEN 500 MG/1
TABLET ORAL
COMMUNITY
Start: 2022-07-11

## 2022-07-19 NOTE — ASSESSMENT & PLAN NOTE
The current ASCCP guidelines were reviewed  Patient's last pap was 4/5/19 - WNL and therefore, a pap with HPV cotesting is indicated at this time  I emphasized the importance of an annual pelvic and breast exam  Patient ok to have a pap done today

## 2022-07-19 NOTE — PROGRESS NOTES
Assessment/Plan   Diagnoses and all orders for this visit:    Encounter for gynecological examination (general) (routine) without abnormal findings  -     Liquid-based pap, screening  The current ASCCP guidelines were reviewed  Patient's last pap was 4/5/19 - WNL and therefore, a pap with HPV cotesting is indicated at this time  I emphasized the importance of an annual pelvic and breast exam  Patient ok to have a pap done today  Screening for STD (sexually transmitted disease)  -     Chlamydia/GC amplified DNA by PCR  Encourage safe sexual practices; STI testing - C/G cultures collected; STI labs declined    Encounter for screening mammogram for malignant neoplasm of breast  -     Mammo screening bilateral w 3d & cad; Future  Breast cancer screening is not indicated at this time - routine screening mammogram recommended starting at age 36 - slip provided so she can scheduled when she turns 38 in December    HPV vaccine counseling  The patient has not had the Gardasil vaccine series, which is recommended for patients from 545 years of age  Strongly encourage - info given; pt to check with insurance for coverage and call if desires    Discussion  I have discussed the importance of monthly self-breast exams, exercise and healthy diet as well as adequate intake of calcium and vitamin D  Encourage MVI q day and r/keyla importance of folic acid; Encourage 30-40 min weight bearing exercise most days of week  Contraception - B/L salpingectomy  All questions have been answered to her satisfaction  RTO for APE or sooner if needed    Subjective     HPI   Rafaela Ortiz is a 46 y o  female who presents for annual well woman exam  S/P B/L salpingectomy on 5/10/22 - pathology benign  Menarche - 12; LMP - 6/26/22; Periods are reg q month and last 6 days; Heavy flow day 1 - last period tried menstrual cup which worked well for her; No intermenstrual bleeding or spotting; Cramps are tolerable  No vulvar itch/burn;  No vaginal itch/burn; No abn discharge or odor; No urinary sx - burning/pain/frequency/hematuria  (+) SBEs - no breast masses, asymmetry, nipple discharge or bleeding, changes in skin of breast, or breast tenderness bilaterally  No abd/pelvic pain or HAs;   Pt is not currently sexually active -  from  but did have sex with him once in the past couple months while they have been ; No issues with intercourse; She requests sti cultures today; declines hiv/hep testing; Feels safe at home  Current contraception: B/L salpingectomy;  Gardasil - not done   (+) PCP for routine Bw/care; Last Pap - 4/5/19 - WNL; 12/13/17 - WNL (-) HRHPV type 16/18 neg  History of abnormal Pap smear: yes once and WNL since  Last STI screen - 7/27/21 - C/G/T neg    Review of Systems   Constitutional: Negative for activity change, fatigue, fever and unexpected weight change  HENT: Negative for congestion, dental problem, sinus pressure and sinus pain  Eyes: Negative for visual disturbance  Respiratory: Negative for cough, shortness of breath and wheezing  Cardiovascular: Negative for chest pain and leg swelling  Gastrointestinal: Negative for abdominal distention, abdominal pain, blood in stool, constipation, diarrhea, nausea and vomiting  Endocrine: Negative for polydipsia  Genitourinary: Negative for difficulty urinating, dyspareunia, dysuria, frequency, hematuria, menstrual problem, pelvic pain, urgency, vaginal bleeding, vaginal discharge and vaginal pain  Musculoskeletal: Negative for arthralgias and back pain  Allergic/Immunologic: Negative for environmental allergies  Neurological: Negative for dizziness, seizures and headaches  Psychiatric/Behavioral: Negative for dysphoric mood and sleep disturbance  The patient is not nervous/anxious          The following portions of the patient's history were reviewed and updated as appropriate: allergies, current medications, past family history, past medical history, past social history, past surgical history and problem list          OB History        8    Para   4    Term   3       1    AB   4    Living   5       SAB   2    IAB        Ectopic        Multiple   1    Live Births   5           Obstetric Comments    CF neg;  SMA neg; Hgb electrophoresis WNL             Past Medical History:   Diagnosis Date    Abnormal Pap smear of cervix     Deviated septum     Ear problems     GERD (gastroesophageal reflux disease)     Heartburn during pregnancy     Migraine     Palpitations     Varicella        Past Surgical History:   Procedure Laterality Date    BREAST CYST ASPIRATION Left     cyst asp     DENTAL SURGERY      DILATION AND CURETTAGE OF UTERUS      NASAL SEPTUM SURGERY      NC LAP,RMV  ADNEXAL STRUCTURE Bilateral 5/10/2022    Procedure: SALPINGECTOMY, LAPAROSCOPIC;  Surgeon: Noris Stover MD;  Location:  MAIN OR;  Service: Gynecology       Family History   Problem Relation Age of Onset    Miscarriages / [place] Mother     Birth defects Sister     Drug abuse Paternal Uncle     Arthritis Maternal Grandmother     Cancer Maternal Grandmother     Hyperlipidemia Maternal Grandmother     Hypertension Maternal Grandmother     Cancer Maternal Grandfather     Hearing loss Maternal Grandfather     Vision loss Maternal Grandfather     Cancer Paternal Grandmother     Diabetes Paternal Grandmother     Hearing loss Paternal Grandmother     Hyperlipidemia Paternal Grandmother     Hypertension Paternal Grandmother     Alcohol abuse Paternal Grandfather     Cancer Paternal Grandfather     Mental illness Paternal Grandfather     Lung cancer Paternal Grandfather     Lung disease Paternal Grandfather     Colon cancer Maternal Aunt        Social History     Socioeconomic History    Marital status: /Civil Union     Spouse name: Not on file    Number of children: Not on file    Years of education: Not on file  Highest education level: Not on file   Occupational History    Not on file   Tobacco Use    Smoking status: Never Smoker    Smokeless tobacco: Never Used   Vaping Use    Vaping Use: Never used   Substance and Sexual Activity    Alcohol use: Yes     Comment: social/; rare    Drug use: No    Sexual activity: Not Currently     Partners: Male     Birth control/protection: None   Other Topics Concern    Not on file   Social History Narrative    Not on file     Social Determinants of Health     Financial Resource Strain: Not on file   Food Insecurity: Not on file   Transportation Needs: Not on file   Physical Activity: Not on file   Stress: Not on file   Social Connections: Not on file   Intimate Partner Violence: Not on file   Housing Stability: Not on file         Current Outpatient Medications:     multivitamin (THERAGRAN) TABS, Take 1 tablet by mouth daily, Disp: , Rfl:     naproxen (NAPROSYN) 500 mg tablet, TAKE 1 TABLET BY MOUTH TWICE A DAY FOR 14 DAYS, Disp: , Rfl:     No Known Allergies    Objective   Vitals:    07/19/22 1115   BP: 114/72   Weight: 80 6 kg (177 lb 9 6 oz)   Height: 5' 8" (1 727 m)     Physical Exam  Vitals reviewed  Constitutional:       General: She is awake  She is not in acute distress  Appearance: Normal appearance  She is well-developed and well-groomed  She is not ill-appearing, toxic-appearing or diaphoretic  HENT:      Head: Normocephalic and atraumatic  Eyes:      Conjunctiva/sclera: Conjunctivae normal    Neck:      Thyroid: No thyroid mass, thyromegaly or thyroid tenderness  Cardiovascular:      Rate and Rhythm: Normal rate and regular rhythm  Heart sounds: Normal heart sounds  No murmur heard  Pulmonary:      Effort: Pulmonary effort is normal  No tachypnea, bradypnea or respiratory distress  Breath sounds: Normal breath sounds  No stridor or decreased air movement  No wheezing     Chest:   Breasts: Breasts are symmetrical       Right: Normal  No swelling, bleeding, inverted nipple, mass, nipple discharge, skin change, tenderness, axillary adenopathy or supraclavicular adenopathy  Left: Normal  No swelling, bleeding, inverted nipple, mass, nipple discharge, skin change, tenderness, axillary adenopathy or supraclavicular adenopathy  Abdominal:      General: There is no distension  Palpations: Abdomen is soft  There is no hepatomegaly, splenomegaly or mass  Tenderness: There is no abdominal tenderness  Hernia: No hernia is present  There is no hernia in the left inguinal area or right inguinal area  Genitourinary:     General: Normal vulva  Exam position: Supine  Pubic Area: No rash or pubic lice  Labia:         Right: No rash, tenderness, lesion or injury  Left: No rash, tenderness, lesion or injury  Urethra: No prolapse, urethral pain, urethral swelling or urethral lesion  Vagina: Normal  No signs of injury and foreign body  No vaginal discharge, erythema, tenderness, bleeding, lesions or prolapsed vaginal walls  Cervix: No cervical motion tenderness, discharge, friability, lesion, erythema or cervical bleeding  Uterus: Not deviated, not enlarged, not fixed, not tender and no uterine prolapse  Adnexa:         Right: No mass, tenderness or fullness  Left: No mass, tenderness or fullness  Lymphadenopathy:      Cervical: No cervical adenopathy  Upper Body:      Right upper body: No supraclavicular or axillary adenopathy  Left upper body: No supraclavicular or axillary adenopathy  Lower Body: No right inguinal adenopathy  No left inguinal adenopathy  Skin:     General: Skin is warm and dry  Neurological:      Mental Status: She is alert and oriented to person, place, and time  Psychiatric:         Mood and Affect: Mood and affect normal          Speech: Speech normal          Behavior: Behavior normal  Behavior is cooperative           Thought Content: Thought content normal          Judgment: Judgment normal

## 2022-07-20 LAB
C TRACH DNA SPEC QL NAA+PROBE: NEGATIVE
N GONORRHOEA DNA SPEC QL NAA+PROBE: NEGATIVE

## 2022-07-21 LAB
HPV HR 12 DNA CVX QL NAA+PROBE: NEGATIVE
HPV16 DNA CVX QL NAA+PROBE: NEGATIVE
HPV18 DNA CVX QL NAA+PROBE: NEGATIVE

## 2022-07-26 LAB
LAB AP GYN PRIMARY INTERPRETATION: ABNORMAL
LAB AP LMP: ABNORMAL
Lab: ABNORMAL
PATH INTERP SPEC-IMP: ABNORMAL

## 2022-12-31 ENCOUNTER — HOSPITAL ENCOUNTER (EMERGENCY)
Facility: HOSPITAL | Age: 42
Discharge: HOME | End: 2022-12-31
Attending: EMERGENCY MEDICINE

## 2022-12-31 ENCOUNTER — APPOINTMENT (EMERGENCY)
Dept: CT IMAGING | Facility: HOSPITAL | Age: 42
End: 2022-12-31

## 2022-12-31 VITALS
BODY MASS INDEX: 26.76 KG/M2 | WEIGHT: 176.59 LBS | DIASTOLIC BLOOD PRESSURE: 58 MMHG | HEIGHT: 68 IN | HEART RATE: 56 BPM | SYSTOLIC BLOOD PRESSURE: 111 MMHG | TEMPERATURE: 97.8 F | OXYGEN SATURATION: 98 % | RESPIRATION RATE: 18 BRPM

## 2022-12-31 DIAGNOSIS — R07.9 CHEST PAIN: ICD-10-CM

## 2022-12-31 DIAGNOSIS — K21.9 GERD (GASTROESOPHAGEAL REFLUX DISEASE): Primary | ICD-10-CM

## 2022-12-31 LAB
2HR DELTA HS TROPONIN: >0 NG/L
ALBUMIN SERPL BCP-MCNC: 4.2 G/DL (ref 3.5–5)
ALP SERPL-CCNC: 70 U/L (ref 34–104)
ALT SERPL W P-5'-P-CCNC: 9 U/L (ref 7–52)
ANION GAP SERPL CALCULATED.3IONS-SCNC: 11 MMOL/L (ref 4–13)
AST SERPL W P-5'-P-CCNC: 16 U/L (ref 13–39)
BACTERIA UR QL AUTO: ABNORMAL /HPF
BASOPHILS # BLD AUTO: 0.02 THOUSANDS/ÂΜL (ref 0–0.1)
BASOPHILS NFR BLD AUTO: 0 % (ref 0–1)
BILIRUB SERPL-MCNC: 0.56 MG/DL (ref 0.2–1)
BILIRUB UR QL STRIP: NEGATIVE
BUN SERPL-MCNC: 18 MG/DL (ref 5–25)
CALCIUM SERPL-MCNC: 9.4 MG/DL (ref 8.4–10.2)
CARDIAC TROPONIN I PNL SERPL HS: 2 NG/L
CARDIAC TROPONIN I PNL SERPL HS: <2 NG/L
CHLORIDE SERPL-SCNC: 106 MMOL/L (ref 96–108)
CLARITY UR: ABNORMAL
CO2 SERPL-SCNC: 20 MMOL/L (ref 21–32)
COLOR UR: ABNORMAL
CREAT SERPL-MCNC: 0.73 MG/DL (ref 0.6–1.3)
EOSINOPHIL # BLD AUTO: 0.11 THOUSAND/ÂΜL (ref 0–0.61)
EOSINOPHIL NFR BLD AUTO: 1 % (ref 0–6)
ERYTHROCYTE [DISTWIDTH] IN BLOOD BY AUTOMATED COUNT: 11.7 % (ref 11.6–15.1)
GFR SERPL CREATININE-BSD FRML MDRD: 103 ML/MIN/1.73SQ M
GLUCOSE SERPL-MCNC: 106 MG/DL (ref 65–140)
GLUCOSE UR STRIP-MCNC: NEGATIVE MG/DL
HCT VFR BLD AUTO: 35.1 % (ref 34.8–46.1)
HGB BLD-MCNC: 12 G/DL (ref 11.5–15.4)
HGB UR QL STRIP.AUTO: NEGATIVE
IMM GRANULOCYTES # BLD AUTO: 0.04 THOUSAND/UL (ref 0–0.2)
IMM GRANULOCYTES NFR BLD AUTO: 1 % (ref 0–2)
KETONES UR STRIP-MCNC: NEGATIVE MG/DL
LEUKOCYTE ESTERASE UR QL STRIP: ABNORMAL
LIPASE SERPL-CCNC: 54 U/L (ref 11–82)
LYMPHOCYTES # BLD AUTO: 3.58 THOUSANDS/ÂΜL (ref 0.6–4.47)
LYMPHOCYTES NFR BLD AUTO: 42 % (ref 14–44)
MCH RBC QN AUTO: 31.1 PG (ref 26.8–34.3)
MCHC RBC AUTO-ENTMCNC: 34.2 G/DL (ref 31.4–37.4)
MCV RBC AUTO: 91 FL (ref 82–98)
MONOCYTES # BLD AUTO: 0.63 THOUSAND/ÂΜL (ref 0.17–1.22)
MONOCYTES NFR BLD AUTO: 7 % (ref 4–12)
NEUTROPHILS # BLD AUTO: 4.23 THOUSANDS/ÂΜL (ref 1.85–7.62)
NEUTS SEG NFR BLD AUTO: 49 % (ref 43–75)
NITRITE UR QL STRIP: NEGATIVE
NON-SQ EPI CELLS URNS QL MICRO: ABNORMAL /HPF
NRBC BLD AUTO-RTO: 0 /100 WBCS
PH UR STRIP.AUTO: 7.5 [PH]
PLATELET # BLD AUTO: 234 THOUSANDS/UL (ref 149–390)
PMV BLD AUTO: 9.7 FL (ref 8.9–12.7)
POTASSIUM SERPL-SCNC: 3.4 MMOL/L (ref 3.5–5.3)
PROT SERPL-MCNC: 6.8 G/DL (ref 6.4–8.4)
PROT UR STRIP-MCNC: NEGATIVE MG/DL
RBC # BLD AUTO: 3.86 MILLION/UL (ref 3.81–5.12)
RBC #/AREA URNS AUTO: ABNORMAL /HPF
SODIUM SERPL-SCNC: 137 MMOL/L (ref 135–147)
SP GR UR STRIP.AUTO: 1.01 (ref 1–1.03)
UROBILINOGEN UR STRIP-ACNC: <2 MG/DL
WBC # BLD AUTO: 8.61 THOUSAND/UL (ref 4.31–10.16)
WBC #/AREA URNS AUTO: ABNORMAL /HPF

## 2022-12-31 RX ORDER — MAGNESIUM HYDROXIDE/ALUMINUM HYDROXICE/SIMETHICONE 120; 1200; 1200 MG/30ML; MG/30ML; MG/30ML
30 SUSPENSION ORAL ONCE
Status: COMPLETED | OUTPATIENT
Start: 2022-12-31 | End: 2022-12-31

## 2022-12-31 RX ORDER — KETOROLAC TROMETHAMINE 30 MG/ML
1 INJECTION, SOLUTION INTRAMUSCULAR; INTRAVENOUS ONCE
Status: COMPLETED | OUTPATIENT
Start: 2022-12-31 | End: 2022-12-31

## 2022-12-31 RX ORDER — LIDOCAINE HYDROCHLORIDE 20 MG/ML
15 SOLUTION OROPHARYNGEAL ONCE
Status: COMPLETED | OUTPATIENT
Start: 2022-12-31 | End: 2022-12-31

## 2022-12-31 RX ORDER — ONDANSETRON 2 MG/ML
1 INJECTION INTRAMUSCULAR; INTRAVENOUS ONCE
Status: COMPLETED | OUTPATIENT
Start: 2022-12-31 | End: 2022-12-31

## 2022-12-31 RX ORDER — SUCRALFATE 1 G/1
1 TABLET ORAL ONCE
Status: DISCONTINUED | OUTPATIENT
Start: 2022-12-31 | End: 2022-12-31 | Stop reason: HOSPADM

## 2022-12-31 RX ORDER — PANTOPRAZOLE SODIUM 20 MG/1
20 TABLET, DELAYED RELEASE ORAL DAILY
Qty: 20 TABLET | Refills: 0 | Status: SHIPPED | OUTPATIENT
Start: 2022-12-31

## 2022-12-31 RX ADMIN — IOHEXOL 100 ML: 350 INJECTION, SOLUTION INTRAVENOUS at 08:29

## 2022-12-31 RX ADMIN — ALUMINUM HYDROXIDE, MAGNESIUM HYDROXIDE, AND DIMETHICONE 30 ML: 200; 20; 200 SUSPENSION ORAL at 07:18

## 2022-12-31 RX ADMIN — LIDOCAINE HYDROCHLORIDE 15 ML: 20 SOLUTION ORAL; TOPICAL at 07:18

## 2022-12-31 NOTE — DISCHARGE INSTRUCTIONS
- return to the emergency department if you develop severe chest pain that is associated with sweating, nausea, vomiting, lightheadedness, pain that radiates to any other part of your body, or is made worse with exertion  - Begin taking Protonix 20 mg daily until you can be evaluated by gastroenterology or your primary care provider  - Avoid ibuprofen, Motrin, Advil use to help avoid worsening of GERD symptoms  - Read the attached information about GERD

## 2022-12-31 NOTE — ED ATTENDING ATTESTATION
12/31/2022  IJai MD, saw and evaluated the patient  I have discussed the patient with the resident/non-physician practitioner and agree with the resident's/non-physician practitioner's findings, Plan of Care, and MDM as documented in the resident's/non-physician practitioner's note, except where noted  All available labs and Radiology studies were reviewed  I was present for key portions of any procedure(s) performed by the resident/non-physician practitioner and I was immediately available to provide assistance  At this point I agree with the current assessment done in the Emergency Department  I have conducted an independent evaluation of this patient a history and physical is as follows:    74-year-old female healthy at baseline presents to the emergency department after experiencing back and chest discomfort  She notes that this is the third time she is experienced a bandlike sensation of discomfort across the mid back in the last month  This started again around 1 AM   This is the first that pain came through anteriorly limiting deep breaths  She did have some nausea and relates that over the last couple of days she has had diarrhea which she attributed to a GI bug  Children in her home have similar abdominal discomfort  She did receive ondansetron and ketorolac in route and reports feeling much improved  She does not currently have pain when still  No fevers, nasal congestion, cough  She did eat most recently around 8 PM and feels well following that  She did experience COVID infection a couple of weeks ago that did not have residual symptoms  On exam she appears well at rest   Mucous membranes are moist   There is no pallor nor icterus  Heart sounds regular  Lungs clear to auscultation bilaterally  No CVA tenderness  No midline thoracic or lumbar tenderness  There is no chest wall tenderness  Abdomen is soft with mild diffuse tenderness  No guarding  She does appear uncomfortable with position changes and notes that discomfort earlier was maximal in the suprapubic region      Differential diagnosis includes but is not limited to biliary colic, cholecystitis, pancreatitis, GERD, musculoskeletal versus less likely dissection, ACS, pleurisy, PE     ED Course         Critical Care Time  Procedures

## 2022-12-31 NOTE — ED PROVIDER NOTES
History  Chief Complaint   Patient presents with   • Chest Pain     Arrived via EMS  Started at 1am with lower to middle back pain that burns, also chest pain  Patient states that she had this last month but now is worse  Was given toradol and zofran by EMS  Patient is a 61-year-old female with no significant PMH that presents to the emergency department with midthoracic back pain that she describes as burning and radiating to her chest   She reports that the pain is ripping in nature but also burning  She states that this pain awoke her from sleep roughly 1 hour prior to arrival and was severe  She has had similar pain twice in the last month, however pain was mild at those times and resolved allowing her to fall back to sleep  She reports that this pain was so severe she called EMS  She denies any vomiting but reports nausea and states that she has had diarrhea for the last 2 days  She reports having COVID 2 weeks ago, but states that she has had this chest pain prior to juan c COVID  She reports that her pain is worse when she lies flat on her back and slightly improved when she lays on her left side  EMS gave her Zofran and Toradol on the way in and she reports her pain has improved slightly  She also states that when she had this pain at home earlier this morning she was sweaty  She denies any recent fevers or trauma  She reports eating fatty foods last night but denies any alcohol use yesterday  Prior to Admission Medications   Prescriptions Last Dose Informant Patient Reported?  Taking?   multivitamin (THERAGRAN) TABS   Yes No   Sig: Take 1 tablet by mouth daily   naproxen (NAPROSYN) 500 mg tablet   Yes No   Sig: TAKE 1 TABLET BY MOUTH TWICE A DAY FOR 14 DAYS      Facility-Administered Medications: None       Past Medical History:   Diagnosis Date   • Abnormal Pap smear of cervix    • Deviated septum    • Ear problems    • GERD (gastroesophageal reflux disease)    • Heartburn during pregnancy    • Migraine    • Palpitations    • Varicella        Past Surgical History:   Procedure Laterality Date   • BREAST CYST ASPIRATION Left 2014    cyst asp    • DENTAL SURGERY     • DILATION AND CURETTAGE OF UTERUS     • NASAL SEPTUM SURGERY     • NM LAPAROSCOPY W/RMVL ADNEXAL STRUCTURES Bilateral 5/10/2022    Procedure: SALPINGECTOMY, LAPAROSCOPIC;  Surgeon: Beverly Jacobson MD;  Location: [facility];  Service: Gynecology       Family History   Problem Relation Age of Onset   • Miscarriages / [place] Mother    • Birth defects Sister    • Drug abuse Paternal Uncle    • Arthritis Maternal Grandmother    • Cancer Maternal Grandmother    • Hyperlipidemia Maternal Grandmother    • Hypertension Maternal Grandmother    • Cancer Maternal Grandfather    • Hearing loss Maternal Grandfather    • Vision loss Maternal Grandfather    • Cancer Paternal Grandmother    • Diabetes Paternal Grandmother    • Hearing loss Paternal Grandmother    • Hyperlipidemia Paternal Grandmother    • Hypertension Paternal Grandmother    • Alcohol abuse Paternal Grandfather    • Cancer Paternal Grandfather    • Mental illness Paternal Grandfather    • Lung cancer Paternal Grandfather    • Lung disease Paternal Grandfather    • Colon cancer Maternal Aunt      I have reviewed and agree with the history as documented  E-Cigarette/Vaping   • E-Cigarette Use Never User      E-Cigarette/Vaping Substances   • Nicotine No    • THC No    • CBD No    • Flavoring No    • Other No    • Unknown No      Social History     Tobacco Use   • Smoking status: Never   • Smokeless tobacco: Never   Vaping Use   • Vaping Use: Never used   Substance Use Topics   • Alcohol use: Yes     Comment: social/; rare   • Drug use: No        Review of Systems   Constitutional: Negative for chills and fever  HENT: Negative for congestion, ear pain and sore throat  Eyes: Negative for pain and visual disturbance     Respiratory: Negative for cough and shortness of breath  Cardiovascular: Positive for chest pain (severe)  Negative for palpitations  Gastrointestinal: Positive for abdominal pain (epigastric), diarrhea and nausea  Negative for constipation and vomiting  Genitourinary: Negative for dysuria and hematuria  Musculoskeletal: Positive for back pain  Negative for arthralgias  Skin: Negative for color change and rash  Neurological: Negative for dizziness, seizures, syncope, light-headedness and headaches  All other systems reviewed and are negative  Physical Exam  ED Triage Vitals [12/31/22 0626]   Temperature Pulse Respirations Blood Pressure SpO2   97 8 °F (36 6 °C) 68 20 118/56 100 %      Temp Source Heart Rate Source Patient Position - Orthostatic VS BP Location FiO2 (%)   Oral Monitor Lying Left arm --      Pain Score       10 - Worst Possible Pain             Orthostatic Vital Signs  Vitals:    12/31/22 0630 12/31/22 0700 12/31/22 0730 12/31/22 0845   BP: (!) 101/49 108/51 130/75 111/58   Pulse:  62 60 56   Patient Position - Orthostatic VS:  Sitting Sitting Lying       Physical Exam  Vitals and nursing note reviewed  Constitutional:       General: She is in acute distress (moderate due to pain)  Appearance: Normal appearance  She is well-developed  She is not ill-appearing  HENT:      Head: Normocephalic and atraumatic  Right Ear: External ear normal       Left Ear: External ear normal       Mouth/Throat:      Pharynx: Oropharynx is clear  No oropharyngeal exudate or posterior oropharyngeal erythema  Eyes:      General:         Right eye: No discharge  Left eye: No discharge  Extraocular Movements: Extraocular movements intact  Conjunctiva/sclera: Conjunctivae normal    Cardiovascular:      Rate and Rhythm: Normal rate and regular rhythm  Pulses: Normal pulses  Radial pulses are 2+ on the right side and 2+ on the left side          Dorsalis pedis pulses are 2+ on the right side and 2+ on the left side       Heart sounds: Normal heart sounds  No murmur heard  Pulmonary:      Effort: Pulmonary effort is normal  No respiratory distress  Breath sounds: Normal breath sounds  No decreased breath sounds, wheezing, rhonchi or rales  Abdominal:      General: Abdomen is flat  Palpations: Abdomen is soft  Tenderness: There is abdominal tenderness (generalized, moderate)  There is no guarding or rebound  Musculoskeletal:         General: No swelling or deformity  Normal range of motion  Cervical back: Normal range of motion and neck supple  No tenderness  Right lower leg: No tenderness  No edema  Left lower leg: No tenderness  No edema  Skin:     General: Skin is warm and dry  Capillary Refill: Capillary refill takes less than 2 seconds  Findings: No rash  Neurological:      Mental Status: She is alert           ED Medications  Medications   sucralfate (CARAFATE) tablet 1 g (1 g Oral Not Given 12/31/22 0949)   ketorolac (FOR EMS ONLY) (TORADOL) injection 30 mg (0 mg Does not apply Given to EMS 12/31/22 0630)   ondansetron (FOR EMS ONLY) (ZOFRAN) 4 mg/2 mL injection 4 mg (0 mg Does not apply Given to EMS 12/31/22 0630)   aluminum-magnesium hydroxide-simethicone (MYLANTA) oral suspension 30 mL (30 mL Oral Given 12/31/22 0718)   Lidocaine Viscous HCl (XYLOCAINE) 2 % mucosal solution 15 mL (15 mL Swish & Swallow Given 12/31/22 0718)   iohexol (OMNIPAQUE) 350 MG/ML injection (SINGLE-DOSE) 100 mL (100 mL Intravenous Given 12/31/22 0829)       Diagnostic Studies  Results Reviewed     Procedure Component Value Units Date/Time    HS Troponin I 2hr [[id number]]  (Normal) Collected: 12/31/22 0845    Lab Status: Final result Specimen: Blood from Arm, Right Updated: 12/31/22 0943     hs TnI 2hr 2 ng/L      Delta 2hr hsTnI >0 ng/L     Urine Microscopic [[id number]]  (Abnormal) Collected: 12/31/22 0815    Lab Status: Final result Specimen: Urine, Clean Catch Updated: 12/31/22 0833     RBC, UA 1-2 /hpf      WBC, UA 4-10 /hpf      Epithelial Cells Occasional /hpf      Bacteria, UA Occasional /hpf     UA w Reflex to Microscopic w Reflex to Culture [[id number]]  (Abnormal) Collected: 12/31/22 0815    Lab Status: Final result Specimen: Urine, Clean Catch Updated: 12/31/22 0822     Color, UA Light Yellow     Clarity, UA Turbid     Specific Kirvin, UA 1 012     pH, UA 7 5     Leukocytes, UA Large     Nitrite, UA Negative     Protein, UA Negative mg/dl      Glucose, UA Negative mg/dl      Ketones, UA Negative mg/dl      Urobilinogen, UA <2 0 mg/dl      Bilirubin, UA Negative     Occult Blood, UA Negative    HS Troponin I 4hr [[id number]]     Lab Status: No result Specimen: Blood     Lipase [[id number]]  (Normal) Collected: 12/31/22 0718    Lab Status: Final result Specimen: Blood from Arm, Right Updated: 12/31/22 0801     Lipase 54 u/L     Comprehensive metabolic panel [[id number]]  (Abnormal) Collected: 12/31/22 0639    Lab Status: Final result Specimen: Blood from Arm, Left Updated: 12/31/22 0749     Sodium 137 mmol/L      Potassium 3 4 mmol/L      Chloride 106 mmol/L      CO2 20 mmol/L      ANION GAP 11 mmol/L      BUN 18 mg/dL      Creatinine 0 73 mg/dL      Glucose 106 mg/dL      Calcium 9 4 mg/dL      AST 16 U/L      ALT 9 U/L      Alkaline Phosphatase 70 U/L      Total Protein 6 8 g/dL      Albumin 4 2 g/dL      Total Bilirubin 0 56 mg/dL      eGFR 103 ml/min/1 73sq m     Narrative:      [place] guidelines for Chronic Kidney Disease (CKD):   •  Stage 1 with normal or high GFR (GFR > 90 mL/min/1 73 square meters)  •  Stage 2 Mild CKD (GFR = 60-89 mL/min/1 73 square meters)  •  Stage 3A Moderate CKD (GFR = 45-59 mL/min/1 73 square meters)  •  Stage 3B Moderate CKD (GFR = 30-44 mL/min/1 73 square meters)  •  Stage 4 Severe CKD (GFR = 15-29 mL/min/1 73 square meters)  •  Stage 5 End Stage CKD (GFR <15 mL/min/1 73 square meters)  Note: GFR calculation is accurate only with a steady state creatinine    HS Troponin 0hr (reflex protocol) [[id number]]  (Normal) Collected: 12/31/22 0639    Lab Status: Final result Specimen: Blood from Arm, Left Updated: 12/31/22 0748     hs TnI 0hr <2 ng/L     CBC and differential [[id number]] Collected: 12/31/22 0639    Lab Status: Final result Specimen: Blood from Arm, Left Updated: 12/31/22 0708     WBC 8 61 Thousand/uL      RBC 3 86 Million/uL      Hemoglobin 12 0 g/dL      Hematocrit 35 1 %      MCV 91 fL      MCH 31 1 pg      MCHC 34 2 g/dL      RDW 11 7 %      MPV 9 7 fL      Platelets 394 Thousands/uL      nRBC 0 /100 WBCs      Neutrophils Relative 49 %      Immat GRANS % 1 %      Lymphocytes Relative 42 %      Monocytes Relative 7 %      Eosinophils Relative 1 %      Basophils Relative 0 %      Neutrophils Absolute 4 23 Thousands/µL      Immature Grans Absolute 0 04 Thousand/uL      Lymphocytes Absolute 3 58 Thousands/µL      Monocytes Absolute 0 63 Thousand/µL      Eosinophils Absolute 0 11 Thousand/µL      Basophils Absolute 0 02 Thousands/µL                  CTA dissection protocol chest abdomen pelvis w wo contrast   Final Result by Benjamin Yoder MD (12/31 4132)      No aortic dissection  No acute findings in the chest, abdomen or pelvis  Nonemergent findings above  Workstation performed: [id number]               Procedures  ECG 12 Lead Documentation Only    Date/Time: 12/31/2022 7:15 AM  Performed by: William Salter DO  Authorized by:  William Salter DO     Indications / Diagnosis:  Chest pain   ECG reviewed by me, the ED Provider: yes    Patient location:  ED  Previous ECG:     Previous ECG:  Unavailable  Interpretation:     Interpretation: normal    Rate:     ECG rate:  70    ECG rate assessment: normal    Rhythm:     Rhythm: sinus rhythm    Ectopy:     Ectopy: none    QRS:     QRS axis:  Normal    QRS intervals:  Normal  Conduction:     Conduction: normal    ST segments:     ST segments:  Normal  T waves:     T waves: inverted      Inverted:  V2          ED Course             HEART Risk Score    Flowsheet Row Most Recent Value   Heart Score Risk Calculator    History 1 Filed at: 12/31/2022 0931   ECG 0 Filed at: 12/31/2022 0931   Age 0 Filed at: 12/31/2022 0931   Risk Factors 0 Filed at: 12/31/2022 0931   Troponin 0 Filed at: 12/31/2022 0931   HEART Score 1 Filed at: 12/31/2022 0931              PERC Rule for PE    Flowsheet Row Most Recent Value   PERC Rule for PE    Age >=50 0 Filed at: 12/31/2022 0935   HR >=100 0 Filed at: 12/31/2022 0935   O2 Sat on room air < 95% 0 Filed at: 12/31/2022 0935   History of PE or DVT 0 Filed at: 12/31/2022 5697   Recent trauma or surgery 0 Filed at: 12/31/2022 0935   Hemoptysis 0 Filed at: 12/31/2022 0935   Exogenous estrogen 0 Filed at: 12/31/2022 0935   Unilateral leg swelling 0 Filed at: 12/31/2022 0935   PERC Rule for PE Results 0 Filed at: 12/31/2022 0935              SBIRT 20yo+    Flowsheet Row Most Recent Value   SBIRT (23 yo +)    In order to provide better care to our patients, we are screening all of our patients for alcohol and drug use  Would it be okay to ask you these screening questions? Yes Filed at: 12/31/2022 4708   Initial Alcohol Screen: US AUDIT-C     1  How often do you have a drink containing alcohol? 0 Filed at: 12/31/2022 0633   2  How many drinks containing alcohol do you have on a typical day you are drinking? 0 Filed at: 12/31/2022 0633   3b  FEMALE Any Age, or MALE 65+: How often do you have 4 or more drinks on one occassion? 0 Filed at: 12/31/2022 7955   Audit-C Score 0 Filed at: 12/31/2022 2062   MARVIN: How many times in the past year have you    Used an illegal drug or used a prescription medication for non-medical reasons?  Never Filed at: 12/31/2022 8678          Wells' Criteria for PE    Flowsheet Row Most Recent Value   Ebenezer' Criteria for PE    Clinical signs and symptoms of DVT 0 Filed at: 12/31/2022 2636   PE is primary diagnosis or equally likely 0 Filed at: 12/31/2022 0935   HR >100 0 Filed at: 12/31/2022 0935   Immobilization at least 3 days or Surgery in the previous 4 weeks 0 Filed at: 12/31/2022 0935   Previous, objectively diagnosed PE or DVT 0 Filed at: 12/31/2022 0935   Hemoptysis 0 Filed at: 12/31/2022 1427   Malignancy with treatment within 6 months or palliative 0 Filed at: 12/31/2022 1218   Wells' Criteria Total 0 Filed at: 12/31/2022 8028            MDM  Number of Diagnoses or Management Options  Chest pain  GERD (gastroesophageal reflux disease)  Diagnosis management comments: 42F with no significant PMH presents to the emergency department via EMS in moderate distress due to back pain radiating to the chest that is burning in nature  She describes the pain as a tearing sensation  The patient has moderate generalized tenderness to palpation the abdomen, worse in the epigastric region  Exam is otherwise within normal limits  Work-up in the emergency department is within normal limits  Normal sinus rhythm  No evidence of ischemia or dysrhythmia on EKG  CTA dissection protocol of the chest abdomen pelvis with and without contrast reveals no acute findings  After administration of viscous lidocaine and Mylanta the patient's pain resolves  In the absence of concerning findings on laboratory exam, EKG, CT low suspicion for aortic dissection, cardiac source of pain, or other structural or infectious etiology  With resolution of pain with GI cocktail the patient is likely experiencing GERD, especially considering she reported her pain as a burning sensation and reports eating many fatty foods last night while watching a football game  Gave specific abdominal pain discharge instructions to the patient  No obvious cause of patients symptoms found on workup  While this most likely means that there is no concerning pathology, there is still the chance that we could be missing something in the CT and blood work   Encouraged patient to followup with primary care doctor and [facility] and return if symptoms worsen or new symptoms develop  Discussed with the patient who agrees with the workup and plan, understands return precautions and followup instructions  Return precautions are discussed with the patient and they demonstrate understanding of the plan  The patient's questions are all answered to the their satisfaction and the patient is discharged home  Disposition  Final diagnoses:   GERD (gastroesophageal reflux disease)   Chest pain     Time reflects when diagnosis was documented in both MDM as applicable and the Disposition within this note     Time User Action Codes Description Comment    12/31/2022  9:31 AM Marjorie Singh Add [K21 9] GERD (gastroesophageal reflux disease)     12/31/2022  9:31 AM Marjorie Singh Add [R07 9] Chest pain       ED Disposition     ED Disposition   Discharge    Condition   Stable    Date/Time   Sat Dec 31, 2022  9:30 AM    Comment   Marlene Torres discharge to home/self care                 Follow-up Information     Follow up With Specialties Details Why Contact Info Additional [facility] [place] [phone] Gastroenterology Call  To schedule an appointment for further evaluation and treatment of GERD symptoms [address] [facility] [address] [phone]  [facility] Ankush Perez [facility] [place] [address], [address] [facility], [address], [address]          Discharge Medication List as of 12/31/2022  9:39 AM      START taking these medications    Details   pantoprazole (PROTONIX) 20 mg tablet Take 1 tablet (20 mg total) by mouth daily, Starting Sat 12/31/2022, Normal         CONTINUE these medications which have NOT CHANGED    Details   multivitamin (THERAGRAN) TABS Take 1 tablet by mouth daily, Historical Med      naproxen (NAPROSYN) 500 mg tablet TAKE 1 TABLET BY MOUTH TWICE A DAY FOR 14 DAYS, Historical Med No discharge procedures on file  PDMP Review     None           ED Provider  Attending physically available and evaluated Gatito Domínguez I managed the patient along with the ED Attending      Electronically Signed by         Tiffany Zuniga DO  12/31/22 1045

## 2022-12-31 NOTE — ED NOTES
Patient ambulated to bathroom with parent, denies any pain or dizziness     [facility] Staff, [facility]  12/31/22 7297

## 2023-01-01 ENCOUNTER — APPOINTMENT (EMERGENCY)
Dept: RADIOLOGY | Facility: HOSPITAL | Age: 43
End: 2023-01-01

## 2023-01-01 ENCOUNTER — APPOINTMENT (EMERGENCY)
Dept: ULTRASOUND IMAGING | Facility: HOSPITAL | Age: 43
End: 2023-01-01

## 2023-01-01 ENCOUNTER — HOSPITAL ENCOUNTER (INPATIENT)
Facility: HOSPITAL | Age: 43
LOS: 3 days | Discharge: HOME | End: 2023-01-04
Attending: EMERGENCY MEDICINE | Admitting: SURGERY

## 2023-01-01 DIAGNOSIS — K80.21 CALCULUS OF GALLBLADDER WITH BILIARY OBSTRUCTION BUT WITHOUT CHOLECYSTITIS: ICD-10-CM

## 2023-01-01 DIAGNOSIS — Z90.49 S/P LAPAROSCOPIC CHOLECYSTECTOMY: ICD-10-CM

## 2023-01-01 DIAGNOSIS — K80.50 CHOLEDOCHOLITHIASIS: Primary | ICD-10-CM

## 2023-01-01 LAB
ALBUMIN SERPL BCP-MCNC: 4.3 G/DL (ref 3.5–5)
ALP SERPL-CCNC: 199 U/L (ref 34–104)
ALT SERPL W P-5'-P-CCNC: 473 U/L (ref 7–52)
ANION GAP SERPL CALCULATED.3IONS-SCNC: 8 MMOL/L (ref 4–13)
AST SERPL W P-5'-P-CCNC: 409 U/L (ref 13–39)
BASOPHILS # BLD AUTO: 0.02 THOUSANDS/ÂΜL (ref 0–0.1)
BASOPHILS NFR BLD AUTO: 0 % (ref 0–1)
BILIRUB SERPL-MCNC: 3.64 MG/DL (ref 0.2–1)
BUN SERPL-MCNC: 15 MG/DL (ref 5–25)
CALCIUM SERPL-MCNC: 9.6 MG/DL (ref 8.4–10.2)
CARDIAC TROPONIN I PNL SERPL HS: 2 NG/L (ref 8–18)
CHLORIDE SERPL-SCNC: 104 MMOL/L (ref 96–108)
CO2 SERPL-SCNC: 26 MMOL/L (ref 21–32)
CREAT SERPL-MCNC: 0.73 MG/DL (ref 0.6–1.3)
EOSINOPHIL # BLD AUTO: 0.09 THOUSAND/ÂΜL (ref 0–0.61)
EOSINOPHIL NFR BLD AUTO: 1 % (ref 0–6)
ERYTHROCYTE [DISTWIDTH] IN BLOOD BY AUTOMATED COUNT: 11.8 % (ref 11.6–15.1)
GFR SERPL CREATININE-BSD FRML MDRD: 103 ML/MIN/1.73SQ M
GLUCOSE SERPL-MCNC: 100 MG/DL (ref 65–140)
HCG SERPL QL: NEGATIVE
HCT VFR BLD AUTO: 35.9 % (ref 34.8–46.1)
HGB BLD-MCNC: 11.9 G/DL (ref 11.5–15.4)
IMM GRANULOCYTES # BLD AUTO: 0.04 THOUSAND/UL (ref 0–0.2)
IMM GRANULOCYTES NFR BLD AUTO: 1 % (ref 0–2)
LIPASE SERPL-CCNC: 84 U/L (ref 11–82)
LYMPHOCYTES # BLD AUTO: 1.94 THOUSANDS/ÂΜL (ref 0.6–4.47)
LYMPHOCYTES NFR BLD AUTO: 29 % (ref 14–44)
MCH RBC QN AUTO: 30.9 PG (ref 26.8–34.3)
MCHC RBC AUTO-ENTMCNC: 33.1 G/DL (ref 31.4–37.4)
MCV RBC AUTO: 93 FL (ref 82–98)
MONOCYTES # BLD AUTO: 0.52 THOUSAND/ÂΜL (ref 0.17–1.22)
MONOCYTES NFR BLD AUTO: 8 % (ref 4–12)
NEUTROPHILS # BLD AUTO: 4 THOUSANDS/ÂΜL (ref 1.85–7.62)
NEUTS SEG NFR BLD AUTO: 61 % (ref 43–75)
NRBC BLD AUTO-RTO: 0 /100 WBCS
PLATELET # BLD AUTO: 215 THOUSANDS/UL (ref 149–390)
PMV BLD AUTO: 9.9 FL (ref 8.9–12.7)
POTASSIUM SERPL-SCNC: 3.8 MMOL/L (ref 3.5–5.3)
PROT SERPL-MCNC: 7.1 G/DL (ref 6.4–8.4)
RBC # BLD AUTO: 3.85 MILLION/UL (ref 3.81–5.12)
SODIUM SERPL-SCNC: 138 MMOL/L (ref 135–147)
WBC # BLD AUTO: 6.61 THOUSAND/UL (ref 4.31–10.16)

## 2023-01-01 RX ORDER — SODIUM CHLORIDE, SODIUM LACTATE, POTASSIUM CHLORIDE, CALCIUM CHLORIDE 600; 310; 30; 20 MG/100ML; MG/100ML; MG/100ML; MG/100ML
125 INJECTION, SOLUTION INTRAVENOUS CONTINUOUS
Status: DISCONTINUED | OUTPATIENT
Start: 2023-01-01 | End: 2023-01-03

## 2023-01-01 RX ORDER — ONDANSETRON 2 MG/ML
4 INJECTION INTRAMUSCULAR; INTRAVENOUS EVERY 6 HOURS PRN
Status: DISCONTINUED | OUTPATIENT
Start: 2023-01-01 | End: 2023-01-04 | Stop reason: HOSPADM

## 2023-01-01 RX ORDER — OXYCODONE HYDROCHLORIDE 5 MG/1
5 TABLET ORAL EVERY 4 HOURS PRN
Status: DISCONTINUED | OUTPATIENT
Start: 2023-01-01 | End: 2023-01-04 | Stop reason: HOSPADM

## 2023-01-01 RX ORDER — PANTOPRAZOLE SODIUM 20 MG/1
20 TABLET, DELAYED RELEASE ORAL
Status: DISCONTINUED | OUTPATIENT
Start: 2023-01-02 | End: 2023-01-04 | Stop reason: HOSPADM

## 2023-01-01 RX ORDER — ACETAMINOPHEN 325 MG/1
650 TABLET ORAL EVERY 6 HOURS PRN
Status: DISCONTINUED | OUTPATIENT
Start: 2023-01-01 | End: 2023-01-04

## 2023-01-01 RX ORDER — PANTOPRAZOLE SODIUM 40 MG/10ML
40 INJECTION, POWDER, LYOPHILIZED, FOR SOLUTION INTRAVENOUS ONCE
Status: COMPLETED | OUTPATIENT
Start: 2023-01-01 | End: 2023-01-01

## 2023-01-01 RX ORDER — MORPHINE SULFATE 4 MG/ML
4 INJECTION, SOLUTION INTRAMUSCULAR; INTRAVENOUS ONCE
Status: COMPLETED | OUTPATIENT
Start: 2023-01-01 | End: 2023-01-01

## 2023-01-01 RX ORDER — HEPARIN SODIUM 5000 [USP'U]/ML
5000 INJECTION, SOLUTION INTRAVENOUS; SUBCUTANEOUS EVERY 8 HOURS SCHEDULED
Status: DISCONTINUED | OUTPATIENT
Start: 2023-01-02 | End: 2023-01-02

## 2023-01-01 RX ORDER — OXYCODONE HYDROCHLORIDE 10 MG/1
10 TABLET ORAL EVERY 4 HOURS PRN
Status: DISCONTINUED | OUTPATIENT
Start: 2023-01-01 | End: 2023-01-04 | Stop reason: HOSPADM

## 2023-01-01 RX ADMIN — PANTOPRAZOLE SODIUM 40 MG: 40 INJECTION, POWDER, FOR SOLUTION INTRAVENOUS at 22:10

## 2023-01-01 RX ADMIN — MORPHINE SULFATE 4 MG: 4 INJECTION INTRAVENOUS at 22:10

## 2023-01-01 NOTE — LETTER
[address] [place] [id number]      January 13, 2023    MRN: [id number]     Phone: [phone]     Dear Ms  Alis Brandt recently had a(n)  performed on  at  [address] [place] [address] that was requested by Claudean Jubilee, DO  The study was reviewed by a radiologist, which is a physician who specializes in medical imaging  The radiologist issued a report describing his or her findings  In that report there was a finding that the radiologist felt warranted further discussion with your health care provider and that discussion would be beneficial to you  The results were sent to Claudean Jubilee, DO on    We recommend that you contact Claudean Jubilee, DO at  or set up an appointment to discuss the results of the imaging test  If you have already heard from Claudean Jubilee, DO regarding the results of your study, you can disregard this letter  This letter is not meant to alarm you, but intended to encourage you to follow-up on your results with the provider that sent you for the imaging study  In addition, we have enclosed answers to frequently asked questions by other patients who have also received a letter to review results with their health care provider (see page two)  Thank you for choosing [address] [place] [address] for your medical imaging needs  FREQUENTLY ASKED QUESTIONS    1  Why am I receiving this letter? [facility] requires us to notify patients who have findings on imaging exams that may require more testing or follow-up with a health professional within the next 3 months  2  How serious is the finding on the imaging test?  This letter is sent to all patients who may need follow-up or more testing within the next 3 months    Receiving this letter does not necessarily mean you have a life-threatening imaging finding or disease  Recommendations in the radiologist’s imaging report are general in nature and it is up to your healthcare provider to say whether those recommendations make sense for your situation  You are strongly encouraged to talk to your health care provider about the results and ask whether additional steps need to be taken  3  Where can I get a copy of the final report for my recent radiology exam?  To get a full copy of the report you can access your records online at [email or web address] or please contact [facility] at [phone] Monday through Friday between 8 am and 6 pm          4  What do I need to do now? Please contact your health care provider who requested the imaging study to discuss what further actions (if any) are needed  You may have already heard from (your ordering provider) in regard to this test in which case you can disregard this letter  NOTICE IN ACCORDANCE WITH THE [place] STATE “PATIENT TEST RESULT INFORMATION ACT OF 2018”    You are receiving this notice as a result of a determination by your diagnostic imaging service that further discussions of your test results are warranted and would be beneficial to you  The complete results of your test or tests have been or will be sent to the health care practitioner that ordered the test or tests  It is recommended that you contact your health care practitioner to discuss your results as soon as possible

## 2023-01-02 ENCOUNTER — APPOINTMENT (INPATIENT)
Dept: MRI IMAGING | Facility: HOSPITAL | Age: 43
End: 2023-01-02

## 2023-01-02 PROBLEM — K80.50 CHOLEDOCHOLITHIASIS: Status: ACTIVE | Noted: 2023-01-02

## 2023-01-02 LAB
ALBUMIN SERPL BCP-MCNC: 4.1 G/DL (ref 3.5–5)
ALP SERPL-CCNC: 197 U/L (ref 34–104)
ALT SERPL W P-5'-P-CCNC: 465 U/L (ref 7–52)
ANION GAP SERPL CALCULATED.3IONS-SCNC: 6 MMOL/L (ref 4–13)
AST SERPL W P-5'-P-CCNC: 352 U/L (ref 13–39)
BILIRUB SERPL-MCNC: 1.7 MG/DL (ref 0.2–1)
BUN SERPL-MCNC: 12 MG/DL (ref 5–25)
CALCIUM SERPL-MCNC: 9 MG/DL (ref 8.4–10.2)
CHLORIDE SERPL-SCNC: 107 MMOL/L (ref 96–108)
CO2 SERPL-SCNC: 24 MMOL/L (ref 21–32)
CREAT SERPL-MCNC: 0.72 MG/DL (ref 0.6–1.3)
ERYTHROCYTE [DISTWIDTH] IN BLOOD BY AUTOMATED COUNT: 11.8 % (ref 11.6–15.1)
GFR SERPL CREATININE-BSD FRML MDRD: 105 ML/MIN/1.73SQ M
GLUCOSE SERPL-MCNC: 87 MG/DL (ref 65–140)
HCT VFR BLD AUTO: 34.3 % (ref 34.8–46.1)
HGB BLD-MCNC: 11.8 G/DL (ref 11.5–15.4)
MCH RBC QN AUTO: 32.2 PG (ref 26.8–34.3)
MCHC RBC AUTO-ENTMCNC: 34.4 G/DL (ref 31.4–37.4)
MCV RBC AUTO: 94 FL (ref 82–98)
PLATELET # BLD AUTO: 211 THOUSANDS/UL (ref 149–390)
PMV BLD AUTO: 9.9 FL (ref 8.9–12.7)
POTASSIUM SERPL-SCNC: 4.1 MMOL/L (ref 3.5–5.3)
PROT SERPL-MCNC: 6.4 G/DL (ref 6.4–8.4)
RBC # BLD AUTO: 3.66 MILLION/UL (ref 3.81–5.12)
SODIUM SERPL-SCNC: 137 MMOL/L (ref 135–147)
WBC # BLD AUTO: 5.61 THOUSAND/UL (ref 4.31–10.16)

## 2023-01-02 RX ORDER — ENOXAPARIN SODIUM 100 MG/ML
40 INJECTION SUBCUTANEOUS
Status: DISCONTINUED | OUTPATIENT
Start: 2023-01-02 | End: 2023-01-02

## 2023-01-02 RX ORDER — HEPARIN SODIUM 5000 [USP'U]/ML
5000 INJECTION, SOLUTION INTRAVENOUS; SUBCUTANEOUS EVERY 8 HOURS SCHEDULED
Status: DISCONTINUED | OUTPATIENT
Start: 2023-01-02 | End: 2023-01-04 | Stop reason: HOSPADM

## 2023-01-02 RX ORDER — HYDROMORPHONE HCL/PF 1 MG/ML
0.5 SYRINGE (ML) INJECTION
Status: DISCONTINUED | OUTPATIENT
Start: 2023-01-02 | End: 2023-01-04 | Stop reason: HOSPADM

## 2023-01-02 RX ADMIN — PANTOPRAZOLE SODIUM 20 MG: 20 TABLET, DELAYED RELEASE ORAL at 05:53

## 2023-01-02 RX ADMIN — HYDROMORPHONE HYDROCHLORIDE 0.5 MG: 1 INJECTION, SOLUTION INTRAMUSCULAR; INTRAVENOUS; SUBCUTANEOUS at 19:29

## 2023-01-02 RX ADMIN — HEPARIN SODIUM 5000 UNITS: 5000 INJECTION INTRAVENOUS; SUBCUTANEOUS at 05:53

## 2023-01-02 RX ADMIN — SODIUM CHLORIDE, POTASSIUM CHLORIDE, SODIUM LACTATE AND CALCIUM CHLORIDE 125 ML/HR: 600; 310; 30; 20 INJECTION, SOLUTION INTRAVENOUS at 16:47

## 2023-01-02 RX ADMIN — HYDROMORPHONE HYDROCHLORIDE 0.5 MG: 1 INJECTION, SOLUTION INTRAMUSCULAR; INTRAVENOUS; SUBCUTANEOUS at 11:08

## 2023-01-02 RX ADMIN — HYDROMORPHONE HYDROCHLORIDE 0.5 MG: 1 INJECTION, SOLUTION INTRAMUSCULAR; INTRAVENOUS; SUBCUTANEOUS at 22:07

## 2023-01-02 RX ADMIN — OXYCODONE HYDROCHLORIDE 10 MG: 10 TABLET ORAL at 21:25

## 2023-01-02 RX ADMIN — SODIUM CHLORIDE, POTASSIUM CHLORIDE, SODIUM LACTATE AND CALCIUM CHLORIDE 125 ML/HR: 600; 310; 30; 20 INJECTION, SOLUTION INTRAVENOUS at 00:39

## 2023-01-02 RX ADMIN — HEPARIN SODIUM 5000 UNITS: 5000 INJECTION INTRAVENOUS; SUBCUTANEOUS at 14:18

## 2023-01-02 RX ADMIN — HEPARIN SODIUM 5000 UNITS: 5000 INJECTION INTRAVENOUS; SUBCUTANEOUS at 22:10

## 2023-01-02 RX ADMIN — OXYCODONE HYDROCHLORIDE 10 MG: 10 TABLET ORAL at 02:19

## 2023-01-02 NOTE — PLAN OF CARE
Problem: Potential for Falls  Goal: Patient will remain free of falls  Description: INTERVENTIONS:  - Educate patient/family on patient safety including physical limitations  - Instruct patient to call for assistance with activity   - Consult OT/PT to assist with strengthening/mobility   - Keep Call bell within reach  - Keep bed low and locked with side rails adjusted as appropriate  - Keep care items and personal belongings within reach  - Initiate and maintain comfort rounds  - Make Fall Risk Sign visible to staff    Problem: Nutrition/Hydration-ADULT  Goal: Nutrient/Hydration intake appropriate for improving, restoring or maintaining nutritional needs  Description: Monitor and assess patient's nutrition/hydration status for malnutrition  Collaborate with interdisciplinary team and initiate plan and interventions as ordered  Monitor patient's weight and dietary intake as ordered or per policy  Utilize nutrition screening tool and intervene as necessary  Determine patient's food preferences and provide high-protein, high-caloric foods as appropriate       INTERVENTIONS:  - Monitor oral intake, urinary output, labs, and treatment plans  - Assess nutrition and hydration status and recommend course of action  - Evaluate amount of meals eaten  - Assist patient with eating if necessary   - Allow adequate time for meals  - Recommend/ encourage appropriate diets, oral nutritional supplements, and vitamin/mineral supplements  - Order, calculate, and assess calorie counts as needed  - Recommend, monitor, and adjust tube feedings and TPN/PPN based on assessed needs  - Assess need for intravenous fluids  - Provide specific nutrition/hydration education as appropriate  - Include patient/family/caregiver in decisions related to nutrition  Outcome: Progressing   - Apply yellow socks and bracelet for high fall risk patients  - Consider moving patient to room near nurses station  Outcome: Progressing

## 2023-01-02 NOTE — CONSULTS
[facility] -[facility]   Adriano Sussy 38 y o  female MRN: [id number]    Unit/Bed#: S -01 Encounter: [id number]      Physician Requesting Consult: Dr Kings Phillips  Reason for Consult / Principal Problem:  Elevated lfts, possible CBD stone    HPI:   This is a 38 y o  female who presents with epigastric pain that radiates to her back that originally started yesterday  She reports pain began yesterday where it woke her up out of sleep  She was seen in the ED where CTA and labs demonstrated no abnormalities  Pain worsened throughout the day which prompted her to return to the hospital  Pain is worse in her back which she describes as throbbing  Pain relieved by pain medications  She denies any fevers or chills  Reported colored urine  Reports nausea, no vomiting  RUQ US showed cholelithiasis  There is mild and extrahepatic biliary duct dilatation  There is a 5 mm shadowing hyperechoic structure at the distal common bile duct at the level of pancreatic head compatible with choledocholithiasis  MRCP was ordered for today  LFTs today are improving  Bilirubin on admission was 3 64 and is currently 1 7  Patient is currently states that she was still having some pain in her back but it is better than before  Patient went for MRCP this morning but still needs additional images  She otherwise reports that she had reflux symptoms and these were worse during pregnancy  Patient does have 5 children at home        Allergies: No Known Allergies    Medications:  Current Facility-Administered Medications:   •  acetaminophen (TYLENOL) tablet 650 mg, 650 mg, Oral, Q6H PRN, Klaudia Schrader [facility],   •  heparin (porcine) subcutaneous injection 5,000 Units, 5,000 Units, Subcutaneous, Q8H [facility], Rafaela Hinojosa MD  •  HYDROmorphone (DILAUDID) injection 0 5 mg, 0 5 mg, Intravenous, Q3H PRN, Rafaela Hinojosa MD, 0 5 mg at 01/02/23 1108  •  lactated ringers infusion, 125 mL/hr, Intravenous, Continuous, Noemy Obando DO, Last Rate: 125 mL/hr at 01/02/23 0039, 125 mL/hr at 01/02/23 0039  •  ondansetron (ZOFRAN) injection 4 mg, 4 mg, Intravenous, Q6H PRN, Noemy Obando DO  •  oxyCODONE (ROXICODONE) immediate release tablet 10 mg, 10 mg, Oral, Q4H PRN, Geoff Guido DO, 10 mg at 01/02/23 0219  •  oxyCODONE (ROXICODONE) IR tablet 5 mg, 5 mg, Oral, Q4H PRN, eGoff Guido DO  •  pantoprazole (PROTONIX) EC tablet 20 mg, 20 mg, Oral, Early Morning, Jordan Guido DO, 20 mg at 01/02/23 5050    Past Medical history:  Past Medical History:   Diagnosis Date   • Abnormal Pap smear of cervix    • Deviated septum    • Ear problems    • GERD (gastroesophageal reflux disease)    • Heartburn during pregnancy    • Migraine    • Palpitations    • Varicella        Past Surgical History:   Past Surgical History:   Procedure Laterality Date   • BREAST CYST ASPIRATION Left 2014    cyst asp    • DENTAL SURGERY     • DILATION AND CURETTAGE OF UTERUS     • NASAL SEPTUM SURGERY     • OR LAPAROSCOPY W/RMVL ADNEXAL STRUCTURES Bilateral 5/10/2022    Procedure: SALPINGECTOMY, LAPAROSCOPIC;  Surgeon: Ema Corona MD;  Location: [facility];  Service: Gynecology       Family History:   Family History   Problem Relation Age of Onset   • Miscarriages / Cristela Curie Mother    • Birth defects Sister    • Drug abuse Paternal Uncle    • Arthritis Maternal Grandmother    • Cancer Maternal Grandmother    • Hyperlipidemia Maternal Grandmother    • Hypertension Maternal Grandmother    • Cancer Maternal Grandfather    • Hearing loss Maternal Grandfather    • Vision loss Maternal Grandfather    • Cancer Paternal Grandmother    • Diabetes Paternal Grandmother    • Hearing loss Paternal Grandmother    • Hyperlipidemia Paternal Grandmother    • Hypertension Paternal Grandmother    • Alcohol abuse Paternal Grandfather    • Cancer Paternal Grandfather    • Mental illness Paternal Grandfather    • Lung cancer Paternal Grandfather    • Lung disease Paternal Grandfather    • Colon cancer Maternal Aunt        Social history:   Social History     Socioeconomic History   • Marital status: /Civil Union     Spouse name: Not on file   • Number of children: Not on file   • Years of education: Not on file   • Highest education level: Not on file   Occupational History   • Not on file   Tobacco Use   • Smoking status: Never   • Smokeless tobacco: Never   Vaping Use   • Vaping Use: Never used   Substance and Sexual Activity   • Alcohol use: Yes     Comment: social/; rare   • Drug use: No   • Sexual activity: Not Currently     Partners: Male     Birth control/protection: None   Other Topics Concern   • Not on file   Social History Narrative   • Not on file     Social Determinants of Health     Financial Resource Strain: Not on file   Food Insecurity: Not on file   Transportation Needs: Not on file   Physical Activity: Not on file   Stress: Not on file   Social Connections: Not on file   Intimate Partner Violence: Not on file   Housing Stability: Not on file       Review of Systems: All other systems were reviewed and were negative, otherwise please refer to HPI    Physical Exam: /56   Pulse 55   Temp 97 9 °F (36 6 °C)   Resp 19   Ht 5' 8" (1 727 m)   Wt 80 8 kg (178 lb 2 1 oz)   LMP 12/08/2022 (Exact Date)   SpO2 97%   BMI 27 08 kg/m²     General Appearance:    Alert, cooperative, no distress, appears stated age   Head:    Normocephalic, without obvious abnormality, atraumatic   Eyes:    No scleral icterus           Mouth:  Mucosa moist   Neck:   Supple, symmetrical, trachea midline, no thyromegaly       Lungs:     Clear to auscultation bilaterally, respirations unlabored       Heart:    Regular rate and rhythm, S1 and S2 normal, no murmur, rub   or gallop     Abdomen:     Soft, non-tender, bowel sounds active all four quadrants,     no r/r/g   Genitalia:   deferred   Rectal:   deferred   Extremities:   Extremities normal,no cyanosis or edema       Skin:   Skin color, texture, turgor normal, no rashes or lesions       Neurologic:   Grossly intact, no focal deficit           Lab Results:   Recent Results (from the past 24 hour(s))   CBC and differential    Collection Time: 01/01/23 10:07 PM   Result Value Ref Range    WBC 6 61 4 31 - 10 16 Thousand/uL    RBC 3 85 3 81 - 5 12 Million/uL    Hemoglobin 11 9 11 5 - 15 4 g/dL    Hematocrit 35 9 34 8 - 46 1 %    MCV 93 82 - 98 fL    MCH 30 9 26 8 - 34 3 pg    MCHC 33 1 31 4 - 37 4 g/dL    RDW 11 8 11 6 - 15 1 %    MPV 9 9 8 9 - 12 7 fL    Platelets 979 964 - 909 Thousands/uL    nRBC 0 /100 WBCs    Neutrophils Relative 61 43 - 75 %    Immat GRANS % 1 0 - 2 %    Lymphocytes Relative 29 14 - 44 %    Monocytes Relative 8 4 - 12 %    Eosinophils Relative 1 0 - 6 %    Basophils Relative 0 0 - 1 %    Neutrophils Absolute 4 00 1 85 - 7 62 Thousands/µL    Immature Grans Absolute 0 04 0 00 - 0 20 Thousand/uL    Lymphocytes Absolute 1 94 0 60 - 4 47 Thousands/µL    Monocytes Absolute 0 52 0 17 - 1 22 Thousand/µL    Eosinophils Absolute 0 09 0 00 - 0 61 Thousand/µL    Basophils Absolute 0 02 0 00 - 0 10 Thousands/µL   Lipase    Collection Time: 01/01/23 10:07 PM   Result Value Ref Range    Lipase 84 (H) 11 - 82 u/L   Comprehensive metabolic panel    Collection Time: 01/01/23 10:07 PM   Result Value Ref Range    Sodium 138 135 - 147 mmol/L    Potassium 3 8 3 5 - 5 3 mmol/L    Chloride 104 96 - 108 mmol/L    CO2 26 21 - 32 mmol/L    ANION GAP 8 4 - 13 mmol/L    BUN 15 5 - 25 mg/dL    Creatinine 0 73 0 60 - 1 30 mg/dL    Glucose 100 65 - 140 mg/dL    Calcium 9 6 8 4 - 10 2 mg/dL     (H) 13 - 39 U/L     (H) 7 - 52 U/L    Alkaline Phosphatase 199 (H) 34 - 104 U/L    Total Protein 7 1 6 4 - 8 4 g/dL    Albumin 4 3 3 5 - 5 0 g/dL    Total Bilirubin 3 64 (H) 0 20 - 1 00 mg/dL    eGFR 103 ml/min/1 73sq m   hCG, qualitative pregnancy    Collection Time: 01/01/23 10:07 PM   Result Value Ref Range    Preg, Serum Negative Negative   High Sensitivity Troponin I Random    Collection Time: 01/01/23 10:07 PM   Result Value Ref Range    HS TnI random 2 (L) 8 - 18 ng/L   Comprehensive metabolic panel    Collection Time: 01/02/23  4:53 AM   Result Value Ref Range    Sodium 137 135 - 147 mmol/L    Potassium 4 1 3 5 - 5 3 mmol/L    Chloride 107 96 - 108 mmol/L    CO2 24 21 - 32 mmol/L    ANION GAP 6 4 - 13 mmol/L    BUN 12 5 - 25 mg/dL    Creatinine 0 72 0 60 - 1 30 mg/dL    Glucose 87 65 - 140 mg/dL    Calcium 9 0 8 4 - 10 2 mg/dL     (H) 13 - 39 U/L     (H) 7 - 52 U/L    Alkaline Phosphatase 197 (H) 34 - 104 U/L    Total Protein 6 4 6 4 - 8 4 g/dL    Albumin 4 1 3 5 - 5 0 g/dL    Total Bilirubin 1 70 (H) 0 20 - 1 00 mg/dL    eGFR 105 ml/min/1 73sq m   CBC    Collection Time: 01/02/23  4:53 AM   Result Value Ref Range    WBC 5 61 4 31 - 10 16 Thousand/uL    RBC 3 66 (L) 3 81 - 5 12 Million/uL    Hemoglobin 11 8 11 5 - 15 4 g/dL    Hematocrit 34 3 (L) 34 8 - 46 1 %    MCV 94 82 - 98 fL    MCH 32 2 26 8 - 34 3 pg    MCHC 34 4 31 4 - 37 4 g/dL    RDW 11 8 11 6 - 15 1 %    Platelets 727 097 - 965 Thousands/uL    MPV 9 9 8 9 - 12 7 fL       Imaging Studies: CTA dissection protocol chest abdomen pelvis w wo contrast    Result Date: 12/31/2022  Narrative: CTA - CHEST, ABDOMEN AND PELVIS - WITHOUT AND WITH IV CONTRAST INDICATION:   ripping chest pain, radiating to back  History of salpingectomy  COMPARISON:  None  TECHNIQUE: CT examination of the chest, abdomen and pelvis was performed both prior to and after the administration of intravenous contrast   The noncontrast portion of this examination was performed utilizing low radiation dose technique  Thin section  angiographic arterial phase post contrast technique was used in order to evaluate for aortic dissection  3D reformatted images and volume rendering were performed on an independent workstation    Additionally, axial, sagittal, and coronal 2D reformatted  images were created from the source data and submitted for interpretation  Radiation dose length product (DLP) for this visit:  563 mGy-cm   This examination, like all CT scans performed in the [facility], was performed utilizing techniques to minimize radiation dose exposure, including the use of iterative reconstruction and automated exposure control  IV Contrast:  100 mL of iohexol (OMNIPAQUE) Enteric Contrast:  Enteric contrast was not administered  FINDINGS: AORTA:  Normal aortic caliber and enhancement  No intramural hematoma  Chest:  Standard aortic arch branching  Patent subclavian and visualized internal carotid and vertebral arteries  Abdomen: Celiac artery and branches, superior and inferior mesenteric arteries are normal in caliber and enhancement  Splenic artery is patent  Single bilateral renal arteries are patent  Both common, internal and external iliac arteries and visualized femoral arteries are patent  CHEST LUNGS:  Lungs are clear  Central airways are patent  PLEURA:  Within normal limits  HEART/PULMONARY ARTERIAL TREE:  Within normal limits  MEDIASTINUM AND JULIO CÉSAR:  Within normal limits  CHEST WALL AND LOWER NECK:   Within normal limits  ABDOMEN LIVER/BILIARY TREE:  Within normal limits  GALLBLADDER:  Within normal limits  SPLEEN:  Heterogeneous enhancement, predominantly peripheral is likely due to arterial phase of imaging  Normal precontrast attenuation, morphology and size  No discrete mass  As mentioned above, splenic arteries patent  Splenic vein not evaluated  PANCREAS:  Within normal limits  ADRENAL GLANDS:  Within normal limits  KIDNEYS/URETERS:  Developmental left malrotation  No acute findings  STOMACH AND BOWEL:  Within normal limits  APPENDIX:  No findings to suggest appendicitis  ABDOMINOPELVIC CAVITY:  No abnormal air, fluid or lymphadenopathy  PELVIS REPRODUCTIVE ORGANS:  Within normal limits for patient's age   URINARY BLADDER:  Within normal limits  ABDOMINAL WALL/INGUINAL REGIONS:  Within normal limits  OSSEOUS STRUCTURES:  Within normal limits  Impression: No aortic dissection  No acute findings in the chest, abdomen or pelvis  Nonemergent findings above  Workstation performed: [id number]     US right upper quadrant    Result Date: 1/1/2023  Narrative: RIGHT UPPER QUADRANT ULTRASOUND INDICATION:     RUQ abd pain  COMPARISON:  CT of the chest abdomen pelvis on December 31, 2022  TECHNIQUE:   Real-time ultrasound of the right upper quadrant was performed with a curvilinear transducer with both volumetric sweeps and still imaging techniques  FINDINGS: PANCREAS:  Visualized portions of the pancreas are within normal limits  AORTA AND IVC:  Visualized portions are normal for patient age  LIVER: Size:   The liver measures 18 2 cm in the midclavicular line  Contour:  Surface contour is smooth  Parenchyma:  Echogenicity and echotexture are within normal limits  No liver mass identified  Limited imaging of the main portal vein shows it to be patent and hepatopetal   BILIARY: The gallbladder is normal in caliber  No wall thickening or pericholecystic fluid  There is cholelithiasis  No sonographic Mcguire sign  There is mild intrahepatic biliary dilatation  CBD measures 7 mm at liver hilum and tapers to 4 mm at the level of the pancreatic head  There is a 5 mm shadowing hyperechoic structure at the distal common bile duct at the level of the pancreatic head compatible with choledocholithiasis  KIDNEY: Right kidney measures 11 6 x 4 1 x 4 6 cm  Volume 115 6 mL Kidney within normal limits  ASCITES:   None  Impression: Cholelithiasis  There is mild and extrahepatic biliary duct dilatation  There is a 5 mm shadowing hyperechoic structure at the distal common bile duct at the level of pancreatic head compatible with choledocholithiasis  The study was marked in [facility] for immediate notification   Workstation performed: [id number]       Assessment/Plan: This is a 63-year-old female who presents with back pain and right upper quadrant pain  Patient did have prior episode of pain and was seen initially in the ER CTA dissection scan was negative  Patient had ultrasound which had shown cholelithiasis with mild and extrahepatic biliary ductal dilatation with a 5 mm hypoechoic structure in the distal common bile duct  Bilirubin has improved today along with other LFTs so MRCP was just completed and results are pending  Spoke with surgical resident and she will be kept n p o  in case MRCP is negative and they will take 2 OR today for cholecystectomy  If MRCP is positive for choledocholithiasis and will need ERCP prior to cholecystectomy  Continue to trend LFTs  Thank you for the consultation  Patient was seen and examined with Dr Anita Fortune

## 2023-01-02 NOTE — PROGRESS NOTES
Progress Note - Gatito Domínguez 38 y o  female MRN: [id number]    Unit/Bed#: S -01 Encounter: [id number]      Assessment:  42F w/ choledocholithiasis    AVSS  Bili 1 70 (3 64)  AST//465 (409/473)  CBC WNL    Plan:  -Will get MRCP this AM  If MRCP negative can do lap arleen later this afternoon  However, if positive will require ERCP  -Follow up GI c/s  -OMER  -Vishal@2threads  -Pain/nausea control PRN  -DVT ppx    Subjective:   Feeling much better this morning  Denies any fevers/chills  No nausea or vomiting  Improved abd pain    Objective:     Vitals: Blood pressure (!) 105/49, pulse (!) 50, temperature 98 4 °F (36 9 °C), resp  rate 16, height 5' 8" (1 727 m), weight 80 8 kg (178 lb 2 1 oz), last menstrual period 12/08/2022, SpO2 96 %, currently breastfeeding  ,Body mass index is 27 08 kg/m²  Intake/Output Summary (Last 24 hours) at 1/2/2023 0703  Last data filed at 1/2/2023 0201  Gross per 24 hour   Intake --   Output 200 ml   Net -200 ml       Physical Exam:   General: NAD  HENT: NCAT MMM  Neck: supple, no JVD  CV: nl rate  Lungs: nl wob  No resp distress  ABD: Soft, nontender, nondistended  Extrem: No CCE  Neuro: AAOx3       Invasive Devices     Peripheral Intravenous Line  Duration           Peripheral IV 01/01/23 Dorsal (posterior); Right Forearm <1 day

## 2023-01-02 NOTE — QUICK NOTE
Went to perform a quick assessment of the patient  Patient was down in MRI for her MRCP  Patient's mother was still in the room  I informed the patient's mother that the current plan was to obtain an MRCP in order to assess whether or not the patient had choledocholithiasis  If the patient did have choledocholithiasis then the next step would be to proceed with an ERCP through GI  If the patient had no signs of choledocholithiasis, then we would proceed with a laparoscopic cholecystectomy this afternoon  I informed the patient's mother that I will follow-up with radiology in order to determine the final results of the MRCP  The MRCP is currently in progress

## 2023-01-02 NOTE — PROGRESS NOTES
Progress Note - General Surgery   [facility] Resident on Surgery Service   Tino Zavala 38 y o  female MRN: [id number]  Unit/Bed#: S -01 Encounter: [id number]    Assessment:  42F w/ choledocholithiasis, original plan was to proceed to OR for lap choley and IOC, however will wait for GI to perform ERCP first     Afebrile  VSS   UOP 1000 (0 recorded overnight but patient states she urinated multiple times)  K is 4 2  Cr 0 78   from 352   from 465  Alk phos 240 from 197  T bili 3 09 from 1 70  D bili 1 73    Plan:  -NPO  -IV fluids  -Pre-op abx  -PRN Pain meds  -PRN anti-nausea  -DVT ppx  -GI for ERCP  -Plan for lap choley with IOC today after ERCP or tomorrow    Subjective/Objective     Subjective: NAEO  Patient states she has a lot of abdominal pain  Also endorses pain in her back and diaphragm  Endorses nausea but no vomiting  Denies having fevers and chills  Passing flatus but not having any bowel movements  Urinating w/o difficulty  Objective:     Blood pressure 107/56, pulse 55, temperature 97 9 °F (36 6 °C), resp  rate 19, height 5' 8" (1 727 m), weight 80 8 kg (178 lb 2 1 oz), last menstrual period 12/08/2022, SpO2 97 %, not currently breastfeeding  ,Body mass index is 27 08 kg/m²  Intake/Output Summary (Last 24 hours) at 1/2/2023 1116  Last data filed at 1/2/2023 1010  Gross per 24 hour   Intake --   Output 1000 ml   Net -1000 ml       Invasive Devices     Peripheral Intravenous Line  Duration           Peripheral IV 01/01/23 Dorsal (posterior); Right Forearm <1 day                General: NAD  HENT: NCAT MMM  Neck: supple, no JVD  CV: nl rate  Lungs: nl wob   No resp distress  ABD: Soft, TTP RUQ, nondistended  Extrem: No CCE  Neuro: AAOx3       Scheduled Meds:  Current Facility-Administered Medications   Medication Dose Route Frequency Provider Last Rate   • acetaminophen  650 mg Oral Q6H PRN Pratik Guido DO     • HYDROmorphone  0 5 mg Intravenous Q3H PRN Esmer Hilario Eddi Jeter MD     • lactated ringers  125 mL/hr Intravenous Continuous Abram Printers,  mL/hr (01/02/23 0039)   • ondansetron  4 mg Intravenous Q6H PRN Abram Printers, DO     • oxyCODONE  10 mg Oral Q4H PRN Abram Printers, DO     • oxyCODONE  5 mg Oral Q4H PRN Abram Printers, DO     • pantoprazole  20 mg Oral Early Morning Jordan Guido DO       Continuous Infusions:lactated ringers, 125 mL/hr, Last Rate: 125 mL/hr (01/02/23 0039)      PRN Meds: •  acetaminophen  •  HYDROmorphone  •  ondansetron  •  oxyCODONE  •  oxyCODONE      Lab, Imaging and other studies:  I have personally reviewed pertinent lab results    , CBC:   Lab Results   Component Value Date    WBC 5 61 01/02/2023    HGB 11 8 01/02/2023    HCT 34 3 (L) 01/02/2023    MCV 94 01/02/2023     01/02/2023    MCH 32 2 01/02/2023    MCHC 34 4 01/02/2023    RDW 11 8 01/02/2023    MPV 9 9 01/02/2023    NRBC 0 01/01/2023   , CMP:   Lab Results   Component Value Date    SODIUM 137 01/02/2023    K 4 1 01/02/2023     01/02/2023    CO2 24 01/02/2023    BUN 12 01/02/2023    CREATININE 0 72 01/02/2023    CALCIUM 9 0 01/02/2023     (H) 01/02/2023     (H) 01/02/2023    ALKPHOS 197 (H) 01/02/2023    EGFR 105 01/02/2023   , Coagulation: No results found for: PT, INR, APTT, Urinalysis: No results found for: [place] Fraction, SPECGRAV, PHUR, LEUKOCYTESUR, NITRITE, PROTEINUA, GLUCOSEU, KETONESU, BILIRUBINUR, BLOODU, Amylase: No results found for: AMYLASE, Lipase:   Lab Results   Component Value Date    LIPASE 84 (H) 01/01/2023     VTE Pharmacologic Prophylaxis: Heparin  VTE Mechanical Prophylaxis: sequential compression device      Judson Oreilly MD  1/2/2023 11:16 AM

## 2023-01-02 NOTE — ED PROVIDER NOTES
History  Chief Complaint   Patient presents with   • Abdominal Pain     Pt seen yesterday for abdominal/back pain  Told that it could possibly be gallbladder related and to comeback if pain gets worse  Patient states pain is worse after eating  +Nausea     81-year-old female presents with right upper quadrant pain  Patient was in the ER yesterday and had CT dissection scan done yesterday which was normal patient was discharged home  Today pain returned  She is having right upper quadrant pain radiating to her back  No nausea vomiting  No fever or chills  History provided by:  Patient  Abdominal Pain  Associated symptoms: no fatigue, no hematuria and no shortness of breath        Prior to Admission Medications   Prescriptions Last Dose Informant Patient Reported?  Taking?   multivitamin (THERAGRAN) TABS   Yes No   Sig: Take 1 tablet by mouth daily   naproxen (NAPROSYN) 500 mg tablet   Yes No   Sig: TAKE 1 TABLET BY MOUTH TWICE A DAY FOR 14 DAYS   pantoprazole (PROTONIX) 20 mg tablet   No No   Sig: Take 1 tablet (20 mg total) by mouth daily      Facility-Administered Medications: None       Past Medical History:   Diagnosis Date   • Abnormal Pap smear of cervix    • Deviated septum    • Ear problems    • GERD (gastroesophageal reflux disease)    • Heartburn during pregnancy    • Migraine    • Palpitations    • Varicella        Past Surgical History:   Procedure Laterality Date   • BREAST CYST ASPIRATION Left 2014    cyst asp    • DENTAL SURGERY     • DILATION AND CURETTAGE OF UTERUS     • NASAL SEPTUM SURGERY     • MA LAPAROSCOPY W/RMVL ADNEXAL STRUCTURES Bilateral 5/10/2022    Procedure: SALPINGECTOMY, LAPAROSCOPIC;  Surgeon: Vicente Noble MD;  Location: [facility];  Service: Gynecology       Family History   Problem Relation Age of Onset   • Miscarriages / [place] Mother    • Birth defects Sister    • Drug abuse Paternal Uncle    • Arthritis Maternal Grandmother    • Cancer Maternal Grandmother • Hyperlipidemia Maternal Grandmother    • Hypertension Maternal Grandmother    • Cancer Maternal Grandfather    • Hearing loss Maternal Grandfather    • Vision loss Maternal Grandfather    • Cancer Paternal Grandmother    • Diabetes Paternal Grandmother    • Hearing loss Paternal Grandmother    • Hyperlipidemia Paternal Grandmother    • Hypertension Paternal Grandmother    • Alcohol abuse Paternal Grandfather    • Cancer Paternal Grandfather    • Mental illness Paternal Grandfather    • Lung cancer Paternal Grandfather    • Lung disease Paternal Grandfather    • Colon cancer Maternal Aunt      I have reviewed and agree with the history as documented  E-Cigarette/Vaping   • E-Cigarette Use Never User      E-Cigarette/Vaping Substances   • Nicotine No    • THC No    • CBD No    • Flavoring No    • Other No    • Unknown No      Social History     Tobacco Use   • Smoking status: Never   • Smokeless tobacco: Never   Vaping Use   • Vaping Use: Never used   Substance Use Topics   • Alcohol use: Yes     Comment: social/; rare   • Drug use: No       Review of Systems   Constitutional: Negative for activity change, appetite change and fatigue  HENT: Negative for congestion, drooling and facial swelling  Eyes: Negative for pain, discharge, redness and itching  Respiratory: Negative for apnea, choking and shortness of breath  Gastrointestinal: Positive for abdominal pain  Endocrine: Negative for cold intolerance, polydipsia and polyphagia  Genitourinary: Negative for difficulty urinating, enuresis, flank pain, frequency and hematuria  Musculoskeletal: Negative for arthralgias  Skin: Negative for color change  Allergic/Immunologic: Negative for environmental allergies  Neurological: Negative for dizziness  Hematological: Negative for adenopathy  Psychiatric/Behavioral: Negative for agitation and behavioral problems         Physical Exam  Physical Exam  Constitutional:       Appearance: She is well-developed  HENT:      Head: Normocephalic and atraumatic  Mouth/Throat:      Mouth: Mucous membranes are moist    Eyes:      Extraocular Movements: Extraocular movements intact  Pupils: Pupils are equal, round, and reactive to light  Cardiovascular:      Rate and Rhythm: Normal rate and regular rhythm  Heart sounds: Normal heart sounds  Pulmonary:      Effort: Pulmonary effort is normal       Breath sounds: Normal breath sounds  Abdominal:      General: Bowel sounds are normal       Palpations: Abdomen is soft  Tenderness: There is abdominal tenderness in the right upper quadrant  Hernia: No hernia is present  Skin:     General: Skin is warm  Capillary Refill: Capillary refill takes less than 2 seconds  Neurological:      General: No focal deficit present  Mental Status: She is alert           Vital Signs  ED Triage Vitals [01/01/23 2057]   Temperature Pulse Respirations Blood Pressure SpO2   97 5 °F (36 4 °C) 55 16 109/52 99 %      Temp Source Heart Rate Source Patient Position - Orthostatic VS BP Location FiO2 (%)   Oral Monitor Sitting Right arm --      Pain Score       --           Vitals:    01/01/23 2057 01/01/23 2100 01/01/23 2300   BP: 109/52 109/52 121/62   Pulse: 55 (!) 54 58   Patient Position - Orthostatic VS: Sitting  Lying         Visual Acuity      ED Medications  Medications   pantoprazole (PROTONIX) EC tablet 20 mg (has no administration in time range)   lactated ringers infusion (has no administration in time range)   ondansetron (ZOFRAN) injection 4 mg (has no administration in time range)   heparin (porcine) subcutaneous injection 5,000 Units (has no administration in time range)   acetaminophen (TYLENOL) tablet 650 mg (has no administration in time range)   oxyCODONE (ROXICODONE) IR tablet 5 mg (has no administration in time range)   oxyCODONE (ROXICODONE) immediate release tablet 10 mg (has no administration in time range)   pantoprazole (PROTONIX) injection 40 mg (40 mg Intravenous Given 1/1/23 2210)   morphine injection 4 mg (4 mg Intravenous Given 1/1/23 2210)       Diagnostic Studies  Results Reviewed     Procedure Component Value Units Date/Time    hCG, qualitative pregnancy [[id number]]  (Normal) Collected: 01/01/23 2207    Lab Status: Final result Specimen: Blood from Arm, Right Updated: 01/01/23 2318     Preg, Serum Negative    Lipase [[id number]]  (Abnormal) Collected: 01/01/23 2207    Lab Status: Final result Specimen: Blood from Arm, Right Updated: 01/01/23 2256     Lipase 84 u/L     Comprehensive metabolic panel [[id number]]  (Abnormal) Collected: 01/01/23 2207    Lab Status: Final result Specimen: Blood from Arm, Right Updated: 01/01/23 2256     Sodium 138 mmol/L      Potassium 3 8 mmol/L      Chloride 104 mmol/L      CO2 26 mmol/L      ANION GAP 8 mmol/L      BUN 15 mg/dL      Creatinine 0 73 mg/dL      Glucose 100 mg/dL      Calcium 9 6 mg/dL       U/L       U/L      Alkaline Phosphatase 199 U/L      Total Protein 7 1 g/dL      Albumin 4 3 g/dL      Total Bilirubin 3 64 mg/dL      eGFR 103 ml/min/1 73sq m     Narrative:      [place] guidelines for Chronic Kidney Disease (CKD):   •  Stage 1 with normal or high GFR (GFR > 90 mL/min/1 73 square meters)  •  Stage 2 Mild CKD (GFR = 60-89 mL/min/1 73 square meters)  •  Stage 3A Moderate CKD (GFR = 45-59 mL/min/1 73 square meters)  •  Stage 3B Moderate CKD (GFR = 30-44 mL/min/1 73 square meters)  •  Stage 4 Severe CKD (GFR = 15-29 mL/min/1 73 square meters)  •  Stage 5 End Stage CKD (GFR <15 mL/min/1 73 square meters)  Note: GFR calculation is accurate only with a steady state creatinine    High Sensitivity Troponin I Random [[id number]]  (Abnormal) Collected: 01/01/23 2207    Lab Status: Final result Specimen: Blood from Arm, Right Updated: 01/01/23 2253     HS TnI random 2 ng/L     CBC and differential [[id number]] Collected: 01/01/23 2207    Lab Status: Final result Specimen: Blood from Arm, Right Updated: 01/01/23 2226     WBC 6 61 Thousand/uL      RBC 3 85 Million/uL      Hemoglobin 11 9 g/dL      Hematocrit 35 9 %      MCV 93 fL      MCH 30 9 pg      MCHC 33 1 g/dL      RDW 11 8 %      MPV 9 9 fL      Platelets 496 Thousands/uL      nRBC 0 /100 WBCs      Neutrophils Relative 61 %      Immat GRANS % 1 %      Lymphocytes Relative 29 %      Monocytes Relative 8 %      Eosinophils Relative 1 %      Basophils Relative 0 %      Neutrophils Absolute 4 00 Thousands/µL      Immature Grans Absolute 0 04 Thousand/uL      Lymphocytes Absolute 1 94 Thousands/µL      Monocytes Absolute 0 52 Thousand/µL      Eosinophils Absolute 0 09 Thousand/µL      Basophils Absolute 0 02 Thousands/µL                  US right upper quadrant   Final Result by Michelle Osullivan MD (01/01 2211)      Cholelithiasis  There is mild and extrahepatic biliary duct dilatation  There is a 5 mm shadowing hyperechoic structure at the distal common bile duct at the level of pancreatic head compatible with choledocholithiasis  The study was marked in [facility] for immediate notification  Workstation performed: [id number]         XR chest 1 view portable    (Results Pending)              Procedures  Procedures         ED Course     Surgery consulted for choledocholithiasis  They recommend admitting patient to their service for further management care  Patient admitted to Dr Tia Pena service                                           Medical Decision Making      Disposition  Final diagnoses:   Choledocholithiasis     Time reflects when diagnosis was documented in both MDM as applicable and the Disposition within this note     Time User Action Codes Description Comment    1/1/2023 11:13 PM Dulce Guido Add [K80 50] Choledocholithiasis       ED Disposition     None      Follow-up Information    None         Patient's Medications   Discharge Prescriptions    No medications on file       No discharge procedures on file      PDMP Review     None          ED Provider  Electronically Signed by           Eron Figueroa DO  01/01/23 6270

## 2023-01-02 NOTE — H&P
H&P Exam - General Surgery   Frankey Height 38 y o  female MRN: [id number]  Unit/Bed#: ED-40 Encounter: [id number]    Assessment/Plan     Assessment:  42F w/ choledocholithiasis    Plan:  -Patient w/ evidence of choledocholithiasis, no evidence of cholangitis or acute cholecystitis  Will consult GI for ERCP  After duct clearance will require laparoscopic cholecystectomy this admission  -[email or web address]  [email or web address]  -Trend LFTs  -Pain/nausea control PRN  -DVT ppx    History of Present Illness   HPI:  Frankey Height is a 38 y o  female who presents with epigastric pain that radiates to her back that originally started yesterday  She reports pain began yesterday where it woke her up out of sleep  She was seen in the ED where CTA and labs demonstrated no abnormalities  Pain worsened throughout the day which prompted her to return to the hospital  Pain is worse in her back which she describes as throbbing  Pain relieved by pain medications  She denies any fevers or chills  Endorses orange colored urine  Reports nausea, no vomiting  Has never had this pain previously  No AC/AP  Only abd surgery is laparoscopic salpingectomy  Review of Systems   Constitutional: Positive for chills  Negative for fever  HENT: Negative  Eyes: Negative  Respiratory: Negative  Cardiovascular: Positive for chest pain  Gastrointestinal: Positive for abdominal pain and nausea  Negative for vomiting  Endocrine: Negative  Genitourinary:        Urine is orange     Musculoskeletal: Negative  Skin: Negative  Allergic/Immunologic: Negative  Neurological: Negative  Hematological: Negative  Psychiatric/Behavioral: Negative          Historical Information   Past Medical History:   Diagnosis Date   • Abnormal Pap smear of cervix    • Deviated septum    • Ear problems    • GERD (gastroesophageal reflux disease)    • Heartburn during pregnancy    • Migraine    • Palpitations    • Varicella      Past Surgical History:   Procedure Laterality Date   • BREAST CYST ASPIRATION Left 2014    cyst asp    • DENTAL SURGERY     • DILATION AND CURETTAGE OF UTERUS     • NASAL SEPTUM SURGERY     • UT LAPAROSCOPY W/RMVL ADNEXAL STRUCTURES Bilateral 5/10/2022    Procedure: SALPINGECTOMY, LAPAROSCOPIC;  Surgeon: Hussain Cristina MD;  Location: [facility];  Service: Gynecology     Social History   Social History     Substance and Sexual Activity   Alcohol Use Yes    Comment: social/; rare     Social History     Substance and Sexual Activity   Drug Use No     Social History     Tobacco Use   Smoking Status Never   Smokeless Tobacco Never     E-Cigarette/Vaping   • E-Cigarette Use Never User      E-Cigarette/Vaping Substances   • Nicotine No    • THC No    • CBD No    • Flavoring No    • Other No    • Unknown No      Family History:   Family History   Problem Relation Age of Onset   • Miscarriages / Stillbirths Mother    • Birth defects Sister    • Drug abuse Paternal Uncle    • Arthritis Maternal Grandmother    • Cancer Maternal Grandmother    • Hyperlipidemia Maternal Grandmother    • Hypertension Maternal Grandmother    • Cancer Maternal Grandfather    • Hearing loss Maternal Grandfather    • Vision loss Maternal Grandfather    • Cancer Paternal Grandmother    • Diabetes Paternal Grandmother    • Hearing loss Paternal Grandmother    • Hyperlipidemia Paternal Grandmother    • Hypertension Paternal Grandmother    • Alcohol abuse Paternal Grandfather    • Cancer Paternal Grandfather    • Mental illness Paternal Grandfather    • Lung cancer Paternal Grandfather    • Lung disease Paternal Grandfather    • Colon cancer Maternal Aunt        Meds/Allergies   all medications and allergies reviewed  No Known Allergies    Objective   First Vitals:   Blood Pressure: 109/52 (01/01/23 2057)  Pulse: 55 (01/01/23 2057)  Temperature: 97 5 °F (36 4 °C) (01/01/23 2057)  Temp Source: Oral (01/01/23 2057)  Respirations: 16 (01/01/23 2057)  Weight - Scale: 80 8 kg (178 lb 2 1 oz) (01/01/23 2057)  SpO2: 99 % (01/01/23 2057)    Current Vitals:   Blood Pressure: 121/62 (01/01/23 2300)  Pulse: 58 (01/01/23 2300)  Temperature: 97 5 °F (36 4 °C) (01/01/23 2057)  Temp Source: Oral (01/01/23 2057)  Respirations: 16 (01/01/23 2300)  Weight - Scale: 80 8 kg (178 lb 2 1 oz) (01/01/23 2057)  SpO2: 100 % (01/01/23 2300)    No intake or output data in the 24 hours ending 01/01/23 2323    Invasive Devices     Peripheral Intravenous Line  Duration           Peripheral IV 01/01/23 Dorsal (posterior); Right Forearm <1 day                Physical Exam  General: NAD  HENT: NCAT MMM  Neck: supple, no JVD  CV: nl rate  Lungs: nl wob  No resp distress  ABD: Soft, nontender, nondistended  Extrem: No CCE  Neuro: AAOx3    Lab Results:   I have personally reviewed pertinent lab results    , CBC:   Lab Results   Component Value Date    WBC 6 61 01/01/2023    HGB 11 9 01/01/2023    HCT 35 9 01/01/2023    MCV 93 01/01/2023     01/01/2023    MCH 30 9 01/01/2023    MCHC 33 1 01/01/2023    RDW 11 8 01/01/2023    MPV 9 9 01/01/2023    NRBC 0 01/01/2023   , CMP:   Lab Results   Component Value Date    SODIUM 138 01/01/2023    K 3 8 01/01/2023     01/01/2023    CO2 26 01/01/2023    BUN 15 01/01/2023    CREATININE 0 73 01/01/2023    CALCIUM 9 6 01/01/2023     (H) 01/01/2023     (H) 01/01/2023    ALKPHOS 199 (H) 01/01/2023    EGFR 103 01/01/2023   , Coagulation: No results found for: PT, INR, APTT  Imaging: I have personally reviewed pertinent films in PACS  EKG, Pathology, and Other Studies: I have personally reviewed pertinent films in PACS    Code Status: Level 1 - Full Code  Advance Directive and Living Will:      Power of :    POLST:

## 2023-01-03 ENCOUNTER — ANESTHESIA (INPATIENT)
Dept: GASTROENTEROLOGY | Facility: HOSPITAL | Age: 43
End: 2023-01-03

## 2023-01-03 ENCOUNTER — ANESTHESIA EVENT (INPATIENT)
Dept: PERIOP | Facility: HOSPITAL | Age: 43
End: 2023-01-03

## 2023-01-03 ENCOUNTER — APPOINTMENT (INPATIENT)
Dept: RADIOLOGY | Facility: HOSPITAL | Age: 43
End: 2023-01-03

## 2023-01-03 ENCOUNTER — ANESTHESIA EVENT (INPATIENT)
Dept: GASTROENTEROLOGY | Facility: HOSPITAL | Age: 43
End: 2023-01-03

## 2023-01-03 ENCOUNTER — APPOINTMENT (INPATIENT)
Dept: GASTROENTEROLOGY | Facility: HOSPITAL | Age: 43
End: 2023-01-03

## 2023-01-03 LAB
ALBUMIN SERPL BCP-MCNC: 3.9 G/DL (ref 3.5–5)
ALP SERPL-CCNC: 240 U/L (ref 34–104)
ALT SERPL W P-5'-P-CCNC: 409 U/L (ref 7–52)
ANION GAP SERPL CALCULATED.3IONS-SCNC: 4 MMOL/L (ref 4–13)
APTT PPP: 26 SECONDS (ref 23–37)
AST SERPL W P-5'-P-CCNC: 263 U/L (ref 13–39)
ATRIAL RATE: 70 BPM
BILIRUB DIRECT SERPL-MCNC: 1.73 MG/DL (ref 0–0.2)
BILIRUB SERPL-MCNC: 3.09 MG/DL (ref 0.2–1)
BUN SERPL-MCNC: 10 MG/DL (ref 5–25)
CALCIUM SERPL-MCNC: 9 MG/DL (ref 8.4–10.2)
CHLORIDE SERPL-SCNC: 105 MMOL/L (ref 96–108)
CO2 SERPL-SCNC: 28 MMOL/L (ref 21–32)
CREAT SERPL-MCNC: 0.78 MG/DL (ref 0.6–1.3)
GFR SERPL CREATININE-BSD FRML MDRD: 95 ML/MIN/1.73SQ M
GLUCOSE SERPL-MCNC: 95 MG/DL (ref 65–140)
INR PPP: 0.93 (ref 0.84–1.19)
LIPASE SERPL-CCNC: 65 U/L (ref 11–82)
P AXIS: 65 DEGREES
POTASSIUM SERPL-SCNC: 4.2 MMOL/L (ref 3.5–5.3)
PR INTERVAL: 148 MS
PROT SERPL-MCNC: 6.5 G/DL (ref 6.4–8.4)
PROTHROMBIN TIME: 12.7 SECONDS (ref 11.6–14.5)
QRS AXIS: 92 DEGREES
QRSD INTERVAL: 94 MS
QT INTERVAL: 398 MS
QTC INTERVAL: 429 MS
SODIUM SERPL-SCNC: 137 MMOL/L (ref 135–147)
T WAVE AXIS: 79 DEGREES
VENTRICULAR RATE: 70 BPM

## 2023-01-03 PROCEDURE — 0FC98ZZ EXTIRPATION OF MATTER FROM COMMON BILE DUCT, VIA NATURAL OR ARTIFICIAL OPENING ENDOSCOPIC: ICD-10-PCS | Performed by: INTERNAL MEDICINE

## 2023-01-03 RX ORDER — CEFAZOLIN SODIUM 2 G/50ML
2000 SOLUTION INTRAVENOUS
Status: COMPLETED | OUTPATIENT
Start: 2023-01-04 | End: 2023-01-04

## 2023-01-03 RX ORDER — FENTANYL CITRATE 50 UG/ML
INJECTION, SOLUTION INTRAMUSCULAR; INTRAVENOUS AS NEEDED
Status: DISCONTINUED | OUTPATIENT
Start: 2023-01-03 | End: 2023-01-03

## 2023-01-03 RX ORDER — NEOSTIGMINE METHYLSULFATE 1 MG/ML
INJECTION INTRAVENOUS AS NEEDED
Status: DISCONTINUED | OUTPATIENT
Start: 2023-01-03 | End: 2023-01-03

## 2023-01-03 RX ORDER — DEXAMETHASONE SODIUM PHOSPHATE 10 MG/ML
INJECTION, SOLUTION INTRAMUSCULAR; INTRAVENOUS AS NEEDED
Status: DISCONTINUED | OUTPATIENT
Start: 2023-01-03 | End: 2023-01-03

## 2023-01-03 RX ORDER — HYDROMORPHONE HCL IN WATER/PF 6 MG/30 ML
0.2 PATIENT CONTROLLED ANALGESIA SYRINGE INTRAVENOUS
Status: DISCONTINUED | OUTPATIENT
Start: 2023-01-03 | End: 2023-01-04 | Stop reason: HOSPADM

## 2023-01-03 RX ORDER — HYDRALAZINE HYDROCHLORIDE 20 MG/ML
5 INJECTION INTRAMUSCULAR; INTRAVENOUS
Status: DISCONTINUED | OUTPATIENT
Start: 2023-01-03 | End: 2023-01-04 | Stop reason: HOSPADM

## 2023-01-03 RX ORDER — METOCLOPRAMIDE HYDROCHLORIDE 5 MG/ML
10 INJECTION INTRAMUSCULAR; INTRAVENOUS ONCE AS NEEDED
Status: DISCONTINUED | OUTPATIENT
Start: 2023-01-03 | End: 2023-01-04 | Stop reason: HOSPADM

## 2023-01-03 RX ORDER — SODIUM CHLORIDE, SODIUM LACTATE, POTASSIUM CHLORIDE, CALCIUM CHLORIDE 600; 310; 30; 20 MG/100ML; MG/100ML; MG/100ML; MG/100ML
125 INJECTION, SOLUTION INTRAVENOUS CONTINUOUS
Status: DISCONTINUED | OUTPATIENT
Start: 2023-01-03 | End: 2023-01-04

## 2023-01-03 RX ORDER — METRONIDAZOLE 500 MG/100ML
500 INJECTION, SOLUTION INTRAVENOUS
Status: COMPLETED | OUTPATIENT
Start: 2023-01-04 | End: 2023-01-04

## 2023-01-03 RX ORDER — ROCURONIUM BROMIDE 10 MG/ML
INJECTION, SOLUTION INTRAVENOUS AS NEEDED
Status: DISCONTINUED | OUTPATIENT
Start: 2023-01-03 | End: 2023-01-03

## 2023-01-03 RX ORDER — LABETALOL HYDROCHLORIDE 5 MG/ML
10 INJECTION, SOLUTION INTRAVENOUS
Status: DISCONTINUED | OUTPATIENT
Start: 2023-01-03 | End: 2023-01-04 | Stop reason: HOSPADM

## 2023-01-03 RX ORDER — LIDOCAINE HYDROCHLORIDE 10 MG/ML
INJECTION, SOLUTION EPIDURAL; INFILTRATION; INTRACAUDAL; PERINEURAL AS NEEDED
Status: DISCONTINUED | OUTPATIENT
Start: 2023-01-03 | End: 2023-01-03

## 2023-01-03 RX ORDER — SODIUM CHLORIDE, SODIUM LACTATE, POTASSIUM CHLORIDE, CALCIUM CHLORIDE 600; 310; 30; 20 MG/100ML; MG/100ML; MG/100ML; MG/100ML
125 INJECTION, SOLUTION INTRAVENOUS CONTINUOUS
Status: DISCONTINUED | OUTPATIENT
Start: 2023-01-03 | End: 2023-01-03

## 2023-01-03 RX ORDER — ONDANSETRON 2 MG/ML
4 INJECTION INTRAMUSCULAR; INTRAVENOUS ONCE AS NEEDED
Status: DISCONTINUED | OUTPATIENT
Start: 2023-01-03 | End: 2023-01-04 | Stop reason: HOSPADM

## 2023-01-03 RX ORDER — FENTANYL CITRATE/PF 50 MCG/ML
25 SYRINGE (ML) INJECTION
Status: DISCONTINUED | OUTPATIENT
Start: 2023-01-03 | End: 2023-01-04 | Stop reason: HOSPADM

## 2023-01-03 RX ORDER — ONDANSETRON 2 MG/ML
INJECTION INTRAMUSCULAR; INTRAVENOUS AS NEEDED
Status: DISCONTINUED | OUTPATIENT
Start: 2023-01-03 | End: 2023-01-03

## 2023-01-03 RX ORDER — PROMETHAZINE HYDROCHLORIDE 25 MG/ML
12.5 INJECTION, SOLUTION INTRAMUSCULAR; INTRAVENOUS ONCE AS NEEDED
Status: DISCONTINUED | OUTPATIENT
Start: 2023-01-03 | End: 2023-01-04 | Stop reason: HOSPADM

## 2023-01-03 RX ORDER — ALBUTEROL SULFATE 2.5 MG/3ML
2.5 SOLUTION RESPIRATORY (INHALATION) ONCE AS NEEDED
Status: DISCONTINUED | OUTPATIENT
Start: 2023-01-03 | End: 2023-01-04 | Stop reason: HOSPADM

## 2023-01-03 RX ORDER — GLYCOPYRROLATE 0.2 MG/ML
INJECTION INTRAMUSCULAR; INTRAVENOUS AS NEEDED
Status: DISCONTINUED | OUTPATIENT
Start: 2023-01-03 | End: 2023-01-03

## 2023-01-03 RX ORDER — HYDROMORPHONE HCL/PF 1 MG/ML
0.5 SYRINGE (ML) INJECTION
Status: DISCONTINUED | OUTPATIENT
Start: 2023-01-03 | End: 2023-01-04 | Stop reason: HOSPADM

## 2023-01-03 RX ORDER — SODIUM CHLORIDE, SODIUM LACTATE, POTASSIUM CHLORIDE, CALCIUM CHLORIDE 600; 310; 30; 20 MG/100ML; MG/100ML; MG/100ML; MG/100ML
INJECTION, SOLUTION INTRAVENOUS CONTINUOUS PRN
Status: DISCONTINUED | OUTPATIENT
Start: 2023-01-03 | End: 2023-01-03

## 2023-01-03 RX ORDER — PROPOFOL 10 MG/ML
INJECTION, EMULSION INTRAVENOUS AS NEEDED
Status: DISCONTINUED | OUTPATIENT
Start: 2023-01-03 | End: 2023-01-03

## 2023-01-03 RX ADMIN — FENTANYL CITRATE 50 MCG: 50 INJECTION INTRAMUSCULAR; INTRAVENOUS at 15:52

## 2023-01-03 RX ADMIN — PROPOFOL 200 MG: 10 INJECTION, EMULSION INTRAVENOUS at 15:48

## 2023-01-03 RX ADMIN — PANTOPRAZOLE SODIUM 20 MG: 20 TABLET, DELAYED RELEASE ORAL at 06:23

## 2023-01-03 RX ADMIN — DEXAMETHASONE SODIUM PHOSPHATE 10 MG: 10 INJECTION, SOLUTION INTRAMUSCULAR; INTRAVENOUS at 16:01

## 2023-01-03 RX ADMIN — OXYCODONE HYDROCHLORIDE 5 MG: 5 TABLET ORAL at 07:50

## 2023-01-03 RX ADMIN — SODIUM CHLORIDE, POTASSIUM CHLORIDE, SODIUM LACTATE AND CALCIUM CHLORIDE 125 ML/HR: 600; 310; 30; 20 INJECTION, SOLUTION INTRAVENOUS at 00:50

## 2023-01-03 RX ADMIN — SODIUM CHLORIDE, SODIUM LACTATE, POTASSIUM CHLORIDE, AND CALCIUM CHLORIDE 225 ML/HR: .6; .31; .03; .02 INJECTION, SOLUTION INTRAVENOUS at 16:51

## 2023-01-03 RX ADMIN — SODIUM CHLORIDE, SODIUM LACTATE, POTASSIUM CHLORIDE, AND CALCIUM CHLORIDE: .6; .31; .03; .02 INJECTION, SOLUTION INTRAVENOUS at 15:47

## 2023-01-03 RX ADMIN — GLYCOPYRROLATE 0.4 MG: 0.2 INJECTION, SOLUTION INTRAMUSCULAR; INTRAVENOUS at 16:13

## 2023-01-03 RX ADMIN — NEOSTIGMINE METHYLSULFATE 3 MG: 1 INJECTION INTRAVENOUS at 16:13

## 2023-01-03 RX ADMIN — ONDANSETRON 4 MG: 2 INJECTION INTRAMUSCULAR; INTRAVENOUS at 16:12

## 2023-01-03 RX ADMIN — ROCURONIUM BROMIDE 30 MG: 10 INJECTION, SOLUTION INTRAVENOUS at 15:48

## 2023-01-03 RX ADMIN — FENTANYL CITRATE 50 MCG: 50 INJECTION INTRAMUSCULAR; INTRAVENOUS at 15:48

## 2023-01-03 RX ADMIN — IOHEXOL 4.5 ML: 300 INJECTION, SOLUTION INTRAVENOUS at 16:12

## 2023-01-03 RX ADMIN — INDOMETHACIN 100 MG: 50 SUPPOSITORY RECTAL at 16:05

## 2023-01-03 RX ADMIN — LIDOCAINE HYDROCHLORIDE 50 MG: 10 INJECTION, SOLUTION EPIDURAL; INFILTRATION; INTRACAUDAL; PERINEURAL at 15:48

## 2023-01-03 RX ADMIN — HEPARIN SODIUM 5000 UNITS: 5000 INJECTION INTRAVENOUS; SUBCUTANEOUS at 21:43

## 2023-01-03 NOTE — ANESTHESIA PREPROCEDURE EVALUATION
Procedure:  ERCP    Relevant Problems   ANESTHESIA (within normal limits)      CARDIO   (+) Migraine      GI/HEPATIC   (+) Esophageal reflux      NEURO/PSYCH   (+) Anxiety state   (+) Migraine      Digestive   (+) Choledocholithiasis        Physical Exam    Airway    Mallampati score: II  TM Distance: >3 FB  Neck ROM: full     Dental   No notable dental hx     Cardiovascular  Rhythm: regular, Rate: normal, Cardiovascular exam normal    Pulmonary  Pulmonary exam normal Breath sounds clear to auscultation,     Other Findings        Anesthesia Plan  ASA Score- 2     Anesthesia Type- general with ASA Monitors  Additional Monitors:   Airway Plan: ETT  Plan Factors-Exercise tolerance (METS): >4 METS  Chart reviewed  Existing labs reviewed  Patient summary reviewed  Patient is not a current smoker  Induction- intravenous  Postoperative Plan- Plan for postoperative opioid use  Planned trial extubation    Informed Consent- Anesthetic plan and risks discussed with patient  I personally reviewed this patient with the CRNA  Discussed and agreed on the Anesthesia Plan with the GO Gore

## 2023-01-03 NOTE — QUICK NOTE
Patient was seen this morning, said she had more abdominal pain overnight, LFTs noted increased this morning, she has been n p o , discussed with Dr Marco A Caceres, will plan for ERCP this afternoon  Continue n p o  status and hold anticoagulation

## 2023-01-03 NOTE — ANESTHESIA POSTPROCEDURE EVALUATION
Post-Op Assessment Note    CV Status:  Stable    Pain management: adequate     Mental Status:  Alert and awake   Hydration Status:  Euvolemic   PONV Controlled:  Controlled   Airway Patency:  Patent      Post Op Vitals Reviewed: Yes      Staff: CRNA         No notable events documented      BP   102/55   Temp      Pulse  55   Resp   12   SpO2   91

## 2023-01-03 NOTE — UTILIZATION REVIEW
Initial Clinical Review    Admission: Date/Time/Statement:   Admission Orders (From admission, onward)     Ordered        01/01/23 2314  Inpatient Admission  Once                      Orders Placed This Encounter   Procedures   • Inpatient Admission     Standing Status:   Standing     Number of Occurrences:   1     Order Specific Question:   Level of Care     Answer:   Med Surg [16]     Order Specific Question:   Estimated length of stay     Answer:   More than 2 Midnights     Order Specific Question:   Certification     Answer:   I certify that inpatient services are medically necessary for this patient for a duration of greater than two midnights  See H&P and MD Progress Notes for additional information about the patient's course of treatment  ED Arrival Information     Expected   -    Arrival   1/1/2023 20:27    Acuity   Urgent            Means of arrival   Walk-In    Escorted by   Family Member    Service   Surgery-General    Admission type   Emergency            Arrival complaint   Back pain           Chief Complaint   Patient presents with   • Abdominal Pain     Pt seen yesterday for abdominal/back pain  Told that it could possibly be gallbladder related and to comeback if pain gets worse  Patient states pain is worse after eating  +Nausea       Initial Presentation: 38 y o  female with hx GERD, migraines who presents to ED from home with worsening abdominal pain   Pt seen in ED 12/31/22 with epigastric pain radiating to her back with CTA and labs demonstrated no abnormalities at that time   On exam today , pain is worse in her back , is nauseated, has orange colored urine   Abdomen soft non distended   Labs - elevated LFT's   T bili 3 64  Imaging shows choledocholithiasis   Pt given IVF, IV analgesic, IV PPI in ED  Pt admitted as Inpatient by general sx service with choledocholithiasis  PLan - NPO< IVF, trend LFT's , pain and nausea control, GI consult for ERCP   After duct clearance will require laparoscopic cholecystectomy this admission  DVT ppx   Date: 1/2   Day 2:   Pt w/o N/V  Abdominal pain improving  Pt receiving prn po and prn IV narcotic analgesics for pain 5-9/10 today per nursing notes   Plan - MRCP this am- if positive will require ERCP, if neg will plan for lap choler later today  Continue IVF, NPO, pain control   Await GI consult   Monitoring LFT's, T bili down to 1 70 today   GI consult- epigastric pain with radiation to the back  elevated liver function tests normal lipase and stones of the common bile duct, ultrasound showed gallbladder stones and question 5 mm hyperechoic structure in the distal CBD  Messi Guzman Awaiting MRI MRCP  Will likely need cholecystectomy      ED Triage Vitals   Temperature Pulse Respirations Blood Pressure SpO2   01/01/23 2057 01/01/23 2057 01/01/23 2057 01/01/23 2057 01/01/23 2057   97 5 °F (36 4 °C) 55 16 109/52 99 %      Temp Source Heart Rate Source Patient Position - Orthostatic VS BP Location FiO2 (%)   01/01/23 2057 01/01/23 2057 01/01/23 2057 01/01/23 2057 --   Oral Monitor Sitting Right arm       Pain Score       01/02/23 0219       9          Wt Readings from Last 1 Encounters:   01/02/23 80 8 kg (178 lb 2 1 oz)     Additional Vital Signs:   Date/Time Temp Pulse Resp BP MAP (mmHg) SpO2   01/02/23 1535 98 °F (36 7 °C) 79 18 117/52 74 97 %   01/02/23 07:28:46 97 9 °F (36 6 °C) 55 19 107/56 73 97 %   01/02/23 01:40:09 98 4 °F (36 9 °C) 50 Abnormal  16 105/49 Abnormal  68 96 %   01/02/23 0030 -- 45 Abnormal  16 120/51 73 97 %   01/02/23 0000 -- 52 Abnormal  16 134/88 106 100 %   01/01/23 2330 -- 54 Abnormal  16 117/56 78 99 %   01/01/23 2300 -- 58 16 121/62 86 100 %   01/01/23 2100 -- 54 Abnormal  16 109/52 75 98 %     Pertinent Labs/Diagnostic Test Results:   MRI abdomen wo contrast and mrcp   Final Result by [place] MD (01/02 0301)      Somewhat distended gallbladder with gallbladder wall at the upper limits of normal in thickness, containing small gallstones  Consider HIDA scan to better evaluate for acute cholecystitis  Mild intrahepatic biliary ductal dilatation  Mild dilation of CBD up to 7 mm, CHD up to 6 mm in cystic duct up to 6 mm  CBD somewhat blunted at the level of the ampulla of Vater, however no discrete filling defect is seen in this location  This may be related to spasm of the sphincter of Oddi  Alternatively a tiny calculus (2 mm or less) may be present and not    well-visualized due to slice thickness  No evidence of choledocholithiasis elsewhere  No biliary stricture  Hepatomegaly  Normal liver morphology  No evidence of hepatic steatosis  Workstation performed: [id number]         US right upper quadrant   Final Result by Jose Maria Schulte MD (01/01 2211)      Cholelithiasis  There is mild and extrahepatic biliary duct dilatation  There is a 5 mm shadowing hyperechoic structure at the distal common bile duct at the level of pancreatic head compatible with choledocholithiasis  The study was marked in [facility] for immediate notification  Workstation performed: [id number]         XR chest 1 view portable   Final Result by Chetna Bennett MD (01/02 3881)      No acute cardiopulmonary disease  Findings are stable            Workstation performed: [id number]         12/31 CT A chest, CT A/P- previous ED visit-      No aortic dissection  No acute findings in the chest, abdomen or pelvis    Results from last 7 days   Lab Units 01/02/23  0453 01/01/23 2207 12/31/22  0639   WBC Thousand/uL 5 61 6 61 8 61   HEMOGLOBIN g/dL 11 8 11 9 12 0   HEMATOCRIT % 34 3* 35 9 35 1   PLATELETS Thousands/uL 211 215 234   NEUTROS ABS Thousands/µL  --  4 00 4 23         Results from last 7 days   Lab Units 01/03/23  0556 01/02/23  0453 01/01/23 2207 12/31/22  0639   SODIUM mmol/L 137 137 138 137   POTASSIUM mmol/L 4 2 4 1 3 8 3 4*   CHLORIDE mmol/L 105 107 104 106   CO2 mmol/L 28 24 26 20*   ANION GAP mmol/L 4 6 8 11 BUN mg/dL 10 12 15 18   CREATININE mg/dL 0 78 0 72 0 73 0 73   EGFR ml/min/1 73sq m 95 105 103 103   CALCIUM mg/dL 9 0 9 0 9 6 9 4     Results from last 7 days   Lab Units 01/03/23  0556 01/02/23  0453 01/01/23 2207 12/31/22  0639   AST U/L 263* 352* 409* 16   ALT U/L 409* 465* 473* 9   ALK PHOS U/L 240* 197* 199* 70   TOTAL PROTEIN g/dL 6 5 6 4 7 1 6 8   ALBUMIN g/dL 3 9 4 1 4 3 4 2   TOTAL BILIRUBIN mg/dL 3 09* 1 70* 3 64* 0 56   BILIRUBIN DIRECT mg/dL 1 73*  --   --   --          Results from last 7 days   Lab Units 01/03/23  0556 01/02/23 0453 01/01/23 2207 12/31/22  0639   GLUCOSE RANDOM mg/dL 95 87 100 106               Results from last 7 days   Lab Units 12/31/22  0845 12/31/22  0639   HS TNI 0HR ng/L  --  <2   HS TNI 2HR ng/L 2  --    HSTNI D2 ng/L >0  --          Results from last 7 days   Lab Units 01/03/23  0556   PROTIME seconds 12 7   INR  0 93   PTT seconds 26                   Results from last 7 days   Lab Units 01/01/23 2207 12/31/22  0718   LIPASE u/L 84* 54                 Results from last 7 days   Lab Units 12/31/22  0815   CLARITY UA  Turbid   COLOR UA  Light Yellow   SPEC GRAV UA  1 012   PH UA  7 5   GLUCOSE UA mg/dl Negative   KETONES UA mg/dl Negative   BLOOD UA  Negative   PROTEIN UA mg/dl Negative   NITRITE UA  Negative   BILIRUBIN UA  Negative   UROBILINOGEN UA (BE) mg/dl <2 0   LEUKOCYTES UA  Large*   WBC UA /hpf 4-10*   RBC UA /hpf 1-2   BACTERIA UA /hpf Occasional   EPITHELIAL CELLS WET PREP /hpf Occasional                     ED Treatment:   Medication Administration from 01/01/2023 2027 to 01/02/2023 0110       Date/Time Order Dose Route Action     01/01/2023 2210 EST pantoprazole (PROTONIX) injection 40 mg 40 mg Intravenous Given     01/01/2023 2210 EST morphine injection 4 mg 4 mg Intravenous Given     01/02/2023 0039 EST lactated ringers infusion 125 mL/hr Intravenous New Bag        Past Medical History:   Diagnosis Date   • Abnormal Pap smear of cervix    • Deviated septum    • Ear problems    • GERD (gastroesophageal reflux disease)    • Heartburn during pregnancy    • Migraine    • Palpitations    • Varicella      Present on Admission:  **None**      Admitting Diagnosis: Choledocholithiasis [K80 50]  Back pain [M54 9]  Age/Sex: 38 y o  female  Admission Orders:  Scheduled Medications:  heparin (porcine), 5,000 Units, Subcutaneous, Q8H LOVE  pantoprazole, 20 mg, Oral, Early Morning      Continuous IV Infusions:  lactated ringers, 125 mL/hr, Intravenous, Continuous      PRN Meds:  acetaminophen, 650 mg, Oral, Q6H PRN  HYDROmorphone, 0 5 mg, Intravenous, Q3H PRN x3 1/2  ondansetron, 4 mg, Intravenous, Q6H PRN  oxyCODONE, 10 mg, Oral, Q4H PRN x2 1/2  oxyCODONE, 5 mg, Oral, Q4H PRN    NPO 1/3/23 @0001    IP CONSULT TO GASTROENTEROLOGY    Network Utilization Review Department  ATTENTION: Please call with any questions or concerns to [phone] and carefully listen to the prompts so that you are directed to the right person  All voicemails are confidential   Radha Soriano all requests for admission clinical reviews, approved or denied determinations and any other requests to dedicated fax number below belonging to the campus where the patient is receiving treatment   List of dedicated fax numbers for the Facilities:  [address] DENIALS (Administrative/Medical Necessity) [phone]   [address] (Maternity/NICU/Pediatrics) [phone]   [address] [phone]   [facility] [phone]   [facility] [phone]   [address] [address] [facility] [place] [place]     [facility] [address] [phone]

## 2023-01-04 ENCOUNTER — ANESTHESIA (INPATIENT)
Dept: PERIOP | Facility: HOSPITAL | Age: 43
End: 2023-01-04

## 2023-01-04 VITALS
WEIGHT: 178.13 LBS | SYSTOLIC BLOOD PRESSURE: 98 MMHG | TEMPERATURE: 98.2 F | DIASTOLIC BLOOD PRESSURE: 47 MMHG | RESPIRATION RATE: 16 BRPM | OXYGEN SATURATION: 94 % | BODY MASS INDEX: 27 KG/M2 | HEIGHT: 68 IN | HEART RATE: 63 BPM

## 2023-01-04 LAB
ALBUMIN SERPL BCP-MCNC: 4.3 G/DL (ref 3.5–5)
ALP SERPL-CCNC: 240 U/L (ref 34–104)
ALT SERPL W P-5'-P-CCNC: 351 U/L (ref 7–52)
ANION GAP SERPL CALCULATED.3IONS-SCNC: 8 MMOL/L (ref 4–13)
AST SERPL W P-5'-P-CCNC: 114 U/L (ref 13–39)
BILIRUB SERPL-MCNC: 1.4 MG/DL (ref 0.2–1)
BUN SERPL-MCNC: 11 MG/DL (ref 5–25)
CALCIUM SERPL-MCNC: 9.3 MG/DL (ref 8.4–10.2)
CHLORIDE SERPL-SCNC: 105 MMOL/L (ref 96–108)
CO2 SERPL-SCNC: 22 MMOL/L (ref 21–32)
CREAT SERPL-MCNC: 0.65 MG/DL (ref 0.6–1.3)
GFR SERPL CREATININE-BSD FRML MDRD: 111 ML/MIN/1.73SQ M
GLUCOSE SERPL-MCNC: 119 MG/DL (ref 65–140)
POTASSIUM SERPL-SCNC: 4.3 MMOL/L (ref 3.5–5.3)
PROT SERPL-MCNC: 7 G/DL (ref 6.4–8.4)
SODIUM SERPL-SCNC: 135 MMOL/L (ref 135–147)

## 2023-01-04 PROCEDURE — 0FT44ZZ RESECTION OF GALLBLADDER, PERCUTANEOUS ENDOSCOPIC APPROACH: ICD-10-PCS | Performed by: STUDENT IN AN ORGANIZED HEALTH CARE EDUCATION/TRAINING PROGRAM

## 2023-01-04 RX ORDER — ACETAMINOPHEN 325 MG/1
650 TABLET ORAL EVERY 6 HOURS SCHEDULED
Status: DISCONTINUED | OUTPATIENT
Start: 2023-01-04 | End: 2023-01-04 | Stop reason: HOSPADM

## 2023-01-04 RX ORDER — METHOCARBAMOL 500 MG/1
500 TABLET, FILM COATED ORAL EVERY 6 HOURS PRN
Qty: 56 TABLET | Refills: 0 | Status: SHIPPED | OUTPATIENT
Start: 2023-01-04 | End: 2023-01-18

## 2023-01-04 RX ORDER — HYDROMORPHONE HCL IN WATER/PF 6 MG/30 ML
0.2 PATIENT CONTROLLED ANALGESIA SYRINGE INTRAVENOUS
Status: DISCONTINUED | OUTPATIENT
Start: 2023-01-04 | End: 2023-01-04 | Stop reason: HOSPADM

## 2023-01-04 RX ORDER — MIDAZOLAM HYDROCHLORIDE 2 MG/2ML
INJECTION, SOLUTION INTRAMUSCULAR; INTRAVENOUS AS NEEDED
Status: DISCONTINUED | OUTPATIENT
Start: 2023-01-04 | End: 2023-01-04

## 2023-01-04 RX ORDER — DEXAMETHASONE SODIUM PHOSPHATE 10 MG/ML
INJECTION, SOLUTION INTRAMUSCULAR; INTRAVENOUS AS NEEDED
Status: DISCONTINUED | OUTPATIENT
Start: 2023-01-04 | End: 2023-01-04

## 2023-01-04 RX ORDER — ONDANSETRON 2 MG/ML
4 INJECTION INTRAMUSCULAR; INTRAVENOUS ONCE AS NEEDED
Status: DISCONTINUED | OUTPATIENT
Start: 2023-01-04 | End: 2023-01-04 | Stop reason: HOSPADM

## 2023-01-04 RX ORDER — GABAPENTIN 100 MG/1
100 CAPSULE ORAL 3 TIMES DAILY
Status: DISCONTINUED | OUTPATIENT
Start: 2023-01-04 | End: 2023-01-04 | Stop reason: HOSPADM

## 2023-01-04 RX ORDER — GUAIFENESIN 1200 MG
325 TABLET, EXTENDED RELEASE 12 HR ORAL EVERY 6 HOURS
Qty: 56 CAPSULE | Refills: 0 | Status: SHIPPED | OUTPATIENT
Start: 2023-01-05 | End: 2023-01-19

## 2023-01-04 RX ORDER — METHOCARBAMOL 500 MG/1
500 TABLET, FILM COATED ORAL EVERY 6 HOURS PRN
Status: DISCONTINUED | OUTPATIENT
Start: 2023-01-04 | End: 2023-01-04 | Stop reason: HOSPADM

## 2023-01-04 RX ORDER — ONDANSETRON 2 MG/ML
INJECTION INTRAMUSCULAR; INTRAVENOUS AS NEEDED
Status: DISCONTINUED | OUTPATIENT
Start: 2023-01-04 | End: 2023-01-04

## 2023-01-04 RX ORDER — MAGNESIUM HYDROXIDE 1200 MG/15ML
LIQUID ORAL AS NEEDED
Status: DISCONTINUED | OUTPATIENT
Start: 2023-01-04 | End: 2023-01-04 | Stop reason: HOSPADM

## 2023-01-04 RX ORDER — GLYCOPYRROLATE 0.2 MG/ML
INJECTION INTRAMUSCULAR; INTRAVENOUS AS NEEDED
Status: DISCONTINUED | OUTPATIENT
Start: 2023-01-04 | End: 2023-01-04

## 2023-01-04 RX ORDER — PROPOFOL 10 MG/ML
INJECTION, EMULSION INTRAVENOUS AS NEEDED
Status: DISCONTINUED | OUTPATIENT
Start: 2023-01-04 | End: 2023-01-04

## 2023-01-04 RX ORDER — ROCURONIUM BROMIDE 10 MG/ML
INJECTION, SOLUTION INTRAVENOUS AS NEEDED
Status: DISCONTINUED | OUTPATIENT
Start: 2023-01-04 | End: 2023-01-04

## 2023-01-04 RX ORDER — OXYCODONE HYDROCHLORIDE 5 MG/1
5 TABLET ORAL EVERY 6 HOURS PRN
Qty: 15 TABLET | Refills: 0 | Status: SHIPPED | OUTPATIENT
Start: 2023-01-04 | End: 2023-01-05

## 2023-01-04 RX ORDER — LIDOCAINE HYDROCHLORIDE 10 MG/ML
INJECTION, SOLUTION EPIDURAL; INFILTRATION; INTRACAUDAL; PERINEURAL AS NEEDED
Status: DISCONTINUED | OUTPATIENT
Start: 2023-01-04 | End: 2023-01-04

## 2023-01-04 RX ORDER — FENTANYL CITRATE 50 UG/ML
INJECTION, SOLUTION INTRAMUSCULAR; INTRAVENOUS AS NEEDED
Status: DISCONTINUED | OUTPATIENT
Start: 2023-01-04 | End: 2023-01-04

## 2023-01-04 RX ORDER — KETOROLAC TROMETHAMINE 30 MG/ML
INJECTION, SOLUTION INTRAMUSCULAR; INTRAVENOUS AS NEEDED
Status: DISCONTINUED | OUTPATIENT
Start: 2023-01-04 | End: 2023-01-04

## 2023-01-04 RX ORDER — FENTANYL CITRATE/PF 50 MCG/ML
25 SYRINGE (ML) INJECTION
Status: COMPLETED | OUTPATIENT
Start: 2023-01-04 | End: 2023-01-04

## 2023-01-04 RX ADMIN — OXYCODONE HYDROCHLORIDE 10 MG: 10 TABLET ORAL at 11:38

## 2023-01-04 RX ADMIN — GLYCOPYRROLATE 0.2 MG: 0.2 INJECTION, SOLUTION INTRAMUSCULAR; INTRAVENOUS at 09:07

## 2023-01-04 RX ADMIN — GLYCOPYRROLATE 0.2 MG: 0.2 INJECTION, SOLUTION INTRAMUSCULAR; INTRAVENOUS at 09:31

## 2023-01-04 RX ADMIN — DEXAMETHASONE SODIUM PHOSPHATE 10 MG: 10 INJECTION, SOLUTION INTRAMUSCULAR; INTRAVENOUS at 09:15

## 2023-01-04 RX ADMIN — HYDROMORPHONE HYDROCHLORIDE 0.25 MG: 1 INJECTION, SOLUTION INTRAMUSCULAR; INTRAVENOUS; SUBCUTANEOUS at 09:33

## 2023-01-04 RX ADMIN — FENTANYL CITRATE 25 MCG: 50 INJECTION INTRAMUSCULAR; INTRAVENOUS at 10:39

## 2023-01-04 RX ADMIN — FENTANYL CITRATE 100 MCG: 50 INJECTION, SOLUTION INTRAMUSCULAR; INTRAVENOUS at 09:15

## 2023-01-04 RX ADMIN — METRONIDAZOLE: 500 INJECTION, SOLUTION INTRAVENOUS at 09:29

## 2023-01-04 RX ADMIN — GABAPENTIN 100 MG: 100 CAPSULE ORAL at 16:49

## 2023-01-04 RX ADMIN — FENTANYL CITRATE 25 MCG: 50 INJECTION INTRAMUSCULAR; INTRAVENOUS at 10:42

## 2023-01-04 RX ADMIN — ROCURONIUM BROMIDE 50 MG: 10 SOLUTION INTRAVENOUS at 09:15

## 2023-01-04 RX ADMIN — FENTANYL CITRATE 25 MCG: 50 INJECTION INTRAMUSCULAR; INTRAVENOUS at 10:49

## 2023-01-04 RX ADMIN — METHOCARBAMOL 500 MG: 500 TABLET ORAL at 16:49

## 2023-01-04 RX ADMIN — SUGAMMADEX 200 MG: 100 INJECTION, SOLUTION INTRAVENOUS at 10:18

## 2023-01-04 RX ADMIN — HYDROMORPHONE HYDROCHLORIDE 0.25 MG: 1 INJECTION, SOLUTION INTRAMUSCULAR; INTRAVENOUS; SUBCUTANEOUS at 09:45

## 2023-01-04 RX ADMIN — CEFAZOLIN SODIUM 2000 MG: 2 SOLUTION INTRAVENOUS at 09:20

## 2023-01-04 RX ADMIN — PROPOFOL 30 MG: 10 INJECTION, EMULSION INTRAVENOUS at 10:10

## 2023-01-04 RX ADMIN — HEPARIN SODIUM 5000 UNITS: 5000 INJECTION INTRAVENOUS; SUBCUTANEOUS at 13:25

## 2023-01-04 RX ADMIN — PROPOFOL 200 MG: 10 INJECTION, EMULSION INTRAVENOUS at 09:15

## 2023-01-04 RX ADMIN — HYDROMORPHONE HYDROCHLORIDE 0.2 MG: 0.2 INJECTION, SOLUTION INTRAMUSCULAR; INTRAVENOUS; SUBCUTANEOUS at 10:53

## 2023-01-04 RX ADMIN — KETOROLAC TROMETHAMINE 30 MG: 30 INJECTION, SOLUTION INTRAMUSCULAR; INTRAVENOUS at 10:14

## 2023-01-04 RX ADMIN — ACETAMINOPHEN 650 MG: 325 TABLET, FILM COATED ORAL at 16:50

## 2023-01-04 RX ADMIN — OXYCODONE HYDROCHLORIDE 5 MG: 5 TABLET ORAL at 16:49

## 2023-01-04 RX ADMIN — PROPOFOL 30 MG: 10 INJECTION, EMULSION INTRAVENOUS at 10:06

## 2023-01-04 RX ADMIN — PROPOFOL 30 MG: 10 INJECTION, EMULSION INTRAVENOUS at 10:15

## 2023-01-04 RX ADMIN — LIDOCAINE HYDROCHLORIDE 50 MG: 10 INJECTION, SOLUTION EPIDURAL; INFILTRATION; INTRACAUDAL at 09:15

## 2023-01-04 RX ADMIN — PROPOFOL 40 MG: 10 INJECTION, EMULSION INTRAVENOUS at 10:03

## 2023-01-04 RX ADMIN — HYDROMORPHONE HYDROCHLORIDE 0.5 MG: 1 INJECTION, SOLUTION INTRAMUSCULAR; INTRAVENOUS; SUBCUTANEOUS at 13:25

## 2023-01-04 RX ADMIN — PANTOPRAZOLE SODIUM 20 MG: 20 TABLET, DELAYED RELEASE ORAL at 04:35

## 2023-01-04 RX ADMIN — FENTANYL CITRATE 25 MCG: 50 INJECTION INTRAMUSCULAR; INTRAVENOUS at 10:46

## 2023-01-04 RX ADMIN — ONDANSETRON 4 MG: 2 INJECTION INTRAMUSCULAR; INTRAVENOUS at 10:01

## 2023-01-04 RX ADMIN — MIDAZOLAM HYDROCHLORIDE 2 MG: 1 INJECTION, SOLUTION INTRAMUSCULAR; INTRAVENOUS at 09:07

## 2023-01-04 NOTE — OP NOTE
OPERATIVE REPORT  PATIENT NAME: Tena Perez    :  1980  MRN: [id number]  Pt Location: AN OR ROOM 03    SURGERY DATE: 2023    Surgeon(s) and Role:     Lauro Singh,  - Primary     * Errol Martin DO - Assisting    Preop Diagnosis:  Calculus of gallbladder with biliary obstruction but without cholecystitis [K80 21]    Post-Op Diagnosis Codes:     * Calculus of gallbladder with biliary obstruction but without cholecystitis [K80 21]    Procedure(s) (LRB):  CHOLECYSTECTOMY LAPAROSCOPIC (N/A)    Specimen(s):  ID Type Source Tests Collected by Time Destination   1 :  Tissue Gallbladder TISSUE EXAM Monica Arnold DO 2023 0936        Estimated Blood Loss:   Minimal    Drains:  * No LDAs found *    Anesthesia Type:   Choice    Operative Indications:  Calculus of gallbladder with biliary obstruction but without cholecystitis [K80 21]      Operative Findings:  Cholelithiasis, no evidence of cholecystitis    Complications:   None    Procedure and Technique:    The patient was met and identified in the preop holding area  She was brought to the operating room and placed supine on table and prepped and draped in usual sterile manner  Preoperative abx were administered, and general endotracheal anesthesia was induced  Timeout performed and all elements of timeout reviewed and confirmed  Laparoscopic equipment was checked and deemed adequate  An infraumbilical incision was made and a Veress needle us utilized through the umbilicus to induce pneumoperitoneum  Abdomen was then insufflated to 15 mm HG pressure which was tolerated well  A 5mm trocar was then inserted utilizing the Optiview technique  Direct inspection revealed no damage to underlying viscera  A 12mm subxiphoid and 5mm right sided ports were then placed under direct visualization and the gallbladder was retracted cephalad and laterally   The cystic duct and cystic artery were carefully skeletonized until a critical view could be accomplished  Once this was done these structures were sequentially clipped and divided  The gallbladder was removed from the gallbladder fossa with the laparoscopic hook and bovie cautery  It was placed in an Endocatch bag and removed through the subxiphoid port site without difficulty  The RUQ was irrigated with warm normal saline until the return fluid was clear of blood and bile  The ports were then removed under direct visualization without difficulty  The subxiphoid port site was closed with 1 interrupted 0-vicryl suture utilizing the laparoscopic suture passer  Port sites were closed with subcuticular 4-0 Monocryl suture  Instrument, needle, and sponge counts were correct and confirmed by RF yoko  Patient was extubated and brought to PACU in stable condition  Dr Eleanor Garcia was present for the entire procedure             I was present for the entire procedure    Patient Disposition:  PACU         SIGNATURE: Jorge Partida DO  DATE: January 4, 2023  TIME: 10:33 AM

## 2023-01-04 NOTE — INCIDENTAL FINDINGS
The following findings require follow up:  Radiographic finding   Finding: Cholelithiasis  There is mild and extrahepatic biliary duct dilatation  There is a 5 mm shadowing hyperechoic structure at the distal common bile duct at the level of pancreatic head compatible with choledocholithiasis      The study was marked in EPIC for immediate notification  Follow up required: ERCP and laparoscopic cholecystectomy completed while inpatient  Will complete post-surgical follow-up  Follow up should be done within 2 week(s)    Please notify the following clinician to assist with the follow up:   Dr Zackery Bland and Dr Ramos Sharp    The findings were discussed with the patient during her admission  The patient agreed to ERCP which was followed by laparoscopic cholecystectomy  The patient was consented for both procedures  Both procedures were completed during her admission  Patient will follow-up in 2 weeks for postsurgical assessment  The patient agreed and verbalized her understanding

## 2023-01-04 NOTE — ANESTHESIA POSTPROCEDURE EVALUATION
Post-Op Assessment Note    CV Status:  Stable  Pain Score: 0    Pain management: adequate     Mental Status:  Sleepy and arousable   Hydration Status:  Euvolemic   PONV Controlled:  Controlled   Airway Patency:  Patent      Post Op Vitals Reviewed: Yes      Staff: CRNA, Anesthesiologist         No notable events documented      BP   118/60   Temp   96 9   Pulse  76   Resp   12   SpO2   100

## 2023-01-04 NOTE — QUICK NOTE
Attempted to call  Jake Navarrete to update him regarding patient's surgery  No answer       Areli Ingram, DO

## 2023-01-04 NOTE — ANESTHESIA PREPROCEDURE EVALUATION
Procedure:  CHOLECYSTECTOMY LAPAROSCOPIC (Abdomen)    Relevant Problems   ANESTHESIA (within normal limits)      CARDIO (within normal limits)   (+) Migraine      ENDO (within normal limits)      GI/HEPATIC   (+) Esophageal reflux      /RENAL (within normal limits)      HEMATOLOGY (within normal limits)      NEURO/PSYCH   (+) Anxiety state   (+) Migraine      PULMONARY (within normal limits)      Digestive   (+) Choledocholithiasis      EKG 12/31/2022:  Normal sinus rhythm with sinus arrhythmia  Rightward axis  Incomplete right bundle branch block  Borderline ECG  No previous ECGs available    Lab Results   Component Value Date    WBC 5 61 01/02/2023    HGB 11 8 01/02/2023    HCT 34 3 (L) 01/02/2023    MCV 94 01/02/2023     01/02/2023     Lab Results   Component Value Date    SODIUM 137 01/03/2023    K 4 2 01/03/2023     01/03/2023    CO2 28 01/03/2023    BUN 10 01/03/2023    CREATININE 0 78 01/03/2023    GLUC 95 01/03/2023    CALCIUM 9 0 01/03/2023     Lab Results   Component Value Date    INR 0 93 01/03/2023    PROTIME 12 7 01/03/2023     No results found for: HGBA1C       Physical Exam    Airway    Mallampati score: II  TM Distance: >3 FB  Neck ROM: full     Dental   No notable dental hx     Cardiovascular  Cardiovascular exam normal    Pulmonary  Pulmonary exam normal     Other Findings        Anesthesia Plan  ASA Score- 2     Anesthesia Type- general with ASA Monitors  Additional Monitors:   Airway Plan: ETT  Plan Factors-Exercise tolerance (METS): >4 METS  Chart reviewed  EKG reviewed  Existing labs reviewed  Patient summary reviewed  Induction- intravenous  Postoperative Plan-     Informed Consent- Anesthetic plan and risks discussed with patient  I personally reviewed this patient with the CRNA  Discussed and agreed on the Anesthesia Plan with the CRNA  Brandon Bowman

## 2023-01-04 NOTE — PROGRESS NOTES
Progress Note - General Surgery   LO Resident on Surgery Service   Rafaela Ortiz 38 y o  female MRN: [id number]  Unit/Bed#: S -01 Encounter: [id number]    Assessment:  42F w/ choledocholithiasis, s/p ERCP on 1/3  Plan to proceed to OR today for lap choley  Afebrile  Bradycardic to low 40's  UOP 1x in chart (3x per patient)  Am labs pending, will f/u AM labs    Plan:  -NPO  -IV fluids  -Pre-op abx  -PRN Pain meds  -PRN anti-nausea  -DVT ppx  -Plan to proceed to OR today for lap choley    Subjective/Objective     Subjective: Patient completed ERCP yesterday  Sylvie Mathew was extracted  Overnight, no acute events  Patient no longer has back pain  Endorses more of a burning pain in her lower abdomen  Patient attributes this to possible menstrual cramps  Patient having nausea, vomiting, SOB, fevers, chills  States pain is well controlled  Not having bowel movements but is passing flatus  Currently NPO for lap choley today  Objective:     Blood pressure 109/61, pulse (!) 44, temperature 98 3 °F (36 8 °C), temperature source Oral, resp  rate 17, height 5' 8" (1 727 m), weight 80 8 kg (178 lb 2 1 oz), last menstrual period 12/08/2022, SpO2 96 %, not currently breastfeeding  ,Body mass index is 27 08 kg/m²  Intake/Output Summary (Last 24 hours) at 1/4/2023 0552  Last data filed at 1/3/2023 1631  Gross per 24 hour   Intake 300 ml   Output --   Net 300 ml       Invasive Devices     Peripheral Intravenous Line  Duration           Peripheral IV 01/01/23 Dorsal (posterior); Right Forearm 2 days                General: NAD  HENT: NCAT MMM  Neck: supple, no JVD  CV: nl rate  Lungs: nl wob   No resp distress  ABD: Soft, TTP RLQ and LLQ, nondistended  Extrem: No CCE  Neuro: AAOx3       Scheduled Meds:  Current Facility-Administered Medications   Medication Dose Route Frequency Provider Last Rate   • acetaminophen  650 mg Oral Q6H PRN Amy Morgan MD     • albuterol  2 5 mg Nebulization Once PRN Rubi Rashid MD Lefty     • cefazolin  2,000 mg Intravenous On Call To OR Sherie Domingo MD     • fentaNYL  25 mcg Intravenous Q5 Min PRN Radha Guillen MD     • heparin (porcine)  5,000 Units Subcutaneous Q8H [address] Sherie Domingo MD     • hydrALAZINE  5 mg Intravenous Q20 Min PRN Radha Guillen MD     • HYDROmorphone  0 5 mg Intravenous Q3H PRN Sherie Domingo MD     • HYDROmorphone  0 5 mg Intravenous Q5 Min PRN Radha Guillen MD     • HYDROmorphone  0 2 mg Intravenous Q5 Min PRN Radha Guillen MD     • ipratropium  0 5 mg Nebulization Once PRN Radha Guillen MD     • labetalol  10 mg Intravenous Q10 Min PRN Radha Guillen MD     • lactated ringers  125 mL/hr Intravenous Continuous Jennifer Mota  mL/hr (01/03/23 1752)   • metoclopramide  10 mg Intravenous Once PRN Radha Guillen MD     • metroNIDAZOLE  500 mg Intravenous On Call To OR Sherie Domingo MD     • ondansetron  4 mg Intravenous Q6H PRN Sherie Domingo MD     • ondansetron  4 mg Intravenous Once PRN Radha Guillen MD     • oxyCODONE  10 mg Oral Q4H PRN Sherie Domingo MD     • oxyCODONE  5 mg Oral Q4H PRN Sherie Domingo MD     • pantoprazole  20 mg Oral Early Morning Sherie Domingo MD     • promethazine  12 5 mg Intravenous Once PRN Radha Guillen MD       Continuous Infusions:lactated ringers, 125 mL/hr, Last Rate: 125 mL/hr (01/03/23 1752)      PRN Meds: •  acetaminophen  •  albuterol  •  fentaNYL  •  hydrALAZINE  •  HYDROmorphone  •  HYDROmorphone  •  HYDROmorphone  •  ipratropium  •  labetalol  •  metoclopramide  •  ondansetron  •  ondansetron  •  oxyCODONE  •  oxyCODONE  •  promethazine      Lab, Imaging and other studies:  I have personally reviewed pertinent lab results    , CBC: No results found for: WBC, HGB, HCT, MCV, PLT, ADJUSTEDWBC, MCH, MCHC, RDW, MPV, NRBC, CMP:   Lab Results   Component Value Date    SODIUM 137 01/03/2023    K 4 2 01/03/2023     01/03/2023    CO2 28 01/03/2023    BUN 10 01/03/2023    CREATININE 0 78 01/03/2023 CALCIUM 9 0 01/03/2023     (H) 01/03/2023     (H) 01/03/2023    ALKPHOS 240 (H) 01/03/2023    EGFR 95 01/03/2023   , Coagulation:   Lab Results   Component Value Date    INR 0 93 01/03/2023   , Urinalysis: No results found for: Pooja Moulder, SPECGRAV, PHUR, LEUKOCYTESUR, NITRITE, PROTEINUA, GLUCOSEU, KETONESU, BILIRUBINUR, BLOODU, Amylase: No results found for: AMYLASE, Lipase:   Lab Results   Component Value Date    LIPASE 65 01/03/2023     VTE Pharmacologic Prophylaxis: Heparin  VTE Mechanical Prophylaxis: sequential compression device      Chi Mckeon MD  1/4/2023 5:52 AM

## 2023-01-04 NOTE — DISCHARGE SUMMARY
Discharge Summary - Nisreen Torres 38 y o  female MRN: [id number]    Unit/Bed#: S -93 Encounter: [id number]    Admission Date:   Admission Orders (From admission, onward)     Ordered        01/01/23 2314  Inpatient Admission  Once                        Admitting Diagnosis: Choledocholithiasis [K80 50]  Back pain [M54 9]    HPI: Nisreen Torres is a 38 y o  female who presents with epigastric pain that radiates to her back that originally started yesterday  She reports pain began yesterday where it woke her up out of sleep  She was seen in the ED where CTA and labs demonstrated no abnormalities  Pain worsened throughout the day which prompted her to return to the hospital  Pain is worse in her back which she describes as throbbing  Pain relieved by pain medications  She denies any fevers or chills  Endorses orange colored urine  Reports nausea, no vomiting  Has never had this pain previously  No AC/AP  Only abd surgery is laparoscopic salpingectomy  Procedures Performed: No orders of the defined types were placed in this encounter  Summary of Hospital Course: Patient was admitted for further management of her choledocholithiasis  On 1/4, the patient underwent an ERCP for which the common bile duct was cannulated using sphincterotome with guidewire  Bile duct measured 7 mm on cholangiogram   Small filling defect was appreciated  Sphincterotomy was performed and a stent was extracted  Following the ERCP, the patient underwent a laparoscopic cholecystectomy on 1/5  The patient tolerated the procedure well and there were no immediate complications intra-op or postoperatively  Patient was discharged in a hemodynamically stable condition, tolerating diet, she was urinating without difficulty, she was out of bed and ambulating without difficulty  The patient was informed about her incidental findings during the admission for which she received an ERCP and laparoscopic cholecystectomy    Plan for postoperative follow-up in 2 weeks  Significant Findings, Care, Treatment and Services Provided: General surgery, GI    Complications: None    Discharge Diagnosis: Choledocholithiasis    Medical Problems     Resolved Problems  Date Reviewed: 7/19/2022   None         Condition at Discharge: good         Discharge instructions/Information to patient and family:   See after visit summary for information provided to patient and family  Provisions for Follow-Up Care:  See after visit summary for information related to follow-up care and any pertinent home health orders  PCP: Adrianna Yang DO    Disposition: See After Visit Summary for discharge disposition information  Planned Readmission: No      Discharge Statement   I spent 30 minutes discharging the patient  This time was spent on the day of discharge  I had direct contact with the patient on the day of discharge  Additional documentation is required if more than 30 minutes were spent on discharge  Discharge Medications:  See after visit summary for reconciled discharge medications provided to patient and family  31.3

## 2023-01-04 NOTE — PROGRESS NOTES
Progress Note - Jarod Figueroa 38 y o  female MRN: [id number]    Unit/Bed#: [facility] Encounter: [id number]    Assessment and Plan:   Principal Problem:    Choledocholithiasis    #1  Choledocholithiasis: s/p ERCP yesterday with sphincterotomy and stone extraction, no pain today  Going for lap arleen, LFTs improving  -lap arleen per surgery  -d/c per surgery  -GI will sign off     ----------------------------------------------------------------------------------------------------------------    Subjective:     Patient has no pain, going for lap arleen this AM    Objective:     Vitals: Blood pressure 97/53, pulse 59, temperature 97 7 °F (36 5 °C), temperature source Temporal, resp  rate 16, height 5' 8" (1 727 m), weight 80 8 kg (178 lb 2 1 oz), last menstrual period 12/08/2022, SpO2 98 %, not currently breastfeeding  ,Body mass index is 27 08 kg/m²  Intake/Output Summary (Last 24 hours) at 1/4/2023 2627  Last data filed at 1/3/2023 1631  Gross per 24 hour   Intake 300 ml   Output --   Net 300 ml       Physical Exam:     General Appearance: Alert, appears stated age and cooperative; jaundice and scleral icterus present  Lungs: Clear to auscultation bilaterally, no rales or rhonchi, no labored breathing/accessory muscle use  Heart: Regular rate and rhythm, S1, S2 normal, no murmur, click, rub or gallop  Abdomen: Soft, non-tender, non-distended; bowel sounds normal; no masses or no organomegaly  Extremities: No cyanosis, clubbing, or edema    Invasive Devices     Peripheral Intravenous Line  Duration           Peripheral IV 01/01/23 Dorsal (posterior); Right Forearm 2 days                Lab Results:  Results from last 7 days   Lab Units 01/02/23  0453 01/01/23  2207   WBC Thousand/uL 5 61 6 61   HEMOGLOBIN g/dL 11 8 11 9   HEMATOCRIT % 34 3* 35 9   PLATELETS Thousands/uL 211 215   NEUTROS PCT %  --  61   LYMPHS PCT %  --  29   MONOS PCT %  --  8   EOS PCT %  --  1     Results from last 7 days   Lab Units 01/04/23  0438 POTASSIUM mmol/L 4 3   CHLORIDE mmol/L 105   CO2 mmol/L 22   BUN mg/dL 11   CREATININE mg/dL 0 65   CALCIUM mg/dL 9 3   ALK PHOS U/L 240*   ALT U/L 351*   AST U/L 114*     Invalid input(s): BILI  Results from last 7 days   Lab Units 01/03/23  0556   INR  0 93     Results from last 7 days   Lab Units 01/03/23  0556   LIPASE u/L 65       Imaging Studies: I have personally reviewed pertinent imaging studies  ERCP    Result Date: 1/3/2023  Impression: As above RECOMMENDATION:  Lactated Ringer's at 225 mL/h, clear liquid diet, lap arleen as per surgery  No Staff Documented     XR chest 1 view portable    Result Date: 1/2/2023  Impression: No acute cardiopulmonary disease  Findings are stable Workstation performed: [id number]     MRI abdomen wo contrast and mrcp    Result Date: 1/2/2023  Impression: Somewhat distended gallbladder with gallbladder wall at the upper limits of normal in thickness, containing small gallstones  Consider HIDA scan to better evaluate for acute cholecystitis  Mild intrahepatic biliary ductal dilatation  Mild dilation of CBD up to 7 mm, CHD up to 6 mm in cystic duct up to 6 mm  CBD somewhat blunted at the level of the ampulla of Vater, however no discrete filling defect is seen in this location  This may be related to spasm of the sphincter of Oddi  Alternatively a tiny calculus (2 mm or less) may be present and not well-visualized due to slice thickness  No evidence of choledocholithiasis elsewhere  No biliary stricture  Hepatomegaly  Normal liver morphology  No evidence of hepatic steatosis  Workstation performed: [id number]     FL ERCP biliary only    Result Date: 1/3/2023  Impression: Mild dilatation common bile duct with tiny distal filling defect consistent with calculus status post removal following sphincterotomy and balloon sweeping  Workstation performed: [id number]     US right upper quadrant    Result Date: 1/1/2023  Impression: Cholelithiasis    There is mild and extrahepatic biliary duct dilatation  There is a 5 mm shadowing hyperechoic structure at the distal common bile duct at the level of pancreatic head compatible with choledocholithiasis  The study was marked in [facility] for immediate notification   Workstation performed: [id number]

## 2023-01-04 NOTE — QUICK NOTE
Post Op Check:    39 yo female now POD 0 s/p lap choley, also s/p ERCP yesterday  Patient denied having any nausea, vomiting, and chest pain  The patient does endorse some mild shortness of breath  She has not had a bowel movement since coming up to her regular hospital room, and she has not passed flatus  Patient states that she has abdominal pain in her RUQ that radiates to her back  She describes the pain as "like a knife stabbing her"  Scheduled tylenol and gabapentin were initiated  Robaxin was also initiated  Encourage IS use x10/hour  Have patient OOB and ambulating  Possible dc home this afternoon  General: NAD  HENT: NCAT MMM  Neck: supple, no JVD  CV: nl rate  Lungs: nl wob  No resp distress  ABD: Soft, TTP in RUQ, nondistended  Incision c/d/i    Extrem: No CCE  Neuro: AAOx3     Plan:  Diet GI; Lo Fat  Continue to monitor  Pain and nausea control PRN    Miladys Gutierres MD  General Surgery Resident

## 2023-01-05 ENCOUNTER — TELEPHONE (OUTPATIENT)
Dept: SURGERY | Facility: CLINIC | Age: 43
End: 2023-01-05

## 2023-01-05 DIAGNOSIS — Z90.49 STATUS POST LAPAROSCOPIC CHOLECYSTECTOMY: Primary | ICD-10-CM

## 2023-01-05 RX ORDER — OXYCODONE HYDROCHLORIDE 5 MG/1
5 TABLET ORAL EVERY 4 HOURS PRN
Qty: 15 TABLET | Refills: 0 | Status: SHIPPED | OUTPATIENT
Start: 2023-01-05

## 2023-01-05 NOTE — UTILIZATION REVIEW
NOTIFICATION OF ADMISSION DISCHARGE   This is a Notification of Discharge from [facility]  Please be advised that this patient has been discharge from our facility  Below you will find the admission and discharge date and time including the patient’s disposition  UTILIZATION REVIEW CONTACT:  Addison Gonzáles MA  Utilization   Network Utilization Review Department  Phone: [phone] x carefully listen to the prompts  All voicemails are confidential   Email: [email or web address]     ADMISSION INFORMATION  PRESENTATION DATE: 1/1/2023  8:53 PM  OBERVATION ADMISSION DATE:   INPATIENT ADMISSION DATE: 1/1/23 11:15 PM   DISCHARGE DATE: 1/4/2023  6:46 PM   DISPOSITION:Home/Self Care    IMPORTANT INFORMATION:  Send all requests for admission clinical reviews, approved or denied determinations and any other requests to dedicated fax number below belonging to the campus where the patient is receiving treatment   List of dedicated fax numbers:  [address] DENIALS (Administrative/Medical Necessity) [phone]   [address] (Maternity/NICU/Pediatrics) [phone]   [facility] [phone]   [place] [address] [phone]   [address] [phone]   220 [facility][note 3] [phone]   90 [facility] [phone]   [address] [place] [address] 119 [phone]   [facility]  [phone]   [address] [facility] [phone]   [address] [facility] [address] [facility] [phone]

## 2023-01-05 NOTE — TELEPHONE ENCOUNTER
Patient had lap arleen done on 1/3/23 with Dr Nicole Bernabe  At d/c she ordered Oxycodone that [facility] [address]  The pharmacy does not have any Oxycodone and the medication needs to be authorized  She asked 2 things can the order be transferred to the [facility] on [place], [place] and / or get authorization for her pain meds  She has [employer] ID# [id number] INSURANCE PHONE #'s are [phone] or [phone]  Patient is in a lot of pain  Please have done asap

## 2023-02-08 NOTE — PROGRESS NOTES
Assessment/Plan:     Problem List Items Addressed This Visit    None  Visit Diagnoses     Sebaceous cyst of labia    -  Primary          Reviewed physical exam findings with the patient  Discussed the findings did not correlate with HPV/condolyoma acuminata and/or molluscum contagiosum  The bumps presented as clogged hair follicle/sebaceous cyst  Reviewed conservative management with the patient, if she desires, including warm compresses and OTC hydrocortisone cream    Advised patient to call the office if bumps spread or more symptoms present  STI testing declined by the patient at this time  Subjective:      Patient ID: Risa Najjar is a 38 y o  female  Patient presents to the office for vagina lumps on both upper labia  She noticed these lumps in January  She reports that the bumps may have minimally grown in size  She denies itching, discharge, and/or pain  Denies vaginal symptoms of discharge/odor/irritation/itching  She is in the process of doing laser hair removal and shaves her pubic area  No new sexual partners but recently ; ex- was with other partners  She reports that her kids currently have molluscum contagiosum  She only desires testing/biopsy if absolutely necessary due to recent changes in insurance  The following portions of the patient's history were reviewed and updated as appropriate:   She  has a past medical history of Abnormal Pap smear of cervix, Deviated septum, Ear problems, GERD (gastroesophageal reflux disease), Heartburn during pregnancy, Migraine, Palpitations, and Varicella    She   Patient Active Problem List    Diagnosis Date Noted   • Choledocholithiasis 01/02/2023   • Encounter for gynecological examination (general) (routine) without abnormal findings 07/19/2022   • Migraine    • Family history of genetic disease 08/31/2021   • Nasal septal deviation 09/03/2019   • Nasal turbinate hypertrophy 09/03/2019   • Anxiety state 02/17/2012   • Esophageal reflux 02/17/2012     She  has a past surgical history that includes Dental surgery; Nasal septum surgery; Dilation and curettage of uterus; Breast cyst aspiration (Left, 2014); pr laparoscopy w/rmvl adnexal structures (Bilateral, 5/10/2022); and CHOLECYSTECTOMY LAPAROSCOPIC (N/A, 1/4/2023)  Her family history includes Alcohol abuse in her paternal grandfather; Arthritis in her maternal grandmother; Birth defects in her sister; Cancer in her maternal grandfather, maternal grandmother, paternal grandfather, and paternal grandmother; Colon cancer in her maternal aunt; Diabetes in her paternal grandmother; Drug abuse in her paternal uncle; Hearing loss in her maternal grandfather and paternal grandmother; Hyperlipidemia in her maternal grandmother and paternal grandmother; Hypertension in her maternal grandmother and paternal grandmother; Lung cancer in her paternal grandfather; Lung disease in her paternal grandfather; Mental illness in her paternal grandfather; Violetta Gong / Saurabh Laurent in her mother; Vision loss in her maternal grandfather  She  reports that she has never smoked  She has never used smokeless tobacco  She reports current alcohol use  She reports that she does not use drugs    Current Outpatient Medications   Medication Sig Dispense Refill   • multivitamin (THERAGRAN) TABS Take 1 tablet by mouth daily     • oxyCODONE (ROXICODONE) 5 immediate release tablet Take 1 tablet (5 mg total) by mouth every 4 (four) hours as needed for moderate pain or severe pain Max Daily Amount: 30 mg (Patient not taking: Reported on 2/15/2023) 15 tablet 0   • methocarbamol (ROBAXIN) 500 mg tablet Take 1 tablet (500 mg total) by mouth every 6 (six) hours as needed for muscle spasms for up to 14 days 56 tablet 0   • naproxen (NAPROSYN) 500 mg tablet TAKE 1 TABLET BY MOUTH TWICE A DAY FOR 14 DAYS (Patient not taking: Reported on 2/15/2023)     • pantoprazole (PROTONIX) 20 mg tablet Take 1 tablet (20 mg total) by mouth daily (Patient not taking: Reported on 2/15/2023) 20 tablet 0     No current facility-administered medications for this visit  Current Outpatient Medications on File Prior to Visit   Medication Sig   • multivitamin (THERAGRAN) TABS Take 1 tablet by mouth daily   • oxyCODONE (ROXICODONE) 5 immediate release tablet Take 1 tablet (5 mg total) by mouth every 4 (four) hours as needed for moderate pain or severe pain Max Daily Amount: 30 mg (Patient not taking: Reported on 2/15/2023)   • methocarbamol (ROBAXIN) 500 mg tablet Take 1 tablet (500 mg total) by mouth every 6 (six) hours as needed for muscle spasms for up to 14 days   • naproxen (NAPROSYN) 500 mg tablet TAKE 1 TABLET BY MOUTH TWICE A DAY FOR 14 DAYS (Patient not taking: Reported on 2/15/2023)   • pantoprazole (PROTONIX) 20 mg tablet Take 1 tablet (20 mg total) by mouth daily (Patient not taking: Reported on 2/15/2023)     No current facility-administered medications on file prior to visit  She has No Known Allergies       Review of Systems   Constitutional: Negative for chills, fatigue and fever  Gastrointestinal: Negative for abdominal distention, abdominal pain, constipation, diarrhea and nausea  Genitourinary: Negative for dysuria, enuresis, flank pain, frequency, hematuria, menstrual problem, pelvic pain, urgency, vaginal bleeding, vaginal discharge and vaginal pain  Genital lesions         Objective:      /76 (BP Location: Left arm, Patient Position: Sitting, Cuff Size: Standard)   Ht 5' 8" (1 727 m)   Wt 78 3 kg (172 lb 9 6 oz)   LMP 01/30/2023 (Approximate)   BMI 26 24 kg/m²          Physical Exam  Vitals and nursing note reviewed  Constitutional:       Appearance: Normal appearance  HENT:      Head: Normocephalic  Eyes:      Conjunctiva/sclera: Conjunctivae normal    Cardiovascular:      Rate and Rhythm: Normal rate and regular rhythm  Heart sounds: Normal heart sounds     Pulmonary:      Effort: Pulmonary effort is normal       Breath sounds: Normal breath sounds  Abdominal:      General: Abdomen is flat  Palpations: Abdomen is soft  Genitourinary:     Exam position: Lithotomy position  Pubic Area: No rash or pubic lice  Labia:         Right: No rash or tenderness  Left: No rash or tenderness  Urethra: No urethral pain  Vagina: No vaginal discharge  Comments: Two small bumps noted on the labia; both are approximately 2mm in size  No erythematic base noted or dimpling of the center  Both bumps are white to flesh-colored  A small head noted on the left labia bump and with mild palpation expressed a minimal amount of white discharge  Musculoskeletal:         General: Normal range of motion  Lymphadenopathy:      Lower Body: No right inguinal adenopathy  No left inguinal adenopathy  Skin:     General: Skin is warm and dry  Neurological:      Mental Status: She is alert and oriented to person, place, and time  Psychiatric:         Mood and Affect: Mood normal          Behavior: Behavior normal          Thought Content:  Thought content normal          Judgment: Judgment normal

## 2023-02-15 ENCOUNTER — OFFICE VISIT (OUTPATIENT)
Dept: OBGYN CLINIC | Facility: CLINIC | Age: 43
End: 2023-02-15

## 2023-02-15 VITALS
BODY MASS INDEX: 26.16 KG/M2 | SYSTOLIC BLOOD PRESSURE: 122 MMHG | DIASTOLIC BLOOD PRESSURE: 76 MMHG | HEIGHT: 68 IN | WEIGHT: 172.6 LBS

## 2023-02-15 DIAGNOSIS — N90.7 SEBACEOUS CYST OF LABIA: Primary | ICD-10-CM

## 2023-03-09 ENCOUNTER — HOSPITAL ENCOUNTER (OUTPATIENT)
Dept: RADIOLOGY | Age: 43
Discharge: HOME | End: 2023-03-09

## 2023-03-09 VITALS — HEIGHT: 68 IN | WEIGHT: 165 LBS | BODY MASS INDEX: 25.01 KG/M2

## 2023-03-09 DIAGNOSIS — Z12.31 ENCOUNTER FOR SCREENING MAMMOGRAM FOR MALIGNANT NEOPLASM OF BREAST: ICD-10-CM

## 2023-09-28 ENCOUNTER — TELEPHONE (OUTPATIENT)
Dept: OBGYN CLINIC | Facility: CLINIC | Age: 43
End: 2023-09-28

## 2023-09-28 NOTE — TELEPHONE ENCOUNTER
Called and confirmed with patient, scheduled 9/29/23 1100 at [facility] [place] for 6 month f/u b/l diagnostic mammogram as recommended. Called Radiology and [address] [place] [address] at [phone] to request fax #. Fax # [phone]. Order faxed.

## 2023-09-28 NOTE — TELEPHONE ENCOUNTER
Patient left message on machine - was given a script 6/7 months ago for mammogram based on a lump. Had that done, had to have it done again based on results. Was recommended a 6 month check up. 6 months is now and with [facility], closing offices Friday but most cost effective with insurance plan. Was told in order to get mammogram done tomorrow with them will need a b/l diagnostic script from office. Can be faxed. Has appt tomorrow morning, will need script prior.

## 2024-02-21 PROBLEM — Z01.419 ENCOUNTER FOR GYNECOLOGICAL EXAMINATION (GENERAL) (ROUTINE) WITHOUT ABNORMAL FINDINGS: Status: RESOLVED | Noted: 2022-07-19 | Resolved: 2024-02-21

## 2024-03-26 ENCOUNTER — NURSE TRIAGE (OUTPATIENT)
Age: 44
End: 2024-03-26

## 2024-03-26 NOTE — TELEPHONE ENCOUNTER
Regarding: Patient has a lump in armpit - not painful  ----- Message from iMlvia Ballesteros sent at 3/26/2024  9:35 AM EDT -----  Patient called she found a lump in her arm pit that is about the size of her pinky no redness not painful

## 2024-03-26 NOTE — TELEPHONE ENCOUNTER
"Reason for Disposition  • Small swelling or lump present > 1 week    Answer Assessment - Initial Assessment Questions  Crystal called with lump below right axillae and to right of breast in area where bra strap sits. Noted lump last night. Denies redness, warm to touch. Denies pain. Lump is moveable. Reports diagnostic mammo f/u last year completed at [facility]( facility is now closed per patient). She received diagnostic mammo result from radiologist at time of imaging and states mammo negative.  She has copy of result and disc. Requested she upload to her chart for provider to review.  Recommended f/u with primary for skin nodule right rib area.  1. APPEARANCE of SWELLING: \"What does it look like?\" (e.g., lymph node, insect bite, mole)      Small lump  2. SIZE: \"How large is the swelling?\" (inches, cm or compare to coins)      Size of pencil eraser  3. LOCATION: \"Where is the swelling located?\"      Right rib area below axillae and to right of breast. Area where bra strap sits below arm  4. ONSET: \"When did the swelling start?\"      Unsure-noticed lump last night  5. PAIN: \"Is it painful?\" If Yes, ask: \"How much?\"      denies  6. ITCH: \"Does it itch?\" If Yes, ask: \"How much?\"      denies  7. CAUSE: \"What do you think caused the swelling?\"      unsure  8. OTHER SYMPTOMS: \"Do you have any other symptoms?\" (e.g., fever)      Denies    Protocols used: Skin Lump or Localized Swelling-ADULT-[place]    "

## 2024-04-17 ENCOUNTER — TELEPHONE (OUTPATIENT)
Dept: MAMMOGRAPHY | Facility: CLINIC | Age: 44
End: 2024-04-17

## 2024-05-06 ENCOUNTER — ANESTHESIA EVENT (OUTPATIENT)
Dept: PERIOP | Facility: HOSPITAL | Age: 44
End: 2024-05-06
Payer: SELF-PAY

## 2024-05-06 NOTE — PRE-PROCEDURE INSTRUCTIONS
Pre-Surgery Instructions:   Medication Instructions    NON FORMULARY Stop taking 7 days prior to surgery.    Medication instructions for day surgery reviewed. Please use only a sip of water to take your instructed medications. Avoid all over the counter vitamins, supplements and NSAIDS for one week prior to surgery per anesthesia guidelines. Tylenol is ok to take as needed.     You will receive a call one business day prior to surgery with an arrival time and hospital directions. If your surgery is scheduled on a Monday, the hospital will be calling you on the Friday prior to your surgery. If you have not heard from anyone by 8pm, please call the hospital supervisor through the hospital  at [phone]. ([place] [phone] or [place] [phone]).    Do not eat or drink anything after midnight the night before your surgery, including candy, mints, lifesavers, or chewing gum. Do not drink alcohol 24hrs before your surgery. Try not to smoke at least 24hrs before your surgery.       Follow the pre surgery showering instructions as listed in the “My Surgical Experience Booklet” or otherwise provided by your surgeon's office. Do not use a blade to shave the surgical area 1 week before surgery. It is okay to use a clean electric clippers up to 24 hours before surgery. Do not apply any lotions, creams, including makeup, cologne, deodorant, or perfumes after showering on the day of your surgery. Do not use dry shampoo, hair spray, hair gel, or any type of hair products.     No contact lenses, eye make-up, or artificial eyelashes. Remove nail polish, including gel polish, and any artificial, gel, or acrylic nails if possible. Remove all jewelry including rings and body piercing jewelry.     Wear causal clothing that is easy to take on and off. Consider your type of surgery.    Keep any valuables, jewelry, piercings at home. Please bring any specially ordered equipment (sling, braces) if indicated.    Arrange  for a responsible person to drive you to and from the hospital on the day of your surgery. Please confirm the visitor policy for the day of your procedure when you receive your phone call with an arrival time.     Call the surgeon's office with any new illnesses, exposures, or additional questions prior to surgery.    Please reference your “My Surgical Experience Booklet” for additional information to prepare for your upcoming surgery.

## 2024-05-06 NOTE — H&P
"H&P Exam - Zuleika Dolan 43 y.o. female MRN: [id number]    Unit/Bed#:  Encounter: [id number]    Assessment:  Bilateral breast ptosis    Plan:  Bilateral mastopexy this is a 43-year-old female with very fibrocystic breast close left breast is larger than her right and desires a mastopexy.    History of Present Illness   43-year-old female who desires mastopexy    Review of Systems   All other systems reviewed and are negative.      Historical Information   Past Medical History:   Diagnosis Date    Abnormal Pap smear of cervix     Deviated septum     Ear problems     GERD (gastroesophageal reflux disease)     Heartburn during pregnancy     Migraine     Palpitations     TMJ (dislocation of temporomandibular joint)     UTI (urinary tract infection)     Uqora helping a lot    Varicella      Past Surgical History:   Procedure Laterality Date    BREAST CYST ASPIRATION Left 2014    cyst asp     CHOLECYSTECTOMY LAPAROSCOPIC N/A 1/4/2023    Procedure: CHOLECYSTECTOMY LAPAROSCOPIC;  Surgeon: Lauryn Ullrich, DO;  Location: [facility];  Service: General    DENTAL SURGERY      DILATION AND CURETTAGE OF UTERUS      NASAL SEPTUM SURGERY      ND LAPAROSCOPY W/RMVL ADNEXAL STRUCTURES Bilateral 5/10/2022    Procedure: SALPINGECTOMY, LAPAROSCOPIC;  Surgeon: Porsha Mccord MD;  Location: [facility];  Service: Gynecology     Social History   Social History     Substance and Sexual Activity   Alcohol Use Yes    Comment: social/; rare     Social History     Substance and Sexual Activity   Drug Use No     Social History     Tobacco Use   Smoking Status Never   Smokeless Tobacco Never     E-Cigarette Use: Never User     E-Cigarette/Vaping Substances    Nicotine No     THC No     CBD No     Flavoring No     Other No     Unknown No        Family History: non-contributory    Meds/Allergies   all medications and allergies reviewed  No Known Allergies    Objective   First Vitals:   Height: 5' 9\" (175.3 cm) (05/06/24 1338)  Weight - Scale: " "66.7 kg (147 lb) (05/06/24 1338)    Current Vitals:   Height: 5' 9\" (175.3 cm) (05/06/24 1338)  Weight - Scale: 66.7 kg (147 lb) (05/06/24 1338)    No intake or output data in the 24 hours ending 05/06/24 1659    Invasive Devices       None                   Physical Exam examination of the head ears eyes nose and throat is within normal limits heart sounds S1-S2 heard no murmurs or gallops lungs clear abdomen soft extremities are within normal limits bilateral breast are asymmetrical and ptotic with moderate fibrocystic disease    Lab Results:   Imaging:   EKG, Pathology, and Other Studies:     Code Status: Prior  Advance Directive and Living Will:      Power of :    POLST:      Counseling / Coordination of Care:   None  "

## 2024-05-06 NOTE — ANESTHESIA PREPROCEDURE EVALUATION
"Procedure:  BREAST MASTOPEXY (Bilateral: Breast)    Relevant Problems   CARDIO   (+) Migraine      GI/HEPATIC   (+) Esophageal reflux      NEURO/PSYCH   (+) Anxiety state   (+) Migraine        Physical Exam    Airway    Mallampati score: II  TM Distance: >3 FB  Neck ROM: full     Dental       Cardiovascular  Cardiovascular exam normal    Pulmonary  Pulmonary exam normal     Other Findings  post-pubertal.      Anesthesia Plan  ASA Score- 2     Anesthesia Type- general with ASA Monitors.         Additional Monitors:     Airway Plan: ETT.           Plan Factors-Exercise tolerance (METS): >4 METS.    Chart reviewed.   Existing labs reviewed. Patient summary reviewed.    Patient is not a current smoker. Patient not instructed to abstain from smoking on day of procedure. Patient did not smoke on day of surgery.            Induction- intravenous.    Postoperative Plan- Plan for postoperative opioid use.     Informed Consent- Anesthetic plan and risks discussed with patient.  I personally reviewed this patient with the CRNA. Discussed and agreed on the Anesthesia Plan with the CRNA..              No results found for: \"HGBA1C\"    Lab Results   Component Value Date    K 4.3 01/04/2023     01/04/2023    CO2 22 01/04/2023    BUN 11 01/04/2023    CREATININE 0.65 01/04/2023    GLUF 72 12/22/2021    CALCIUM 9.3 01/04/2023     (H) 01/04/2023     (H) 01/04/2023    ALKPHOS 240 (H) 01/04/2023    EGFR 111 01/04/2023       Lab Results   Component Value Date    WBC 5.61 01/02/2023    HGB 11.8 01/02/2023    HCT 34.3 (L) 01/02/2023    MCV 94 01/02/2023     01/02/2023   Normal sinus rhythm with sinus arrhythmia  Rightward axis  Incomplete right bundle branch block  Borderline ECG  No previous ECGs available  Confirmed by Kapil Cook ([id number]) on 1/3/2023 9:38:43 AM    "

## 2024-05-07 ENCOUNTER — HOSPITAL ENCOUNTER (OUTPATIENT)
Facility: HOSPITAL | Age: 44
Setting detail: OUTPATIENT SURGERY
Discharge: HOME | End: 2024-05-07
Attending: PLASTIC SURGERY | Admitting: PLASTIC SURGERY
Payer: SELF-PAY

## 2024-05-07 ENCOUNTER — ANESTHESIA (OUTPATIENT)
Dept: PERIOP | Facility: HOSPITAL | Age: 44
End: 2024-05-07
Payer: SELF-PAY

## 2024-05-07 VITALS
OXYGEN SATURATION: 100 % | DIASTOLIC BLOOD PRESSURE: 57 MMHG | HEIGHT: 69 IN | HEART RATE: 54 BPM | SYSTOLIC BLOOD PRESSURE: 114 MMHG | WEIGHT: 145 LBS | TEMPERATURE: 96.8 F | RESPIRATION RATE: 16 BRPM | BODY MASS INDEX: 21.48 KG/M2

## 2024-05-07 DIAGNOSIS — Z41.1 ENCOUNTER FOR COSMETIC SURGERY: ICD-10-CM

## 2024-05-07 DIAGNOSIS — N64.81 PTOSIS OF BOTH BREASTS: Primary | ICD-10-CM

## 2024-05-07 LAB
EXT PREGNANCY TEST URINE: NEGATIVE
EXT. CONTROL: NORMAL

## 2024-05-07 PROCEDURE — 88305 TISSUE EXAM BY PATHOLOGIST: CPT | Performed by: PATHOLOGY

## 2024-05-07 PROCEDURE — 81025 URINE PREGNANCY TEST: CPT | Performed by: PLASTIC SURGERY

## 2024-05-07 RX ORDER — MIDAZOLAM HYDROCHLORIDE 2 MG/2ML
INJECTION, SOLUTION INTRAMUSCULAR; INTRAVENOUS AS NEEDED
Status: DISCONTINUED | OUTPATIENT
Start: 2024-05-07 | End: 2024-05-07

## 2024-05-07 RX ORDER — FENTANYL CITRATE/PF 50 MCG/ML
50 SYRINGE (ML) INJECTION
Status: DISCONTINUED | OUTPATIENT
Start: 2024-05-07 | End: 2024-05-07 | Stop reason: HOSPADM

## 2024-05-07 RX ORDER — LIDOCAINE HYDROCHLORIDE AND EPINEPHRINE 10; 10 MG/ML; UG/ML
INJECTION, SOLUTION INFILTRATION; PERINEURAL AS NEEDED
Status: DISCONTINUED | OUTPATIENT
Start: 2024-05-07 | End: 2024-05-07 | Stop reason: HOSPADM

## 2024-05-07 RX ORDER — HYDROMORPHONE HCL/PF 1 MG/ML
1 SYRINGE (ML) INJECTION EVERY 2 HOUR PRN
Status: DISCONTINUED | OUTPATIENT
Start: 2024-05-07 | End: 2024-05-07 | Stop reason: HOSPADM

## 2024-05-07 RX ORDER — PROPOFOL 10 MG/ML
INJECTION, EMULSION INTRAVENOUS AS NEEDED
Status: DISCONTINUED | OUTPATIENT
Start: 2024-05-07 | End: 2024-05-07

## 2024-05-07 RX ORDER — ONDANSETRON 2 MG/ML
4 INJECTION INTRAMUSCULAR; INTRAVENOUS ONCE AS NEEDED
Status: DISCONTINUED | OUTPATIENT
Start: 2024-05-07 | End: 2024-05-07 | Stop reason: HOSPADM

## 2024-05-07 RX ORDER — ONDANSETRON 2 MG/ML
4 INJECTION INTRAMUSCULAR; INTRAVENOUS ONCE
Status: DISCONTINUED | OUTPATIENT
Start: 2024-05-07 | End: 2024-05-07 | Stop reason: HOSPADM

## 2024-05-07 RX ORDER — DEXAMETHASONE SODIUM PHOSPHATE 10 MG/ML
INJECTION, SOLUTION INTRAMUSCULAR; INTRAVENOUS AS NEEDED
Status: DISCONTINUED | OUTPATIENT
Start: 2024-05-07 | End: 2024-05-07

## 2024-05-07 RX ORDER — SODIUM CHLORIDE 9 MG/ML
INJECTION INTRAVENOUS AS NEEDED
Status: DISCONTINUED | OUTPATIENT
Start: 2024-05-07 | End: 2024-05-07 | Stop reason: HOSPADM

## 2024-05-07 RX ORDER — HYDROMORPHONE HCL/PF 1 MG/ML
SYRINGE (ML) INJECTION AS NEEDED
Status: DISCONTINUED | OUTPATIENT
Start: 2024-05-07 | End: 2024-05-07

## 2024-05-07 RX ORDER — ACETAMINOPHEN 10 MG/ML
INJECTION, SOLUTION INTRAVENOUS AS NEEDED
Status: DISCONTINUED | OUTPATIENT
Start: 2024-05-07 | End: 2024-05-07

## 2024-05-07 RX ORDER — ALBUTEROL SULFATE 2.5 MG/3ML
2.5 SOLUTION RESPIRATORY (INHALATION) ONCE AS NEEDED
Status: DISCONTINUED | OUTPATIENT
Start: 2024-05-07 | End: 2024-05-07 | Stop reason: HOSPADM

## 2024-05-07 RX ORDER — ONDANSETRON 2 MG/ML
INJECTION INTRAMUSCULAR; INTRAVENOUS AS NEEDED
Status: DISCONTINUED | OUTPATIENT
Start: 2024-05-07 | End: 2024-05-07

## 2024-05-07 RX ORDER — SODIUM CHLORIDE, SODIUM LACTATE, POTASSIUM CHLORIDE, CALCIUM CHLORIDE 600; 310; 30; 20 MG/100ML; MG/100ML; MG/100ML; MG/100ML
50 INJECTION, SOLUTION INTRAVENOUS CONTINUOUS
Status: DISCONTINUED | OUTPATIENT
Start: 2024-05-07 | End: 2024-05-07 | Stop reason: HOSPADM

## 2024-05-07 RX ORDER — CEFAZOLIN SODIUM 1 G/50ML
1000 SOLUTION INTRAVENOUS ONCE
Status: COMPLETED | OUTPATIENT
Start: 2024-05-07 | End: 2024-05-07

## 2024-05-07 RX ORDER — MAGNESIUM HYDROXIDE 1200 MG/15ML
LIQUID ORAL AS NEEDED
Status: DISCONTINUED | OUTPATIENT
Start: 2024-05-07 | End: 2024-05-07 | Stop reason: HOSPADM

## 2024-05-07 RX ORDER — LIDOCAINE HYDROCHLORIDE AND EPINEPHRINE 5; 5 MG/ML; UG/ML
INJECTION, SOLUTION INFILTRATION; PERINEURAL AS NEEDED
Status: DISCONTINUED | OUTPATIENT
Start: 2024-05-07 | End: 2024-05-07 | Stop reason: HOSPADM

## 2024-05-07 RX ORDER — LIDOCAINE HYDROCHLORIDE 10 MG/ML
INJECTION, SOLUTION EPIDURAL; INFILTRATION; INTRACAUDAL; PERINEURAL AS NEEDED
Status: DISCONTINUED | OUTPATIENT
Start: 2024-05-07 | End: 2024-05-07

## 2024-05-07 RX ORDER — TRAMADOL HYDROCHLORIDE 50 MG/1
50 TABLET ORAL EVERY 6 HOURS PRN
Status: DISCONTINUED | OUTPATIENT
Start: 2024-05-07 | End: 2024-05-07 | Stop reason: HOSPADM

## 2024-05-07 RX ORDER — ROCURONIUM BROMIDE 10 MG/ML
INJECTION, SOLUTION INTRAVENOUS AS NEEDED
Status: DISCONTINUED | OUTPATIENT
Start: 2024-05-07 | End: 2024-05-07

## 2024-05-07 RX ORDER — CEPHALEXIN 500 MG/1
500 CAPSULE ORAL EVERY 8 HOURS SCHEDULED
Qty: 21 CAPSULE | Refills: 0 | Status: SHIPPED | OUTPATIENT
Start: 2024-05-07 | End: 2024-05-14

## 2024-05-07 RX ORDER — TRAMADOL HYDROCHLORIDE 50 MG/1
50 TABLET ORAL EVERY 6 HOURS PRN
Qty: 30 TABLET | Refills: 0 | Status: SHIPPED | OUTPATIENT
Start: 2024-05-07 | End: 2024-05-17

## 2024-05-07 RX ORDER — HYDROMORPHONE HCL/PF 1 MG/ML
0.5 SYRINGE (ML) INJECTION
Status: DISCONTINUED | OUTPATIENT
Start: 2024-05-07 | End: 2024-05-07 | Stop reason: HOSPADM

## 2024-05-07 RX ORDER — FENTANYL CITRATE 50 UG/ML
INJECTION, SOLUTION INTRAMUSCULAR; INTRAVENOUS AS NEEDED
Status: DISCONTINUED | OUTPATIENT
Start: 2024-05-07 | End: 2024-05-07

## 2024-05-07 RX ADMIN — SUGAMMADEX 150 MG: 100 INJECTION, SOLUTION INTRAVENOUS at 10:38

## 2024-05-07 RX ADMIN — PROPOFOL 200 MG: 10 INJECTION, EMULSION INTRAVENOUS at 07:36

## 2024-05-07 RX ADMIN — ROCURONIUM BROMIDE 20 MG: 10 INJECTION, SOLUTION INTRAVENOUS at 08:19

## 2024-05-07 RX ADMIN — TRAMADOL HYDROCHLORIDE 50 MG: 50 TABLET, COATED ORAL at 11:57

## 2024-05-07 RX ADMIN — MIDAZOLAM HYDROCHLORIDE 2 MG: 1 INJECTION, SOLUTION INTRAMUSCULAR; INTRAVENOUS at 07:31

## 2024-05-07 RX ADMIN — ROCURONIUM BROMIDE 50 MG: 10 INJECTION, SOLUTION INTRAVENOUS at 07:37

## 2024-05-07 RX ADMIN — PROPOFOL 50 MG: 10 INJECTION, EMULSION INTRAVENOUS at 08:20

## 2024-05-07 RX ADMIN — FENTANYL CITRATE 50 MCG: 50 INJECTION, SOLUTION INTRAMUSCULAR; INTRAVENOUS at 07:31

## 2024-05-07 RX ADMIN — DEXMEDETOMIDINE HYDROCHLORIDE 4 MCG: 100 INJECTION, SOLUTION INTRAVENOUS at 08:06

## 2024-05-07 RX ADMIN — FENTANYL CITRATE 50 MCG: 50 INJECTION, SOLUTION INTRAMUSCULAR; INTRAVENOUS at 11:21

## 2024-05-07 RX ADMIN — DEXMEDETOMIDINE HYDROCHLORIDE 4 MCG: 100 INJECTION, SOLUTION INTRAVENOUS at 08:32

## 2024-05-07 RX ADMIN — ONDANSETRON 4 MG: 2 INJECTION INTRAMUSCULAR; INTRAVENOUS at 10:23

## 2024-05-07 RX ADMIN — HYDROMORPHONE HYDROCHLORIDE 0.5 MG: 1 INJECTION, SOLUTION INTRAMUSCULAR; INTRAVENOUS; SUBCUTANEOUS at 10:25

## 2024-05-07 RX ADMIN — CEFAZOLIN SODIUM 1000 MG: 1 SOLUTION INTRAVENOUS at 07:31

## 2024-05-07 RX ADMIN — LIDOCAINE HYDROCHLORIDE 50 MG: 10 INJECTION, SOLUTION EPIDURAL; INFILTRATION; INTRACAUDAL; PERINEURAL at 07:36

## 2024-05-07 RX ADMIN — DEXAMETHASONE SODIUM PHOSPHATE 8 MG: 10 INJECTION, SOLUTION INTRAMUSCULAR; INTRAVENOUS at 07:37

## 2024-05-07 RX ADMIN — PROPOFOL 80 MCG/KG/MIN: 10 INJECTION, EMULSION INTRAVENOUS at 07:37

## 2024-05-07 RX ADMIN — DEXMEDETOMIDINE HYDROCHLORIDE 4 MCG: 100 INJECTION, SOLUTION INTRAVENOUS at 08:11

## 2024-05-07 RX ADMIN — FENTANYL CITRATE 50 MCG: 50 INJECTION, SOLUTION INTRAMUSCULAR; INTRAVENOUS at 08:20

## 2024-05-07 RX ADMIN — PROPOFOL 50 MG: 10 INJECTION, EMULSION INTRAVENOUS at 08:06

## 2024-05-07 RX ADMIN — SODIUM CHLORIDE, SODIUM LACTATE, POTASSIUM CHLORIDE, AND CALCIUM CHLORIDE 50 ML/HR: .6; .31; .03; .02 INJECTION, SOLUTION INTRAVENOUS at 06:30

## 2024-05-07 RX ADMIN — PROPOFOL 80 MCG/KG/MIN: 10 INJECTION, EMULSION INTRAVENOUS at 10:00

## 2024-05-07 RX ADMIN — ACETAMINOPHEN 1000 MG: 10 INJECTION INTRAVENOUS at 10:45

## 2024-05-07 RX ADMIN — SODIUM CHLORIDE, SODIUM LACTATE, POTASSIUM CHLORIDE, AND CALCIUM CHLORIDE: .6; .31; .03; .02 INJECTION, SOLUTION INTRAVENOUS at 08:30

## 2024-05-07 RX ADMIN — DEXMEDETOMIDINE HYDROCHLORIDE 4 MCG: 100 INJECTION, SOLUTION INTRAVENOUS at 08:09

## 2024-05-07 NOTE — OP NOTE
OPERATIVE REPORT  PATIENT NAME: Zuleika Dolan    :  1980  MRN: [id number]  Pt Location:  OR ROOM 07    SURGERY DATE: 2024    Surgeons and Role:     * Duke Conde MD - Primary    Preop Diagnosis:  Encounter for cosmetic surgery [Z41.1]    Post-Op Diagnosis Codes:     * Encounter for cosmetic surgery [Z41.1]    Procedure(s):  Bilateral - BREAST MASTOPEXY    Specimen(s):  ID Type Source Tests Collected by Time Destination   1 : LEFT BREAST TISSUE Tissue Breast, Left TISSUE EXAM Duke Conde MD 2024 0810    2 :  Tissue Breast, Right TISSUE EXAM Duke Conde MD 2024 0810        Estimated Blood Loss:   Minimal    Drains:  Closed/Suction Drain Inferior;Lateral;Right Breast Bulb 10 Fr. (Active)   Number of days: 0       Closed/Suction Drain Inferior;Lateral;Left Breast Bulb 10 Fr. (Active)   Number of days: 0       Anesthesia Type:   General    Operative Indications:  Encounter for cosmetic surgery [Z41.1]  Bilateral breast ptosis and asymmetry    Operative Findings:  Fibrocystic breast    Complications:   None    Procedure and Technique:  Patient was marked in the holding area for an inferior pedicle mastopexy.  Draped and prepped in normal sterile fashion after general endotracheal anesthesia.  Patient was injected with local anesthesia and then the premarked area was de-epithelialized the nipple areolar complex was cut out with a 42 mm cookie cutter.  The flaps were raised hemostasis achieved with electrocautery the wounds were irrigated with normal saline and a #10 channel drain was placed through separate stab wound in the flank the dermis was then approximated with 2-0 Vicryl suture T incision closed with a running subcuticular 3-0 Prolene suture and the nipple closed with a running subcuticular 4-0 Vicryl suture and identical procedure was performed on both sides patient tolerated the procedure well there was a great asymmetry between the breast and tissue was excised from the left  breast to create symmetry.   I was present for the entire procedure.    Patient Disposition:  PACU         SIGNATURE: Duke Conde MD  DATE: May 7, 2024  TIME: 10:37 AM

## 2024-05-07 NOTE — ANESTHESIA POSTPROCEDURE EVALUATION
Post-Op Assessment Note    CV Status:  Stable    Pain management: satisfactory to patient       Mental Status:  Awake, sleepy and arousable   Hydration Status:  Euvolemic   PONV Controlled:  Controlled   Airway Patency:  Patent     Post Op Vitals Reviewed: Yes    No anethesia notable event occurred.    Staff: CRNA, Anesthesiologist               BP 99/50 (05/07/24 1052)    Temp 98.4 °F (36.9 °C) (05/07/24 1052)    Pulse 66 (05/07/24 1052)   Resp 16 (05/07/24 1052)    SpO2 100 % (05/07/24 1052)

## 2024-05-07 NOTE — DISCHARGE INSTR - AVS FIRST PAGE
THIS IS CONSIDERED MAJOR SURGERY. PLEASE ACT ACCORDINGLY. THE FOLLOWING INSTRUCTIONS ARE INTENDED AS A GUIDE FOR YOU TO FOLLOW AT HOME. THEY ARE NOT INTENDED AS A SUBSTITUTE FOR MEDICAL CARE.    AFTER SURGERY:    Following surgery before you're discharged from the short procedure unit the nurse will instruct you on how to activate your drains.  If you have EXCESSIVE DRAINAGE, meaning that the BULBS fill up twice in 1 hour, please call Dr. Conde for further instructions.  Your drains will be removed the day after surgery in Dr. Conde's office.    For your protection, we recommend that someone spend the first night with you because of medications prescribed and for assistance getting out of bed.  Leave your dressings in place; Dr. Conde will change them the following day when drains are removed.  After your first visit, no dressings are necessary.  You may shower after your drains are removed; wash gently with soap and water and pat dry. Put on a toby shirt/tank top then your bra on top of the shirt. NOTE: Shower only; do note use hot tub or baths until instructed. Do not use deodorant  until after the sutures are removed. I  Fill your prescriptions and taking medications as directed.  A very deep, sore muscular pain it is associated with this type of surgery and this is normal.  Pain medication will decrease the amount of pain that you have, but will not totally take it away, this is normal.  Do not drive while taking pain medication.  If you're more comfortable lying on your side this is permissible; however, do not sleep on your stomach for 2 weeks following surgery.  Any signs of bleeding or rash should be reported to the office.  If one breast becomes considerably larger was harder than the other, please call the office immediately.    FOLLOW-UP CARE:    It is recommended that you do not wear a bra for at least 3 weeks after surgery to allow the implants to drop down into the pocket.  Stitches will be  removed on the 10th postoperative day.  Never use a Heating Pad or Microwave Beads!  After stitches are removed it is important to use Scar Cream for approximately 3 months after surgery.      IF YOU HAVE ANY PROBLEMS OR QUESTIONS, PLEASE DO NOT HESITATE TO CALL THE OFFICE.    [phone]      Your first postoperative appointment will be *** @ *** {Time; am/pm:[id number]}

## 2024-05-07 NOTE — NURSING NOTE
OP sites without drainage. Surgical bra intact. LAUREL functioning scant bloody drainage. No noted distress.

## 2024-05-07 NOTE — NURSING NOTE
Pt had to wait for her mother to pick her up. Pt in no distress; vs stable. AVS reviewed/drain teaching completed.

## 2024-05-07 NOTE — INTERVAL H&P NOTE
H&P reviewed. After examining the patient I find no changes in the patients condition since the H&P had been written.    Vitals:    05/07/24 0625   BP: 98/56   Pulse: 68   Resp: 18   Temp: (!) 97 °F (36.1 °C)   SpO2: 98%   The patient was examined in the holding area and marked for a bilateral mastopexy nothing had changed in the patient's history and physical however this is being dictated postoperatively

## 2024-05-13 PROCEDURE — 88305 TISSUE EXAM BY PATHOLOGIST: CPT | Performed by: PATHOLOGY

## 2024-11-22 ENCOUNTER — NURSE TRIAGE (OUTPATIENT)
Age: 44
End: 2024-11-22

## 2024-11-22 NOTE — TELEPHONE ENCOUNTER
"Pt called in reporting irregular vaginal bleeding. States she has had 3 full periods within 30 days. She had her normal period at the end of October, two weeks later had another full period, two weeks later it happened again. Pt states she is still bleeding at this time but it is tapering off. Denies clots, pain, feeling lightheaded/dizzy. Pt is not on any hormonal medications or blood thinners. Denies chance of pregnancy. Pt given appointment for 11/25/24 and is thankful.    Reason for Disposition   Age > 39 years with irregular or excessive bleeding    Answer Assessment - Initial Assessment Questions  1. BLEEDING SEVERITY: \"Describe the bleeding that you are having.\" \"How much bleeding is there?\"       Light bleeding at this time  2. ONSET: \"When did the bleeding begin?\" \"Is it continuing now?\"      A few days ago  3. MENSTRUAL PERIOD: \"When was the last normal menstrual period?\" \"How is this different than your period?\"      End of october  4. REGULARITY: \"How regular are your periods?\"      regular  5. ABDOMEN PAIN: \"Do you have any pain?\" \"How bad is the pain?\"  (e.g., Scale 0-10; none, mild, moderate, or severe)      denies  6. PREGNANCY: \"Is there any chance you are pregnant?\" \"When was your last menstrual period?\"      denies  7. BREASTFEEDING: \"Are you breastfeeding?\"      denies  8. HORMONE MEDICINES: \"Are you taking any hormone medicines, prescription or over-the-counter?\" (e.g., birth control pills, estrogen)      denies  9. BLOOD THINNER MEDICINES: \"Do you take any blood thinners?\" (e.g., Coumadin / warfarin, Pradaxa / dabigatran, aspirin)      denies  10. CAUSE: \"What do you think is causing the bleeding?\" (e.g., recent gyn surgery, recent gyn procedure; known bleeding disorder, cervical cancer, polycystic ovarian disease, fibroids)          unsure  11. HEMODYNAMIC STATUS: \"Are you weak or feeling lightheaded?\" If Yes, ask: \"Can you stand and walk normally?\"         denies  12. OTHER SYMPTOMS: \"What " "other symptoms are you having with the bleeding?\" (e.g., passed tissue, vaginal discharge, fever, menstrual-type cramps)        denies    Protocols used: Vaginal Bleeding - Abnormal-Adult-OH    "

## 2024-11-22 NOTE — PROGRESS NOTES
Name: Zuleika Dolan      : 1980      MRN: [id number]  Encounter Provider: ROSARIO Santiago  Encounter Date: 2024   Encounter department: [facility]  :  Assessment & Plan  Abnormal uterine bleeding    Orders:    Liquid-based pap, screening    Follicle stimulating hormone; Future    Luteinizing hormone; Future    Prolactin; Future    hCG, quantitative; Future    T4, free; Future    TSH, 3rd generation; Future    US pelvis complete non OB; Future    CBC and differential; Future    Iron Panel (Includes Ferritin, Iron Sat%, Iron, and TIBC); Future  1. We reviewed the possible etiologies of her AUB, including polyp, adenomyosis, leiomyoma, hyperplasia/malignancy, coagulopathy, ovulatory dysfunction, endometrial, iatrogenic.   We reviewed in women ages 19-39, AUB is usually as a result of pregnancy, structural lesions, endometrial hyperplasia, or hormonal contraception.  In women age 40 to menopause, the most common cause is anovulatory bleeding as a result of declining ovarian function.  2. EMB recommended in patients age > 45 with AUB.  EMB is also indicated in patients younger than 45 years with history of unopposed estrogen exposure (e.g. obesity or PCOS), failed medical management, or persistent AUB.  3. I reviewed patients heavy menstrual cycles and the various treatment options to help with menses including but not limited to: NSAIDS at the beginning of your menses, Lysteda to decrease the amount of bleeding, and hormonal options including OCPs, IUDs and Depo Provera, We discussed surgical options including endometrial ablation and hysterectomy. I reviewed risks vs. benefits of each therapy and side effects. Patient agreeable to: expectant management and monitoring menses.   4. Transvaginal ultrasound and blood work ordered.   5. Strict return precautions discussed including prolonged bleeding and saturating a pad an hour or symptomatic of blood loss.    6. Please return in 1-2 months for follow up and annual exam.        Screening examination for STI    Orders:    Chlamydia/GC amplified DNA by PCR    Trichomonas vaginalis Thin prep    Vaginal odor    Orders:    Molecular Vaginal Panel    Cervical cancer screening           History of Present Illness     HPI  Zuleika Dolan is a 43 y.o. female who presents to the office for abnormal uterine bleeding.     LMP: 2024   She reports regular menses prior to 2024 which include bleeding every 28 days lasting 4-5 days with 1.5 days of heavy flow saturating a super tampon every hour. She reports menorrhagia present for the past there years since having last child. Remainder of menses is moderate to light flow.  She reports 40 days ago had a normal menses then 1.5 weeks later had 4-5 days of light period with brown blood. Then two weeks had another periods lasting 5-6 days.     She reports a new sexual partner last month. Complaining of vaginal odor. No discharge, itching or irritation. History of recurrent BV/UTI.   (+) night sweats  Started [place] for recurrent urinary tract infections.   small clots  Endorses dysmenorrhea for the first two days of cycle.   She takes NSAIDS for the bleeding (TXA/NSAIDS/BC)   denies history of personal or family uterine/ovarian cancer.  history of abnormal paps - Last pap:2022 - ASCUS, -HPV   Previous imaging or testing: denies  (+) GYN history/surgeries: bilateral salpingectomy. History of 4 .   denies history of smoking.  denies history or current use of anticoagulants.   denies SOB/chest pain/dizziness/fatigue       History obtained from: patient    Review of Systems   Constitutional: Negative.    Gastrointestinal:  Negative for abdominal pain.   Genitourinary:  Positive for menstrual problem. Negative for pelvic pain, vaginal discharge and vaginal pain.   Skin: Negative.  Negative for color change.   Neurological:  Positive for light-headedness (on menses).    Psychiatric/Behavioral: Negative.       Medical History Reviewed by provider this encounter:     .     Objective   There were no vitals taken for this visit.     Physical Exam  Vitals and nursing note reviewed.   Constitutional:       Appearance: Normal appearance.   Abdominal:      General: Abdomen is flat.      Palpations: Abdomen is soft.   Genitourinary:     General: Normal vulva.      Exam position: Lithotomy position.      Labia:         Right: No rash, tenderness or lesion.         Left: No rash or tenderness.       Vagina: No signs of injury. No vaginal discharge, erythema, tenderness, bleeding or prolapsed vaginal walls.      Cervix: Cervical bleeding present. No cervical motion tenderness, discharge or lesion.      Uterus: Not tender and no uterine prolapse.       Adnexa:         Right: No tenderness.          Left: No tenderness.     Skin:     General: Skin is warm and dry.      Capillary Refill: Capillary refill takes less than 2 seconds.   Neurological:      Mental Status: She is alert and oriented to person, place, and time.   Psychiatric:         Mood and Affect: Mood normal.         Behavior: Behavior normal.         Thought Content: Thought content normal.         Judgment: Judgment normal.

## 2024-11-25 ENCOUNTER — OFFICE VISIT (OUTPATIENT)
Dept: OBGYN CLINIC | Facility: CLINIC | Age: 44
End: 2024-11-25
Payer: COMMERCIAL

## 2024-11-25 VITALS
SYSTOLIC BLOOD PRESSURE: 104 MMHG | BODY MASS INDEX: 23.4 KG/M2 | DIASTOLIC BLOOD PRESSURE: 82 MMHG | WEIGHT: 158 LBS | HEIGHT: 69 IN

## 2024-11-25 DIAGNOSIS — N89.8 VAGINAL ODOR: ICD-10-CM

## 2024-11-25 DIAGNOSIS — Z12.4 CERVICAL CANCER SCREENING: ICD-10-CM

## 2024-11-25 DIAGNOSIS — Z11.3 SCREENING EXAMINATION FOR STI: ICD-10-CM

## 2024-11-25 DIAGNOSIS — N93.9 ABNORMAL UTERINE BLEEDING: Primary | ICD-10-CM

## 2024-11-25 PROCEDURE — G0145 SCR C/V CYTO,THINLAYER,RESCR: HCPCS

## 2024-11-25 PROCEDURE — 99214 OFFICE O/P EST MOD 30 MIN: CPT

## 2024-11-25 PROCEDURE — 87591 N.GONORRHOEAE DNA AMP PROB: CPT

## 2024-11-25 PROCEDURE — 87491 CHLMYD TRACH DNA AMP PROBE: CPT

## 2024-11-25 PROCEDURE — 81514 NFCT DS BV&VAGINITIS DNA ALG: CPT

## 2024-11-25 PROCEDURE — G0476 HPV COMBO ASSAY CA SCREEN: HCPCS

## 2024-11-25 RX ORDER — PROPRANOLOL HYDROCHLORIDE 10 MG/1
1 TABLET ORAL DAILY
COMMUNITY
Start: 2024-09-20

## 2024-11-26 LAB
C GLABRATA DNA VAG QL NAA+PROBE: NEGATIVE
C KRUSEI DNA VAG QL NAA+PROBE: NEGATIVE
CANDIDA SP 6 PNL VAG NAA+PROBE: NEGATIVE
HPV HR 12 DNA CVX QL NAA+PROBE: POSITIVE
HPV16 DNA CVX QL NAA+PROBE: NEGATIVE
HPV18 DNA CVX QL NAA+PROBE: NEGATIVE
T VAGINALIS DNA VAG QL NAA+PROBE: NEGATIVE
VAGINOSIS/ITIS DNA PNL VAG PROBE+SIG AMP: NEGATIVE

## 2024-11-27 ENCOUNTER — RESULTS FOLLOW-UP (OUTPATIENT)
Dept: OBGYN CLINIC | Facility: CLINIC | Age: 44
End: 2024-11-27

## 2024-11-27 ENCOUNTER — HOSPITAL ENCOUNTER (OUTPATIENT)
Dept: ULTRASOUND IMAGING | Facility: HOSPITAL | Age: 44
Discharge: HOME | End: 2024-11-27
Payer: COMMERCIAL

## 2024-11-27 DIAGNOSIS — N93.9 ABNORMAL UTERINE BLEEDING: ICD-10-CM

## 2024-11-27 LAB
C TRACH DNA SPEC QL NAA+PROBE: NEGATIVE
LAB AP GYN PRIMARY INTERPRETATION: NORMAL
Lab: NORMAL
N GONORRHOEA DNA SPEC QL NAA+PROBE: NEGATIVE
T VAGINALIS DNA SPEC QL NAA+PROBE: NEGATIVE

## 2024-11-27 PROCEDURE — 76830 TRANSVAGINAL US NON-OB: CPT

## 2024-11-27 PROCEDURE — 76856 US EXAM PELVIC COMPLETE: CPT

## 2024-11-29 ENCOUNTER — RESULTS FOLLOW-UP (OUTPATIENT)
Dept: OBGYN CLINIC | Facility: CLINIC | Age: 44
End: 2024-11-29

## 2024-12-03 ENCOUNTER — TELEPHONE (OUTPATIENT)
Dept: OBGYN CLINIC | Facility: CLINIC | Age: 44
End: 2024-12-03

## 2024-12-03 DIAGNOSIS — R93.89 THICKENED ENDOMETRIUM: Primary | ICD-10-CM

## 2024-12-03 NOTE — TELEPHONE ENCOUNTER
Called patient to discuss ultrasound results. Recommendation for EMB. Pt counseled on procedure and advised to take motrin or tylenol prior to biopsy. Will also repeat ultrasound in 2-3 months. All questions answered.

## 2024-12-17 ENCOUNTER — PROCEDURE VISIT (OUTPATIENT)
Dept: OBGYN CLINIC | Facility: CLINIC | Age: 44
End: 2024-12-17
Payer: COMMERCIAL

## 2024-12-17 VITALS
SYSTOLIC BLOOD PRESSURE: 100 MMHG | WEIGHT: 160 LBS | HEIGHT: 69 IN | DIASTOLIC BLOOD PRESSURE: 60 MMHG | BODY MASS INDEX: 23.7 KG/M2

## 2024-12-17 DIAGNOSIS — N93.9 ABNORMAL UTERINE BLEEDING (AUB): Primary | ICD-10-CM

## 2024-12-17 LAB — SL AMB POCT URINE HCG: NEGATIVE

## 2024-12-17 PROCEDURE — 88305 TISSUE EXAM BY PATHOLOGIST: CPT | Performed by: STUDENT IN AN ORGANIZED HEALTH CARE EDUCATION/TRAINING PROGRAM

## 2024-12-17 PROCEDURE — 81025 URINE PREGNANCY TEST: CPT

## 2024-12-17 PROCEDURE — 58100 BIOPSY OF UTERUS LINING: CPT

## 2024-12-17 NOTE — PROGRESS NOTES
"Endometrial biopsy    Date/Time: 12/17/2024 4:00 PM    Performed by: ROSARIO Choudhury  Authorized by: Flori Young MD  Universal Protocol:  procedure performed by consultantConsent: Verbal consent obtained.  Risks and benefits: risks, benefits and alternatives were discussed  Consent given by: patient  Time out: Immediately prior to procedure a \"time out\" was called to verify the correct patient, procedure, equipment, support staff and site/side marked as required.  Timeout called at: 12/17/2024 4:15 PM.  Patient understanding: patient states understanding of the procedure being performed  Patient consent: the patient's understanding of the procedure matches consent given  Procedure consent: procedure consent matches procedure scheduled  Relevant documents: relevant documents present and verified  Test results: test results available and properly labeled  Radiology Images displayed and confirmed. If images not available, report reviewed: imaging studies available  Required items: required blood products, implants, devices, and special equipment available    Indication:     Indications: Other disorder of menstruation and other abnormal bleeding from female genital tract    Procedure:     Procedure: endometrial biopsy with [place]      A bivalve speculum was placed in the vagina: yes      Cervix cleaned and prepped: yes      The cervix was dilated: no      Uterus sounded: yes      Uterus sound depth (cm):  8    Specimen collected: specimen collected and sent to pathology      Patient tolerated procedure well with no complications: yes    Findings:     Uterus size:  Non-gravid    Cervix: normal      Adnexa: normal    Comments:     Procedure comments:  Pt counseled on need for endometrial biopsy. Aware of risk of risk of infection, uterine perforation and scarring, inability to obtain a sufficient sample and possible need for additional procedures. Pt aware I will call her and review results once available and " determine follow up plan as needed. Pt agrees to procedure. All questions answered.   Will plan to f/u based on results.   Reviewed w pt possible etiologies including hormonal surge and normal endometrial proliferation, hyperplasia and carcinoma.

## 2024-12-23 PROCEDURE — 88305 TISSUE EXAM BY PATHOLOGIST: CPT | Performed by: STUDENT IN AN ORGANIZED HEALTH CARE EDUCATION/TRAINING PROGRAM

## 2024-12-24 ENCOUNTER — RESULTS FOLLOW-UP (OUTPATIENT)
Dept: OBGYN CLINIC | Facility: CLINIC | Age: 44
End: 2024-12-24

## 2025-03-10 ENCOUNTER — TELEPHONE (OUTPATIENT)
Age: 45
End: 2025-03-10

## 2025-03-10 NOTE — TELEPHONE ENCOUNTER
Pt called and stated she notice two painful lumps near her labia. Pt would like to be seen this week. Nothing available until April. No answer from office. Pt stated she can got to either [place] or [facility].

## (undated) DEVICE — DRAPE SHEET THREE QUARTER

## (undated) DEVICE — GLOVE INDICATOR PI UNDERGLOVE SZ 7.5 BLUE

## (undated) DEVICE — DRAIN CHANNEL RND FULL FLUTED 10FR W/TROCAR

## (undated) DEVICE — STERILE POLYISOPRENE POWDER-FREE SURGICAL GLOVES: Brand: PROTEXIS

## (undated) DEVICE — ADHESIVE SKIN HIGH VISCOSITY EXOFIN 1ML

## (undated) DEVICE — SUT VICRYL 0 UR-6 27 IN J603H

## (undated) DEVICE — LIGAMAX 5 MM ENDOSCOPIC MULTIPLE CLIP APPLIER: Brand: LIGAMAX

## (undated) DEVICE — SUT MONOCRYL 4-0 PS-2 18 IN Y496G

## (undated) DEVICE — INTENDED FOR TISSUE SEPARATION, AND OTHER PROCEDURES THAT REQUIRE A SHARP SURGICAL BLADE TO PUNCTURE OR CUT.: Brand: BARD-PARKER ® SAFETYLOCK CARBON RIB-BACK BLADES

## (undated) DEVICE — SCD SEQUENTIAL COMPRESSION COMFORT SLEEVE MEDIUM KNEE LENGTH: Brand: KENDALL SCD

## (undated) DEVICE — PVC URETHRAL CATHETER: Brand: DOVER

## (undated) DEVICE — NEEDLE BLUNT 18 G X 1 1/2IN

## (undated) DEVICE — NEEDLE 25G X 1 1/2

## (undated) DEVICE — LAPAROSCOPIC TROCAR SLEEVE/SINGLE USE: Brand: KII® OPTICAL ACCESS SYSTEM

## (undated) DEVICE — TUBING SMOKE EVAC W/FILTRATION DEVICE PLUMEPORT ACTIV

## (undated) DEVICE — MEDI-VAC YANK SUCT HNDL W/TPRD BULBOUS TIP: Brand: CARDINAL HEALTH

## (undated) DEVICE — DISPOSABLE OR TOWEL: Brand: CARDINAL HEALTH

## (undated) DEVICE — TROCAR: Brand: KII® SLEEVE

## (undated) DEVICE — 40595 XL TRENDELENBURG POSITIONING KIT: Brand: 40595 XL TRENDELENBURG POSITIONING KIT

## (undated) DEVICE — SUT VICRYL 4-0 PS-2 18 IN J496G

## (undated) DEVICE — PACK UNIVERSAL DRAPES SUB-Q ICD

## (undated) DEVICE — SUT PROLENE 3-0 PC-5 18 IN 8632G

## (undated) DEVICE — TROCAR: Brand: KII FIOS FIRST ENTRY

## (undated) DEVICE — INTENDED FOR TISSUE SEPARATION, AND OTHER PROCEDURES THAT REQUIRE A SHARP SURGICAL BLADE TO PUNCTURE OR CUT.: Brand: BARD-PARKER SAFETY BLADES SIZE 11, STERILE

## (undated) DEVICE — SINGLE PORT MANIFOLD: Brand: NEPTUNE 2

## (undated) DEVICE — SYRINGE 30ML LL

## (undated) DEVICE — PACK PBDS LAP CHOLE RF

## (undated) DEVICE — JACKSON-PRATT 100CC BULB RESERVOIR: Brand: CARDINAL HEALTH

## (undated) DEVICE — ENDOPATH PNEUMONEEDLE INSUFFLATION NEEDLES WITH LUER LOCK CONNECTORS 120MM: Brand: ENDOPATH

## (undated) DEVICE — ELECTRODE LAP J HOOK E-Z CLEAN 33CM-0021

## (undated) DEVICE — PENCIL ELECTROSURG E-Z CLEAN -0035H

## (undated) DEVICE — Device: Brand: OMNICLOSE TROCAR SITE CLOSURE DEVICE

## (undated) DEVICE — SKIN MARKER DUAL TIP WITH RULER CAP, FLEXIBLE RULER AND LABELS: Brand: DEVON

## (undated) DEVICE — SPONGE LAP 18 X 18 IN STRL RFD

## (undated) DEVICE — CHLORAPREP HI-LITE 26ML ORANGE

## (undated) DEVICE — TUBING SUCTION 5MM X 12 FT

## (undated) DEVICE — TISSUE RETRIEVAL SYSTEM: Brand: INZII RETRIEVAL SYSTEM

## (undated) DEVICE — SUT VICRYL 2-0 J459H

## (undated) DEVICE — NEPTUNE E-SEP SMOKE EVACUATION PENCIL, COATED, 70MM BLADE, PUSH BUTTON SWITCH: Brand: NEPTUNE E-SEP

## (undated) DEVICE — BASIC SINGLE BASIN-LF: Brand: MEDLINE INDUSTRIES, INC.

## (undated) DEVICE — WET SKIN PREP TRAY: Brand: MEDLINE INDUSTRIES, INC.

## (undated) DEVICE — GLOVE PI ULTRA TOUCH SZ.7.0

## (undated) DEVICE — 1820 FOAM BLOCK NEEDLE COUNTER: Brand: DEVON

## (undated) DEVICE — GLOVE INDICATOR UNDERGLOVE SZ 6 BLUE

## (undated) DEVICE — STANDARD SURGICAL GOWN, L: Brand: CONVERTORS

## (undated) DEVICE — BETHLEHEM UNIVERSAL GYN LAP PK: Brand: CARDINAL HEALTH

## (undated) DEVICE — SYRINGE 10ML LL CONTROL TOP

## (undated) DEVICE — INSUFLATION TUBING INSUFLOW (LEXION)

## (undated) DEVICE — ENSEAL X1 TISSUE SEALER, CURVED JAW, 37 CM SHAFT LENGTH: Brand: ENSEAL

## (undated) DEVICE — OCCLUSIVE GAUZE STRIP,3% BISMUTH TRIBROMOPHENATE IN PETROLATUM BLEND: Brand: XEROFORM

## (undated) DEVICE — ENDOPATH BIPOLAR FORCEPS WITH MACRO JAW: Brand: ENDOPATH

## (undated) DEVICE — KERLIX BANDAGE ROLL: Brand: KERLIX

## (undated) DEVICE — ELECTRODE LAP BLADE CRV E-Z CLEAN 33CM -0019